# Patient Record
Sex: FEMALE | Race: WHITE | NOT HISPANIC OR LATINO | URBAN - METROPOLITAN AREA
[De-identification: names, ages, dates, MRNs, and addresses within clinical notes are randomized per-mention and may not be internally consistent; named-entity substitution may affect disease eponyms.]

---

## 2017-06-20 ENCOUNTER — OUTPATIENT (OUTPATIENT)
Dept: OUTPATIENT SERVICES | Facility: HOSPITAL | Age: 72
LOS: 1 days | Discharge: HOME | End: 2017-06-20

## 2017-06-20 DIAGNOSIS — K58.9 IRRITABLE BOWEL SYNDROME WITHOUT DIARRHEA: ICD-10-CM

## 2017-06-20 DIAGNOSIS — J44.9 CHRONIC OBSTRUCTIVE PULMONARY DISEASE, UNSPECIFIED: ICD-10-CM

## 2017-06-28 DIAGNOSIS — Z12.31 ENCOUNTER FOR SCREENING MAMMOGRAM FOR MALIGNANT NEOPLASM OF BREAST: ICD-10-CM

## 2017-06-29 ENCOUNTER — OUTPATIENT (OUTPATIENT)
Dept: OUTPATIENT SERVICES | Facility: HOSPITAL | Age: 72
LOS: 1 days | Discharge: HOME | End: 2017-06-29

## 2017-06-29 DIAGNOSIS — K58.9 IRRITABLE BOWEL SYNDROME WITHOUT DIARRHEA: ICD-10-CM

## 2017-06-29 DIAGNOSIS — J44.9 CHRONIC OBSTRUCTIVE PULMONARY DISEASE, UNSPECIFIED: ICD-10-CM

## 2017-06-29 DIAGNOSIS — R92.8 OTHER ABNORMAL AND INCONCLUSIVE FINDINGS ON DIAGNOSTIC IMAGING OF BREAST: ICD-10-CM

## 2017-08-04 ENCOUNTER — INPATIENT (INPATIENT)
Facility: HOSPITAL | Age: 72
LOS: 6 days | Discharge: HOME | End: 2017-08-11
Attending: SPECIALIST

## 2017-08-04 DIAGNOSIS — J44.9 CHRONIC OBSTRUCTIVE PULMONARY DISEASE, UNSPECIFIED: ICD-10-CM

## 2017-08-04 DIAGNOSIS — K58.9 IRRITABLE BOWEL SYNDROME WITHOUT DIARRHEA: ICD-10-CM

## 2017-08-15 DIAGNOSIS — M81.0 AGE-RELATED OSTEOPOROSIS WITHOUT CURRENT PATHOLOGICAL FRACTURE: ICD-10-CM

## 2017-08-15 DIAGNOSIS — R53.1 WEAKNESS: ICD-10-CM

## 2017-08-15 DIAGNOSIS — K58.0 IRRITABLE BOWEL SYNDROME WITH DIARRHEA: ICD-10-CM

## 2017-08-15 DIAGNOSIS — D64.9 ANEMIA, UNSPECIFIED: ICD-10-CM

## 2017-08-15 DIAGNOSIS — N13.39 OTHER HYDRONEPHROSIS: ICD-10-CM

## 2017-08-15 DIAGNOSIS — E86.0 DEHYDRATION: ICD-10-CM

## 2017-08-15 DIAGNOSIS — J18.9 PNEUMONIA, UNSPECIFIED ORGANISM: ICD-10-CM

## 2017-08-15 DIAGNOSIS — J44.1 CHRONIC OBSTRUCTIVE PULMONARY DISEASE WITH (ACUTE) EXACERBATION: ICD-10-CM

## 2017-08-15 DIAGNOSIS — H40.9 UNSPECIFIED GLAUCOMA: ICD-10-CM

## 2017-08-15 DIAGNOSIS — E87.1 HYPO-OSMOLALITY AND HYPONATREMIA: ICD-10-CM

## 2017-08-15 DIAGNOSIS — J44.0 CHRONIC OBSTRUCTIVE PULMONARY DISEASE WITH ACUTE LOWER RESPIRATORY INFECTION: ICD-10-CM

## 2017-08-15 DIAGNOSIS — J90 PLEURAL EFFUSION, NOT ELSEWHERE CLASSIFIED: ICD-10-CM

## 2017-08-15 DIAGNOSIS — A41.59 OTHER GRAM-NEGATIVE SEPSIS: ICD-10-CM

## 2017-08-15 DIAGNOSIS — Z88.8 ALLERGY STATUS TO OTHER DRUGS, MEDICAMENTS AND BIOLOGICAL SUBSTANCES STATUS: ICD-10-CM

## 2017-08-15 DIAGNOSIS — Z87.01 PERSONAL HISTORY OF PNEUMONIA (RECURRENT): ICD-10-CM

## 2017-08-28 ENCOUNTER — OUTPATIENT (OUTPATIENT)
Dept: OUTPATIENT SERVICES | Facility: HOSPITAL | Age: 72
LOS: 1 days | Discharge: HOME | End: 2017-08-28

## 2017-08-28 DIAGNOSIS — K58.9 IRRITABLE BOWEL SYNDROME WITHOUT DIARRHEA: ICD-10-CM

## 2017-08-28 DIAGNOSIS — J44.9 CHRONIC OBSTRUCTIVE PULMONARY DISEASE, UNSPECIFIED: ICD-10-CM

## 2017-08-28 DIAGNOSIS — J18.9 PNEUMONIA, UNSPECIFIED ORGANISM: ICD-10-CM

## 2017-08-28 PROBLEM — Z00.00 ENCOUNTER FOR PREVENTIVE HEALTH EXAMINATION: Status: ACTIVE | Noted: 2017-08-28

## 2017-12-29 ENCOUNTER — OUTPATIENT (OUTPATIENT)
Dept: OUTPATIENT SERVICES | Facility: HOSPITAL | Age: 72
LOS: 1 days | Discharge: HOME | End: 2017-12-29

## 2017-12-29 DIAGNOSIS — K58.9 IRRITABLE BOWEL SYNDROME WITHOUT DIARRHEA: ICD-10-CM

## 2017-12-29 DIAGNOSIS — R92.8 OTHER ABNORMAL AND INCONCLUSIVE FINDINGS ON DIAGNOSTIC IMAGING OF BREAST: ICD-10-CM

## 2017-12-29 DIAGNOSIS — J44.9 CHRONIC OBSTRUCTIVE PULMONARY DISEASE, UNSPECIFIED: ICD-10-CM

## 2018-05-21 ENCOUNTER — OUTPATIENT (OUTPATIENT)
Dept: OUTPATIENT SERVICES | Facility: HOSPITAL | Age: 73
LOS: 1 days | Discharge: HOME | End: 2018-05-21

## 2018-05-21 DIAGNOSIS — H53.2 DIPLOPIA: ICD-10-CM

## 2018-07-27 ENCOUNTER — OUTPATIENT (OUTPATIENT)
Dept: OUTPATIENT SERVICES | Facility: HOSPITAL | Age: 73
LOS: 1 days | Discharge: HOME | End: 2018-07-27

## 2018-07-27 DIAGNOSIS — R06.02 SHORTNESS OF BREATH: ICD-10-CM

## 2018-07-31 ENCOUNTER — APPOINTMENT (OUTPATIENT)
Dept: OBGYN | Facility: CLINIC | Age: 73
End: 2018-07-31

## 2018-10-18 ENCOUNTER — FORM ENCOUNTER (OUTPATIENT)
Age: 73
End: 2018-10-18

## 2018-10-19 ENCOUNTER — OUTPATIENT (OUTPATIENT)
Dept: OUTPATIENT SERVICES | Facility: HOSPITAL | Age: 73
LOS: 1 days | Discharge: HOME | End: 2018-10-19

## 2018-10-19 DIAGNOSIS — Z12.31 ENCOUNTER FOR SCREENING MAMMOGRAM FOR MALIGNANT NEOPLASM OF BREAST: ICD-10-CM

## 2018-11-01 ENCOUNTER — APPOINTMENT (OUTPATIENT)
Dept: OBGYN | Facility: CLINIC | Age: 73
End: 2018-11-01

## 2018-11-15 ENCOUNTER — APPOINTMENT (OUTPATIENT)
Dept: OBGYN | Facility: CLINIC | Age: 73
End: 2018-11-15

## 2019-07-08 ENCOUNTER — APPOINTMENT (OUTPATIENT)
Dept: OBGYN | Facility: CLINIC | Age: 74
End: 2019-07-08
Payer: MEDICARE

## 2019-07-08 ENCOUNTER — LABORATORY RESULT (OUTPATIENT)
Age: 74
End: 2019-07-08

## 2019-07-08 ENCOUNTER — OUTPATIENT (OUTPATIENT)
Dept: OUTPATIENT SERVICES | Facility: HOSPITAL | Age: 74
LOS: 1 days | Discharge: HOME | End: 2019-07-08

## 2019-07-08 VITALS — BODY MASS INDEX: 18.4 KG/M2 | HEIGHT: 62 IN | WEIGHT: 100 LBS

## 2019-07-08 DIAGNOSIS — Z01.419 ENCOUNTER FOR GYNECOLOGICAL EXAMINATION (GENERAL) (ROUTINE) W/OUT ABNORMAL FINDINGS: ICD-10-CM

## 2019-07-08 DIAGNOSIS — Z78.0 ASYMPTOMATIC MENOPAUSAL STATE: ICD-10-CM

## 2019-07-08 DIAGNOSIS — M81.0 AGE-RELATED OSTEOPOROSIS W/OUT CURRENT PATHOLOGICAL FRACTURE: ICD-10-CM

## 2019-07-08 PROCEDURE — 81003 URINALYSIS AUTO W/O SCOPE: CPT | Mod: QW

## 2019-07-08 PROCEDURE — 99397 PER PM REEVAL EST PAT 65+ YR: CPT

## 2019-07-08 PROCEDURE — 77085 DXA BONE DENSITY AXL VRT FX: CPT

## 2019-07-09 DIAGNOSIS — Z01.419 ENCOUNTER FOR GYNECOLOGICAL EXAMINATION (GENERAL) (ROUTINE) WITHOUT ABNORMAL FINDINGS: ICD-10-CM

## 2019-07-14 PROBLEM — Z01.419 WELL WOMAN EXAM WITH ROUTINE GYNECOLOGICAL EXAM: Status: ACTIVE | Noted: 2019-07-14

## 2019-07-14 PROBLEM — M81.0 OSTEOPOROSIS, POSTMENOPAUSAL: Status: ACTIVE | Noted: 2019-07-14

## 2019-07-14 PROBLEM — Z78.0 MENOPAUSE: Status: ACTIVE | Noted: 2019-07-14

## 2019-07-23 LAB
BILIRUB UR QL STRIP: NORMAL
CLARITY UR: CLEAR
GLUCOSE UR-MCNC: NORMAL
HCG UR QL: NORMAL EU/DL
HGB UR QL STRIP.AUTO: NORMAL
KETONES UR-MCNC: NORMAL
LEUKOCYTE ESTERASE UR QL STRIP: 500
NITRITE UR QL STRIP: NORMAL
PH UR STRIP: 5
PROT UR STRIP-MCNC: NORMAL
SP GR UR STRIP: 1.01

## 2019-10-17 ENCOUNTER — FORM ENCOUNTER (OUTPATIENT)
Age: 74
End: 2019-10-17

## 2019-10-18 ENCOUNTER — OUTPATIENT (OUTPATIENT)
Dept: OUTPATIENT SERVICES | Facility: HOSPITAL | Age: 74
LOS: 1 days | Discharge: HOME | End: 2019-10-18
Payer: MEDICARE

## 2019-10-18 DIAGNOSIS — Z12.31 ENCOUNTER FOR SCREENING MAMMOGRAM FOR MALIGNANT NEOPLASM OF BREAST: ICD-10-CM

## 2019-10-18 PROCEDURE — 77067 SCR MAMMO BI INCL CAD: CPT | Mod: 26

## 2019-10-18 PROCEDURE — 77063 BREAST TOMOSYNTHESIS BI: CPT | Mod: 26

## 2020-07-09 ENCOUNTER — APPOINTMENT (OUTPATIENT)
Dept: OBGYN | Facility: CLINIC | Age: 75
End: 2020-07-09

## 2020-10-22 ENCOUNTER — RESULT REVIEW (OUTPATIENT)
Age: 75
End: 2020-10-22

## 2020-10-22 ENCOUNTER — OUTPATIENT (OUTPATIENT)
Dept: OUTPATIENT SERVICES | Facility: HOSPITAL | Age: 75
LOS: 1 days | Discharge: HOME | End: 2020-10-22
Payer: MEDICARE

## 2020-10-22 DIAGNOSIS — Z12.31 ENCOUNTER FOR SCREENING MAMMOGRAM FOR MALIGNANT NEOPLASM OF BREAST: ICD-10-CM

## 2020-10-22 PROCEDURE — 77067 SCR MAMMO BI INCL CAD: CPT | Mod: 26

## 2021-01-07 ENCOUNTER — APPOINTMENT (OUTPATIENT)
Dept: OBGYN | Facility: CLINIC | Age: 76
End: 2021-01-07

## 2021-04-04 ENCOUNTER — NON-APPOINTMENT (OUTPATIENT)
Age: 76
End: 2021-04-04

## 2021-04-04 DIAGNOSIS — Z87.09 PERSONAL HISTORY OF OTHER DISEASES OF THE RESPIRATORY SYSTEM: ICD-10-CM

## 2021-04-04 DIAGNOSIS — Z87.891 PERSONAL HISTORY OF NICOTINE DEPENDENCE: ICD-10-CM

## 2021-04-04 DIAGNOSIS — Z86.19 PERSONAL HISTORY OF OTHER INFECTIOUS AND PARASITIC DISEASES: ICD-10-CM

## 2021-04-22 ENCOUNTER — APPOINTMENT (OUTPATIENT)
Dept: PULMONOLOGY | Facility: CLINIC | Age: 76
End: 2021-04-22
Payer: MEDICARE

## 2021-04-22 VITALS
HEIGHT: 62 IN | DIASTOLIC BLOOD PRESSURE: 78 MMHG | BODY MASS INDEX: 18.81 KG/M2 | RESPIRATION RATE: 14 BRPM | OXYGEN SATURATION: 94 % | SYSTOLIC BLOOD PRESSURE: 150 MMHG | WEIGHT: 102.2 LBS | HEART RATE: 62 BPM

## 2021-04-22 PROCEDURE — 99072 ADDL SUPL MATRL&STAF TM PHE: CPT

## 2021-04-22 PROCEDURE — 99213 OFFICE O/P EST LOW 20 MIN: CPT

## 2021-04-22 NOTE — PHYSICAL EXAM
[No Acute Distress] : no acute distress [Normal Oropharynx] : normal oropharynx [Normal Appearance] : normal appearance [No Neck Mass] : no neck mass [Normal Rate/Rhythm] : normal rate/rhythm [Normal S1, S2] : normal s1, s2 [No Murmurs] : no murmurs [No Resp Distress] : no resp distress [Rales] : rales [No Abnormalities] : no abnormalities [No Clubbing] : no clubbing [No Cyanosis] : no cyanosis [No Edema] : no edema

## 2021-07-28 ENCOUNTER — APPOINTMENT (OUTPATIENT)
Age: 76
End: 2021-07-28
Payer: MEDICARE

## 2021-07-28 VITALS
SYSTOLIC BLOOD PRESSURE: 118 MMHG | HEART RATE: 78 BPM | BODY MASS INDEX: 18.77 KG/M2 | HEIGHT: 62 IN | RESPIRATION RATE: 12 BRPM | DIASTOLIC BLOOD PRESSURE: 70 MMHG | WEIGHT: 102 LBS | OXYGEN SATURATION: 92 %

## 2021-07-28 PROCEDURE — 99072 ADDL SUPL MATRL&STAF TM PHE: CPT

## 2021-07-28 PROCEDURE — 99213 OFFICE O/P EST LOW 20 MIN: CPT

## 2021-08-26 ENCOUNTER — APPOINTMENT (OUTPATIENT)
Age: 76
End: 2021-08-26
Payer: MEDICARE

## 2021-08-26 VITALS
RESPIRATION RATE: 12 BRPM | OXYGEN SATURATION: 92 % | HEART RATE: 58 BPM | DIASTOLIC BLOOD PRESSURE: 80 MMHG | SYSTOLIC BLOOD PRESSURE: 120 MMHG

## 2021-08-26 PROCEDURE — 99214 OFFICE O/P EST MOD 30 MIN: CPT
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
## 2021-08-26 NOTE — PHYSICAL EXAM
[No Acute Distress] : no acute distress [Normal Oropharynx] : normal oropharynx [Normal Appearance] : normal appearance [No Neck Mass] : no neck mass [Normal Rate/Rhythm] : normal rate/rhythm [Normal S1, S2] : normal s1, s2 [No Murmurs] : no murmurs [No Resp Distress] : no resp distress [No Abnormalities] : no abnormalities [Benign] : benign [Normal Gait] : normal gait [No Clubbing] : no clubbing [No Cyanosis] : no cyanosis [No Edema] : no edema [FROM] : FROM [Normal Color/ Pigmentation] : normal color/ pigmentation [No Focal Deficits] : no focal deficits [Oriented x3] : oriented x3 [Normal Affect] : normal affect [TextBox_68] : wheeze and crackles

## 2021-08-26 NOTE — HISTORY OF PRESENT ILLNESS
[Severe Persistent] : severe persistent [Doing Poorly] : doing poorly [Not Well Controlled] : Not well controlled [Wheezing] : worsened wheezing [Shortness Of Breath] : worsened shortness of breath [Cough] : worsened coughing [Chest Tightness Or Heavy Pressure] : worsened chest tightness [de-identified] : just finished a pred taper helped but now SOB again [de-identified] : flare of the bronchiectasis [TextBox_4] : I reviewed  original evaluation  and last notes.Was followed by [facility]AS in our office.\par I reviewed and interpreted the /CT on the:\[facility] site via computer access.\par I discussed the results with the patient.\par

## 2021-10-21 ENCOUNTER — APPOINTMENT (OUTPATIENT)
Age: 76
End: 2021-10-21
Payer: MEDICARE

## 2021-10-21 VITALS
RESPIRATION RATE: 14 BRPM | DIASTOLIC BLOOD PRESSURE: 70 MMHG | HEIGHT: 62 IN | HEART RATE: 80 BPM | SYSTOLIC BLOOD PRESSURE: 128 MMHG | WEIGHT: 101 LBS | BODY MASS INDEX: 18.58 KG/M2

## 2021-10-21 PROCEDURE — 99214 OFFICE O/P EST MOD 30 MIN: CPT

## 2021-10-21 RX ORDER — AZITHROMYCIN 250 MG/1
250 TABLET, FILM COATED ORAL
Refills: 0 | Status: COMPLETED | COMMUNITY
End: 2021-10-21

## 2021-10-21 RX ORDER — AZITHROMYCIN 250 MG/1
250 TABLET, FILM COATED ORAL
Qty: 1 | Refills: 0 | Status: COMPLETED | COMMUNITY
Start: 2021-07-28 | End: 2021-10-21

## 2021-10-21 NOTE — PHYSICAL EXAM
[No Acute Distress] : no acute distress [Normal Oropharynx] : normal oropharynx [Normal Appearance] : normal appearance [No Neck Mass] : no neck mass [Normal Rate/Rhythm] : normal rate/rhythm [Normal S1, S2] : normal s1, s2 [No Murmurs] : no murmurs [No Resp Distress] : no resp distress [No Abnormalities] : no abnormalities [Benign] : benign [Normal Gait] : normal gait [No Clubbing] : no clubbing [No Cyanosis] : no cyanosis [No Edema] : no edema [FROM] : FROM [Normal Color/ Pigmentation] : normal color/ pigmentation [No Focal Deficits] : no focal deficits [Oriented x3] : oriented x3 [Normal Affect] : normal affect [TextBox_68] : wheeze and crackles decreased breath sounds

## 2021-10-21 NOTE — ASSESSMENT
[FreeTextEntry1] : I believe the patient is having an exacerbation of her bronchiectasis\par She cannot get her secretions out.\par \par A:\par COPD  \par Bronchiectasis with exacerbation\par \par plan:\par Prednisone taper\par Stress compliance with inhalers.  All meds renewed today.\par cont PRN albuterol neb\par cont ICS/LABA/LAMA\par Z-Alexei\par trial of a AIr Physio device\par \par F/U spring or sooner\par

## 2021-10-21 NOTE — REASON FOR VISIT
[Acute] : an acute visit [Bronchiectasis] : bronchiectasis [Shortness of Breath] : shortness of breath [TextBox_44] : Patient previously followed by [facility].  Has a history of bronchial ectasis and COPD.  She is very short of breath today.  She states that she can cough the mucus up.  This has been worsening over several days.  On a good day she feels somewhat better.  He does not feel that this is a dramatic difference but enough to make her feel poorly.  She has not been having any fevers.  She cannot get any secretions out.

## 2021-11-04 ENCOUNTER — OUTPATIENT (OUTPATIENT)
Dept: OUTPATIENT SERVICES | Facility: HOSPITAL | Age: 76
LOS: 1 days | Discharge: HOME | End: 2021-11-04
Payer: MEDICARE

## 2021-11-04 ENCOUNTER — RESULT REVIEW (OUTPATIENT)
Age: 76
End: 2021-11-04

## 2021-11-04 DIAGNOSIS — Z12.31 ENCOUNTER FOR SCREENING MAMMOGRAM FOR MALIGNANT NEOPLASM OF BREAST: ICD-10-CM

## 2021-11-04 PROCEDURE — 77067 SCR MAMMO BI INCL CAD: CPT | Mod: 26

## 2021-11-04 PROCEDURE — 77063 BREAST TOMOSYNTHESIS BI: CPT | Mod: 26

## 2021-12-28 RX ORDER — FLUTICASONE PROPIONATE AND SALMETEROL 500; 50 UG/1; UG/1
500-50 POWDER RESPIRATORY (INHALATION) TWICE DAILY
Qty: 3 | Refills: 5 | Status: COMPLETED | COMMUNITY
End: 2021-12-28

## 2021-12-28 RX ORDER — FLUTICASONE PROPIONATE AND SALMETEROL 250; 50 UG/1; UG/1
250-50 POWDER RESPIRATORY (INHALATION)
Qty: 1 | Refills: 5 | Status: COMPLETED | COMMUNITY
End: 2021-12-28

## 2021-12-28 RX ORDER — UMECLIDINIUM 62.5 UG/1
62.5 AEROSOL, POWDER ORAL DAILY
Qty: 3 | Refills: 3 | Status: COMPLETED | COMMUNITY
End: 2021-12-28

## 2021-12-28 RX ORDER — FLUTICASONE PROPIONATE AND SALMETEROL 500; 50 UG/1; UG/1
500-50 POWDER RESPIRATORY (INHALATION) TWICE DAILY
Qty: 3 | Refills: 3 | Status: COMPLETED | COMMUNITY
End: 2021-12-28

## 2022-03-24 ENCOUNTER — APPOINTMENT (OUTPATIENT)
Age: 77
End: 2022-03-24
Payer: MEDICARE

## 2022-03-24 VITALS — OXYGEN SATURATION: 86 %

## 2022-03-24 PROCEDURE — 99214 OFFICE O/P EST MOD 30 MIN: CPT | Mod: 25

## 2022-03-24 PROCEDURE — 71046 X-RAY EXAM CHEST 2 VIEWS: CPT

## 2022-03-24 RX ORDER — PREDNISONE 10 MG/1
10 TABLET ORAL
Qty: 20 | Refills: 3 | Status: COMPLETED | COMMUNITY
Start: 2021-05-20 | End: 2022-03-24

## 2022-03-24 RX ORDER — PREDNISONE 10 MG/1
10 TABLET ORAL
Qty: 20 | Refills: 3 | Status: COMPLETED | COMMUNITY
Start: 2021-07-28 | End: 2022-03-24

## 2022-03-24 NOTE — REASON FOR VISIT
[Follow-Up] : a follow-up visit [COPD] : COPD [Bronchiectasis] : bronchiectasis [Shortness of Breath] : shortness of breath [TextBox_44] : Patient states that she has been having more shortness of breath past several days.\par No fevers there is no sputum production she states that she walks a short amount patient cannot catch her breath.  This is been slowly worsening.\par \par Patient has been taking her medications she does have a nebulizer which she states is not always helpful.

## 2022-03-24 NOTE — ASSESSMENT
[FreeTextEntry1] : Assessment:\par COPD   with exacerbation \par Bronchiectasis\par \par plan:\par nebulizer given in office today.\par She is refusing to go to the emergency room.  She states that she will feel better when she relaxes.\par She needs to have a CAT scan of the chest I am concerned about possible SAJI on top of her chronic lung disease..  She does not think this is necessary at this point.\par She knows if she worsens to any significant extent she needs to go to the emergency room\par Stress compliance with inhalers. Renewed today.\par cont PRN albuterol\par cont ICS/LABA\par I will order home O2 therapy for the patient.\par \par \par F/U telemetry visit in 2 months\par

## 2022-03-24 NOTE — REVIEW OF SYSTEMS
[Cough] : cough [SOB on Exertion] : sob on exertion [Negative] : Endocrine [Chest Tightness] : no chest tightness [Sputum] : no sputum [Pleuritic Pain] : no pleuritic pain

## 2022-03-24 NOTE — PROCEDURE
[FreeTextEntry1] : PA and LAT CXR Report\par \par PA and LAT CXR was ordered to evaluate for causes of dyspnea.\par  \par The films demonstrates:\par The heart and mediastinal structures are normal\par There are increased interstitial markings. faint areas ground glass\par There are no new infiltrates present\par There are no new nodules or  masses present \par \par \par Impression:\par Consistent with hyperinflation COPD.\par Mild groundglass infiltrates chronic.  Present on old films may represent SAJI etc\par

## 2022-03-24 NOTE — PHYSICAL EXAM
[No Acute Distress] : no acute distress [Normal Oropharynx] : normal oropharynx [Normal Appearance] : normal appearance [No Neck Mass] : no neck mass [Normal Rate/Rhythm] : normal rate/rhythm [Normal S1, S2] : normal s1, s2 [No Murmurs] : no murmurs [Wheeze] : wheeze [No Abnormalities] : no abnormalities [Benign] : benign [Normal Gait] : normal gait [No Clubbing] : no clubbing [No Cyanosis] : no cyanosis [No Edema] : no edema [FROM] : FROM [Normal Color/ Pigmentation] : normal color/ pigmentation [No Focal Deficits] : no focal deficits [Oriented x3] : oriented x3 [Normal Affect] : normal affect [TextBox_68] : Patient had right mild respiratory distress on presentation but resolved after sitting for a few moments.  Decreased breath sounds

## 2022-05-25 ENCOUNTER — APPOINTMENT (OUTPATIENT)
Age: 77
End: 2022-05-25
Payer: MEDICARE

## 2022-05-25 PROCEDURE — 99442: CPT

## 2022-05-25 NOTE — REVIEW OF SYSTEMS
[Cough] : cough [Chest Tightness] : no chest tightness [Sputum] : no sputum [Pleuritic Pain] : no pleuritic pain [SOB on Exertion] : sob on exertion [Negative] : Endocrine

## 2022-05-25 NOTE — REASON FOR VISIT
[Home] : at home, [unfilled] , at the time of the visit. [Medical Office: ([facility])___] : at the medical office located in  [Family Member] : family member [Verbal consent obtained from patient] : the patient, [unfilled] [Follow-Up] : a follow-up visit [COPD] : COPD [TextBox_44] : still feeling at baseline SOB

## 2022-05-25 NOTE — ASSESSMENT
[FreeTextEntry1] : Assessment:\par COPD   with exacerbation \par Bronchiectasis\par status quo\par plan:\par \par She is refuses  to go to the emergency room if she is sick.\par She needs to have a CAT scan of the chestbut she is refusing currentlydisease..  She does not think this is necessary at this point.\par She knows if she worsens to any significant extent she needs to go to the emergency room\par Stress compliance with inhalers. Renewed today.\par cont PRN albuterol\par cont ICS/LABA\par has  home O2 therapy for the patient.\par the patient is slowly declining.\par \par total phone call  time 14min\par \par F/U tele visit in 3-4 months\par \par \par \par \par

## 2022-06-07 ENCOUNTER — EMERGENCY (EMERGENCY)
Facility: HOSPITAL | Age: 77
LOS: 0 days | Discharge: HOME | End: 2022-06-07
Attending: EMERGENCY MEDICINE | Admitting: EMERGENCY MEDICINE
Payer: MEDICARE

## 2022-06-07 VITALS
SYSTOLIC BLOOD PRESSURE: 191 MMHG | OXYGEN SATURATION: 95 % | DIASTOLIC BLOOD PRESSURE: 74 MMHG | RESPIRATION RATE: 20 BRPM | HEART RATE: 68 BPM | TEMPERATURE: 98 F

## 2022-06-07 DIAGNOSIS — Z86.69 PERSONAL HISTORY OF OTHER DISEASES OF THE NERVOUS SYSTEM AND SENSE ORGANS: ICD-10-CM

## 2022-06-07 DIAGNOSIS — M54.9 DORSALGIA, UNSPECIFIED: ICD-10-CM

## 2022-06-07 DIAGNOSIS — I10 ESSENTIAL (PRIMARY) HYPERTENSION: ICD-10-CM

## 2022-06-07 DIAGNOSIS — R10.9 UNSPECIFIED ABDOMINAL PAIN: ICD-10-CM

## 2022-06-07 DIAGNOSIS — J44.9 CHRONIC OBSTRUCTIVE PULMONARY DISEASE, UNSPECIFIED: ICD-10-CM

## 2022-06-07 DIAGNOSIS — Z87.19 PERSONAL HISTORY OF OTHER DISEASES OF THE DIGESTIVE SYSTEM: ICD-10-CM

## 2022-06-07 DIAGNOSIS — Z88.0 ALLERGY STATUS TO PENICILLIN: ICD-10-CM

## 2022-06-07 DIAGNOSIS — Z87.2 PERSONAL HISTORY OF DISEASES OF THE SKIN AND SUBCUTANEOUS TISSUE: ICD-10-CM

## 2022-06-07 LAB
ALBUMIN SERPL ELPH-MCNC: 3.7 G/DL — SIGNIFICANT CHANGE UP (ref 3.5–5.2)
ALP SERPL-CCNC: 73 U/L — SIGNIFICANT CHANGE UP (ref 30–115)
ALT FLD-CCNC: 16 U/L — SIGNIFICANT CHANGE UP (ref 0–41)
ANION GAP SERPL CALC-SCNC: 13 MMOL/L — SIGNIFICANT CHANGE UP (ref 7–14)
APPEARANCE UR: CLEAR — SIGNIFICANT CHANGE UP
AST SERPL-CCNC: 27 U/L — SIGNIFICANT CHANGE UP (ref 0–41)
BACTERIA # UR AUTO: NEGATIVE — SIGNIFICANT CHANGE UP
BASOPHILS # BLD AUTO: 0.04 K/UL — SIGNIFICANT CHANGE UP (ref 0–0.2)
BASOPHILS NFR BLD AUTO: 0.6 % — SIGNIFICANT CHANGE UP (ref 0–1)
BILIRUB SERPL-MCNC: 0.4 MG/DL — SIGNIFICANT CHANGE UP (ref 0.2–1.2)
BILIRUB UR-MCNC: NEGATIVE — SIGNIFICANT CHANGE UP
BUN SERPL-MCNC: 24 MG/DL — HIGH (ref 10–20)
CALCIUM SERPL-MCNC: 9.3 MG/DL — SIGNIFICANT CHANGE UP (ref 8.5–10.1)
CHLORIDE SERPL-SCNC: 97 MMOL/L — LOW (ref 98–110)
CO2 SERPL-SCNC: 24 MMOL/L — SIGNIFICANT CHANGE UP (ref 17–32)
COLOR SPEC: COLORLESS — SIGNIFICANT CHANGE UP
CREAT SERPL-MCNC: 1.1 MG/DL — SIGNIFICANT CHANGE UP (ref 0.7–1.5)
DIFF PNL FLD: NEGATIVE — SIGNIFICANT CHANGE UP
EGFR: 52 ML/MIN/1.73M2 — LOW
EOSINOPHIL # BLD AUTO: 0.12 K/UL — SIGNIFICANT CHANGE UP (ref 0–0.7)
EOSINOPHIL NFR BLD AUTO: 1.8 % — SIGNIFICANT CHANGE UP (ref 0–8)
EPI CELLS # UR: 1 /HPF — SIGNIFICANT CHANGE UP (ref 0–5)
GLUCOSE SERPL-MCNC: 94 MG/DL — SIGNIFICANT CHANGE UP (ref 70–99)
GLUCOSE UR QL: NEGATIVE — SIGNIFICANT CHANGE UP
HCT VFR BLD CALC: 37.6 % — SIGNIFICANT CHANGE UP (ref 37–47)
HGB BLD-MCNC: 12.6 G/DL — SIGNIFICANT CHANGE UP (ref 12–16)
HYALINE CASTS # UR AUTO: 0 /LPF — SIGNIFICANT CHANGE UP (ref 0–7)
IMM GRANULOCYTES NFR BLD AUTO: 0.3 % — SIGNIFICANT CHANGE UP (ref 0.1–0.3)
KETONES UR-MCNC: SIGNIFICANT CHANGE UP
LACTATE SERPL-SCNC: 1.3 MMOL/L — SIGNIFICANT CHANGE UP (ref 0.7–2)
LEUKOCYTE ESTERASE UR-ACNC: ABNORMAL
LIDOCAIN IGE QN: 81 U/L — HIGH (ref 7–60)
LYMPHOCYTES # BLD AUTO: 1.76 K/UL — SIGNIFICANT CHANGE UP (ref 1.2–3.4)
LYMPHOCYTES # BLD AUTO: 25.9 % — SIGNIFICANT CHANGE UP (ref 20.5–51.1)
MCHC RBC-ENTMCNC: 31.3 PG — HIGH (ref 27–31)
MCHC RBC-ENTMCNC: 33.5 G/DL — SIGNIFICANT CHANGE UP (ref 32–37)
MCV RBC AUTO: 93.5 FL — SIGNIFICANT CHANGE UP (ref 81–99)
MONOCYTES # BLD AUTO: 0.53 K/UL — SIGNIFICANT CHANGE UP (ref 0.1–0.6)
MONOCYTES NFR BLD AUTO: 7.8 % — SIGNIFICANT CHANGE UP (ref 1.7–9.3)
NEUTROPHILS # BLD AUTO: 4.33 K/UL — SIGNIFICANT CHANGE UP (ref 1.4–6.5)
NEUTROPHILS NFR BLD AUTO: 63.6 % — SIGNIFICANT CHANGE UP (ref 42.2–75.2)
NITRITE UR-MCNC: NEGATIVE — SIGNIFICANT CHANGE UP
NRBC # BLD: 0 /100 WBCS — SIGNIFICANT CHANGE UP (ref 0–0)
PH UR: 6.5 — SIGNIFICANT CHANGE UP (ref 5–8)
PLATELET # BLD AUTO: 255 K/UL — SIGNIFICANT CHANGE UP (ref 130–400)
POTASSIUM SERPL-MCNC: 5.1 MMOL/L — HIGH (ref 3.5–5)
POTASSIUM SERPL-SCNC: 5.1 MMOL/L — HIGH (ref 3.5–5)
PROT SERPL-MCNC: 7.7 G/DL — SIGNIFICANT CHANGE UP (ref 6–8)
PROT UR-MCNC: NEGATIVE — SIGNIFICANT CHANGE UP
RBC # BLD: 4.02 M/UL — LOW (ref 4.2–5.4)
RBC # FLD: 13.2 % — SIGNIFICANT CHANGE UP (ref 11.5–14.5)
RBC CASTS # UR COMP ASSIST: 1 /HPF — SIGNIFICANT CHANGE UP (ref 0–4)
SODIUM SERPL-SCNC: 134 MMOL/L — LOW (ref 135–146)
SP GR SPEC: 1.01 — SIGNIFICANT CHANGE UP (ref 1.01–1.03)
UROBILINOGEN FLD QL: SIGNIFICANT CHANGE UP
WBC # BLD: 6.8 K/UL — SIGNIFICANT CHANGE UP (ref 4.8–10.8)
WBC # FLD AUTO: 6.8 K/UL — SIGNIFICANT CHANGE UP (ref 4.8–10.8)
WBC UR QL: 18 /HPF — HIGH (ref 0–5)

## 2022-06-07 PROCEDURE — 99285 EMERGENCY DEPT VISIT HI MDM: CPT

## 2022-06-07 PROCEDURE — 74176 CT ABD & PELVIS W/O CONTRAST: CPT | Mod: 26,MA

## 2022-06-07 PROCEDURE — 71045 X-RAY EXAM CHEST 1 VIEW: CPT | Mod: 26

## 2022-06-07 RX ORDER — MORPHINE SULFATE 50 MG/1
4 CAPSULE, EXTENDED RELEASE ORAL ONCE
Refills: 0 | Status: DISCONTINUED | OUTPATIENT
Start: 2022-06-07 | End: 2022-06-07

## 2022-06-07 RX ORDER — OXYCODONE AND ACETAMINOPHEN 5; 325 MG/1; MG/1
1 TABLET ORAL
Qty: 12 | Refills: 0
Start: 2022-06-07 | End: 2022-06-09

## 2022-06-07 RX ORDER — SODIUM CHLORIDE 9 MG/ML
1000 INJECTION INTRAMUSCULAR; INTRAVENOUS; SUBCUTANEOUS ONCE
Refills: 0 | Status: COMPLETED | OUTPATIENT
Start: 2022-06-07 | End: 2022-06-07

## 2022-06-07 RX ADMIN — SODIUM CHLORIDE 1000 MILLILITER(S): 9 INJECTION INTRAMUSCULAR; INTRAVENOUS; SUBCUTANEOUS at 09:45

## 2022-06-07 RX ADMIN — SODIUM CHLORIDE 1000 MILLILITER(S): 9 INJECTION INTRAMUSCULAR; INTRAVENOUS; SUBCUTANEOUS at 09:30

## 2022-06-07 RX ADMIN — MORPHINE SULFATE 4 MILLIGRAM(S): 50 CAPSULE, EXTENDED RELEASE ORAL at 09:51

## 2022-06-07 NOTE — ED PROVIDER NOTE - OBJECTIVE STATEMENT
The patient is a 76 year old female with a history of HTN, glaucoma, COPD, IBS presenting for right flank pain x 2 days. Pain is severe and radiating to RLQ. States she went to UC and had a negative UA. No nauea, vomiting, urinary symptoms, fevers/chills.

## 2022-06-07 NOTE — ED PROVIDER NOTE - CLINICAL SUMMARY MEDICAL DECISION MAKING FREE TEXT BOX
76-year-old female past medical history as documented with 2 days of right flank pain.  All diagnostic testing reviewed.  Patient instructed to follow-up with PMD and given return instructions.

## 2022-06-07 NOTE — ED PROVIDER NOTE - PATIENT PORTAL LINK FT
You can access the FollowMyHealth Patient Portal offered by [facility] by registering at the following website: [email or web address]. By joining [facility]’s FollowMyHealth portal, you will also be able to view your health information using other applications (apps) compatible with our system.

## 2022-06-07 NOTE — ED PROVIDER NOTE - CARE PROVIDER_API CALL
Be Christopher)  Family Medicine  [address] [place] [id number]  Phone: [phone]  Fax: [phone]  Established Patient  Follow Up Time: Urgent

## 2022-06-07 NOTE — ED ADULT TRIAGE NOTE - CHIEF COMPLAINT QUOTE
c/o right flank pain x 1 day, patient sent from urgent care to r/o kidney stone, patient had untreated UTI x 3 months ago, Hx COPD, emphysema, and asthma, home O2 PRN

## 2022-06-07 NOTE — ED PROVIDER NOTE - NS ED ROS FT
Eyes:  No visual changes, eye pain or discharge.  ENMT:  No hearing changes, pain, discharge or infections. No neck pain or stiffness.  Cardiac:  No chest pain, SOB or edema. No chest pain with exertion.  Respiratory:  No cough or respiratory distress. No hemoptysis.   GI:  No nausea, vomiting, diarrhea  :  No dysuria, frequency or burning.  MS:  No myalgia, muscle weakness, joint pain +right flank pain.   Neuro:  No headache or weakness.  No LOC.  Skin:  No skin rash.   Endocrine: No history of thyroid disease or diabetes.

## 2022-06-07 NOTE — ED PROVIDER NOTE - ATTENDING CONTRIBUTION TO CARE
I personally evaluated the patient. I reviewed the Resident’s or Physician Assistant’s note (as assigned above), and agree with the findings and plan except as documented in my note.  76-year-old female past medical history significant for hypertension, glaucoma, COPD, bronchiectasis, IBS, complaining of 2 days of right flank pain.  Flank pain is sharp and severe and radiates to the right lower quadrant.  Patient was seen at an urgent care center this morning and UA was negative.  Urgent care center papers reviewed.  No nausea, vomiting.  No urinary complaints.  No fever, chills.  No skin rash.  Normal BMs.  No chest pain, shortness of breath, cough.  Past surgical history–pilonidal cyst.  Vitals noted.  CONSTITUTIONAL: Well-appearing; well-nourished; Mild distress due to pain.  HEAD: Normocephalic; atraumatic.   EYES: PERRL; EOM intact. Conjunctiva normal B/L.   ENT: Normal pharynx with no tonsillar hypertrophy. MMM.  NECK: Supple; non-tender; no cervical lymphadenopathy.   CHEST: Normal chest excursion with respiration.   CARDIOVASCULAR: Normal S1, S2; no murmurs, rubs, or gallops.   RESPIRATORY: Normal chest excursion with respiration; breath sounds clear and equal bilaterally; no wheezes, rhonchi, or rales.  GI/: Normal bowel sounds; non-distended; non-tender. No pulsatil masses. No hernias.   BACK: No evidence of trauma or deformity. Non-tender to palpation. No CVA tenderness.   EXT: Normal ROM in all four extremities; non-tender to palpation; distal pulses are normal. No leg edema B/L.   SKIN: Normal for age and race; warm; dry; good turgor.  NEURO: A & O x 4; CN 2-12 intact. Grossly unremarkable.

## 2022-06-07 NOTE — ED PROVIDER NOTE - PHYSICAL EXAMINATION
CONSTITUTIONAL: Well-developed; well-nourished; in no acute distress.   SKIN: warm, dry  HEAD: Normocephalic; atraumatic.  EYES: no conjunctival injection.   ENT: No nasal discharge; airway clear.  NECK: Supple; non tender.  CARD: S1, S2 normal; no murmurs, gallops, or rubs. Regular rate and rhythm.   RESP: No wheezes, rales or rhonchi.  ABD: soft ntnd.  BACK: no CVA ttp.   EXT: Normal ROM.  No clubbing, cyanosis or edema.   LYMPH: No acute cervical adenopathy.  NEURO: Alert, oriented, grossly unremarkable.  PSYCH: Cooperative, appropriate.

## 2022-06-08 LAB
CULTURE RESULTS: SIGNIFICANT CHANGE UP
SPECIMEN SOURCE: SIGNIFICANT CHANGE UP

## 2022-06-08 NOTE — ED POST DISCHARGE NOTE - DETAILS
SPOKE WITH SON GEORGETTE. PATIENT HAS HX OF COPD, AND VISITS WITH DR. VILLA. GEORGETTE WILL HAVE PATIENT MAKE APPT. WITH DR. VILLA FOR CHEST CT F/U.

## 2022-06-22 ENCOUNTER — APPOINTMENT (OUTPATIENT)
Age: 77
End: 2022-06-22

## 2022-06-22 ENCOUNTER — INPATIENT (INPATIENT)
Facility: HOSPITAL | Age: 77
LOS: 3 days | Discharge: HOME | End: 2022-06-26
Attending: HOSPITALIST | Admitting: HOSPITALIST
Payer: MEDICARE

## 2022-06-22 VITALS — RESPIRATION RATE: 18 BRPM | WEIGHT: 135.8 LBS | OXYGEN SATURATION: 97 %

## 2022-06-22 DIAGNOSIS — J44.9 CHRONIC OBSTRUCTIVE PULMONARY DISEASE, UNSPECIFIED: ICD-10-CM

## 2022-06-22 DIAGNOSIS — I63.9 CEREBRAL INFARCTION, UNSPECIFIED: ICD-10-CM

## 2022-06-22 DIAGNOSIS — R29.700 NIHSS SCORE 0: ICD-10-CM

## 2022-06-22 LAB
ALBUMIN SERPL ELPH-MCNC: 3.7 G/DL — SIGNIFICANT CHANGE UP (ref 3.5–5.2)
ALP SERPL-CCNC: 68 U/L — SIGNIFICANT CHANGE UP (ref 30–115)
ALT FLD-CCNC: 14 U/L — SIGNIFICANT CHANGE UP (ref 0–41)
ANION GAP SERPL CALC-SCNC: 9 MMOL/L — SIGNIFICANT CHANGE UP (ref 7–14)
APPEARANCE UR: CLEAR — SIGNIFICANT CHANGE UP
APTT BLD: 29.7 SEC — SIGNIFICANT CHANGE UP (ref 27–39.2)
AST SERPL-CCNC: 23 U/L — SIGNIFICANT CHANGE UP (ref 0–41)
BACTERIA # UR AUTO: ABNORMAL
BASE EXCESS BLDV CALC-SCNC: 3.1 MMOL/L — HIGH (ref -2–3)
BASOPHILS # BLD AUTO: 0.03 K/UL — SIGNIFICANT CHANGE UP (ref 0–0.2)
BASOPHILS NFR BLD AUTO: 0.4 % — SIGNIFICANT CHANGE UP (ref 0–1)
BILIRUB SERPL-MCNC: 0.3 MG/DL — SIGNIFICANT CHANGE UP (ref 0.2–1.2)
BILIRUB UR-MCNC: NEGATIVE — SIGNIFICANT CHANGE UP
BLD GP AB SCN SERPL QL: SIGNIFICANT CHANGE UP
BUN SERPL-MCNC: 24 MG/DL — HIGH (ref 10–20)
CA-I SERPL-SCNC: 1.18 MMOL/L — SIGNIFICANT CHANGE UP (ref 1.15–1.33)
CALCIUM SERPL-MCNC: 9.2 MG/DL — SIGNIFICANT CHANGE UP (ref 8.5–10.1)
CHLORIDE SERPL-SCNC: 98 MMOL/L — SIGNIFICANT CHANGE UP (ref 98–110)
CO2 SERPL-SCNC: 25 MMOL/L — SIGNIFICANT CHANGE UP (ref 17–32)
COLOR SPEC: YELLOW — SIGNIFICANT CHANGE UP
CREAT SERPL-MCNC: 1.1 MG/DL — SIGNIFICANT CHANGE UP (ref 0.7–1.5)
DIFF PNL FLD: NEGATIVE — SIGNIFICANT CHANGE UP
EGFR: 52 ML/MIN/1.73M2 — LOW
EOSINOPHIL # BLD AUTO: 0.14 K/UL — SIGNIFICANT CHANGE UP (ref 0–0.7)
EOSINOPHIL NFR BLD AUTO: 1.8 % — SIGNIFICANT CHANGE UP (ref 0–8)
EPI CELLS # UR: ABNORMAL /HPF
GAS PNL BLDV: 128 MMOL/L — LOW (ref 136–145)
GAS PNL BLDV: SIGNIFICANT CHANGE UP
GAS PNL BLDV: SIGNIFICANT CHANGE UP
GLUCOSE BLDC GLUCOMTR-MCNC: 94 MG/DL — SIGNIFICANT CHANGE UP (ref 70–99)
GLUCOSE SERPL-MCNC: 96 MG/DL — SIGNIFICANT CHANGE UP (ref 70–99)
GLUCOSE UR QL: NEGATIVE MG/DL — SIGNIFICANT CHANGE UP
HCO3 BLDV-SCNC: 29 MMOL/L — SIGNIFICANT CHANGE UP (ref 22–29)
HCT VFR BLD CALC: 35.2 % — LOW (ref 37–47)
HCT VFR BLDA CALC: 37 % — LOW (ref 39–51)
HGB BLD CALC-MCNC: 12.2 G/DL — LOW (ref 12.6–17.4)
HGB BLD-MCNC: 11.1 G/DL — LOW (ref 12–16)
IMM GRANULOCYTES NFR BLD AUTO: 0.1 % — SIGNIFICANT CHANGE UP (ref 0.1–0.3)
INR BLD: 1.04 RATIO — SIGNIFICANT CHANGE UP (ref 0.65–1.3)
KETONES UR-MCNC: NEGATIVE — SIGNIFICANT CHANGE UP
LACTATE BLDV-MCNC: 0.7 MMOL/L — SIGNIFICANT CHANGE UP (ref 0.5–2)
LEUKOCYTE ESTERASE UR-ACNC: ABNORMAL
LYMPHOCYTES # BLD AUTO: 1.86 K/UL — SIGNIFICANT CHANGE UP (ref 1.2–3.4)
LYMPHOCYTES # BLD AUTO: 24.1 % — SIGNIFICANT CHANGE UP (ref 20.5–51.1)
MCHC RBC-ENTMCNC: 29.8 PG — SIGNIFICANT CHANGE UP (ref 27–31)
MCHC RBC-ENTMCNC: 31.5 G/DL — LOW (ref 32–37)
MCV RBC AUTO: 94.4 FL — SIGNIFICANT CHANGE UP (ref 81–99)
MONOCYTES # BLD AUTO: 0.69 K/UL — HIGH (ref 0.1–0.6)
MONOCYTES NFR BLD AUTO: 8.9 % — SIGNIFICANT CHANGE UP (ref 1.7–9.3)
NEUTROPHILS # BLD AUTO: 4.98 K/UL — SIGNIFICANT CHANGE UP (ref 1.4–6.5)
NEUTROPHILS NFR BLD AUTO: 64.7 % — SIGNIFICANT CHANGE UP (ref 42.2–75.2)
NITRITE UR-MCNC: NEGATIVE — SIGNIFICANT CHANGE UP
NRBC # BLD: 0 /100 WBCS — SIGNIFICANT CHANGE UP (ref 0–0)
PCO2 BLDV: 49 MMHG — HIGH (ref 39–42)
PH BLDV: 7.38 — SIGNIFICANT CHANGE UP (ref 7.32–7.43)
PH UR: 7 — SIGNIFICANT CHANGE UP (ref 5–8)
PLATELET # BLD AUTO: 232 K/UL — SIGNIFICANT CHANGE UP (ref 130–400)
PO2 BLDV: 25 MMHG — SIGNIFICANT CHANGE UP
POTASSIUM BLDV-SCNC: 4.5 MMOL/L — SIGNIFICANT CHANGE UP (ref 3.5–5.1)
POTASSIUM SERPL-MCNC: 4.5 MMOL/L — SIGNIFICANT CHANGE UP (ref 3.5–5)
POTASSIUM SERPL-SCNC: 4.5 MMOL/L — SIGNIFICANT CHANGE UP (ref 3.5–5)
PROT SERPL-MCNC: 7 G/DL — SIGNIFICANT CHANGE UP (ref 6–8)
PROT UR-MCNC: ABNORMAL MG/DL
PROTHROM AB SERPL-ACNC: 11.9 SEC — SIGNIFICANT CHANGE UP (ref 9.95–12.87)
RAPID RVP RESULT: SIGNIFICANT CHANGE UP
RBC # BLD: 3.73 M/UL — LOW (ref 4.2–5.4)
RBC # FLD: 13.2 % — SIGNIFICANT CHANGE UP (ref 11.5–14.5)
RBC CASTS # UR COMP ASSIST: SIGNIFICANT CHANGE UP /HPF
SAO2 % BLDV: 40 % — SIGNIFICANT CHANGE UP
SARS-COV-2 RNA SPEC QL NAA+PROBE: SIGNIFICANT CHANGE UP
SODIUM SERPL-SCNC: 132 MMOL/L — LOW (ref 135–146)
SP GR SPEC: 1.01 — SIGNIFICANT CHANGE UP (ref 1.01–1.03)
TROPONIN T SERPL-MCNC: <0.01 NG/ML — SIGNIFICANT CHANGE UP
UROBILINOGEN FLD QL: 0.2 MG/DL — SIGNIFICANT CHANGE UP
WBC # BLD: 7.71 K/UL — SIGNIFICANT CHANGE UP (ref 4.8–10.8)
WBC # FLD AUTO: 7.71 K/UL — SIGNIFICANT CHANGE UP (ref 4.8–10.8)
WBC UR QL: ABNORMAL /HPF

## 2022-06-22 PROCEDURE — 93010 ELECTROCARDIOGRAM REPORT: CPT

## 2022-06-22 PROCEDURE — 0042T: CPT

## 2022-06-22 PROCEDURE — 99442: CPT

## 2022-06-22 PROCEDURE — 70498 CT ANGIOGRAPHY NECK: CPT | Mod: 26,MA

## 2022-06-22 PROCEDURE — 99285 EMERGENCY DEPT VISIT HI MDM: CPT

## 2022-06-22 PROCEDURE — 70496 CT ANGIOGRAPHY HEAD: CPT | Mod: 26,MA

## 2022-06-22 PROCEDURE — 71045 X-RAY EXAM CHEST 1 VIEW: CPT | Mod: 26

## 2022-06-22 RX ORDER — SODIUM CHLORIDE 9 MG/ML
50 INJECTION INTRAMUSCULAR; INTRAVENOUS; SUBCUTANEOUS
Refills: 0 | Status: DISCONTINUED | OUTPATIENT
Start: 2022-06-22 | End: 2022-06-22

## 2022-06-22 RX ORDER — CLEVIDIPINE BUTYRATE 50MG/100ML
2 VIAL (ML) INTRAVENOUS
Qty: 25 | Refills: 0 | Status: DISCONTINUED | OUTPATIENT
Start: 2022-06-22 | End: 2022-06-23

## 2022-06-22 RX ORDER — IPRATROPIUM/ALBUTEROL SULFATE 18-103MCG
3 AEROSOL WITH ADAPTER (GRAM) INHALATION ONCE
Refills: 0 | Status: COMPLETED | OUTPATIENT
Start: 2022-06-22 | End: 2022-06-22

## 2022-06-22 RX ORDER — LABETALOL HCL 100 MG
5 TABLET ORAL ONCE
Refills: 0 | Status: COMPLETED | OUTPATIENT
Start: 2022-06-22 | End: 2022-06-22

## 2022-06-22 RX ORDER — IPRATROPIUM/ALBUTEROL SULFATE 18-103MCG
3 AEROSOL WITH ADAPTER (GRAM) INHALATION ONCE
Refills: 0 | Status: DISCONTINUED | OUTPATIENT
Start: 2022-06-22 | End: 2022-06-26

## 2022-06-22 RX ORDER — ALTEPLASE 100 MG
41.8 KIT INTRAVENOUS ONCE
Refills: 0 | Status: COMPLETED | OUTPATIENT
Start: 2022-06-22 | End: 2022-06-22

## 2022-06-22 RX ORDER — ALTEPLASE 100 MG
49.9 KIT INTRAVENOUS ONCE
Refills: 0 | Status: DISCONTINUED | OUTPATIENT
Start: 2022-06-22 | End: 2022-06-22

## 2022-06-22 RX ORDER — ALTEPLASE 100 MG
5.5 KIT INTRAVENOUS ONCE
Refills: 0 | Status: DISCONTINUED | OUTPATIENT
Start: 2022-06-22 | End: 2022-06-22

## 2022-06-22 RX ORDER — ALTEPLASE 100 MG
4.6 KIT INTRAVENOUS ONCE
Refills: 0 | Status: COMPLETED | OUTPATIENT
Start: 2022-06-22 | End: 2022-06-22

## 2022-06-22 RX ADMIN — ALTEPLASE 41.8 MILLIGRAM(S): KIT at 13:55

## 2022-06-22 RX ADMIN — Medication 5 MILLIGRAM(S): at 18:30

## 2022-06-22 RX ADMIN — Medication 4 MG/HR: at 20:17

## 2022-06-22 RX ADMIN — ALTEPLASE 276 MILLIGRAM(S): KIT at 13:55

## 2022-06-22 RX ADMIN — SODIUM CHLORIDE 50 MILLILITER(S): 9 INJECTION INTRAMUSCULAR; INTRAVENOUS; SUBCUTANEOUS at 15:40

## 2022-06-22 RX ADMIN — Medication 5 MILLIGRAM(S): at 14:35

## 2022-06-22 RX ADMIN — Medication 3 MILLILITER(S): at 13:30

## 2022-06-22 NOTE — PATIENT PROFILE ADULT - FALL HARM RISK - HARM RISK INTERVENTIONS

## 2022-06-22 NOTE — ED PROVIDER NOTE - NS ED ROS FT
Constitutional: no fevers/chills, no sick contacts  Eyes: No visual changes, eye pain or discharge. No photophobia  ENMT: No hearing changes, pain, discharge or infections. No sore throat or drooling.  Neck:  No neck pain or stiffness. No limited ROM  Cardiac: No SOB or edema. No chest pain with exertion.  Respiratory: No cough, +respiratory distress. No hemoptysis. No history of asthma or RAD  GI: No nausea, vomiting, diarrhea or abdominal pain  : No dysuria, frequency or burning. No discharge  MS: No myalgia, muscle weakness, joint pain or back pain  Neuro: No headache,+ R sided weakness and facial droop. No LOC  Skin: No skin rash  Endo: no diabetes or thyroid dysfunction  Heme: no abnormal bleeding or clotting  Except as documented in the HPI, all other systems are negative

## 2022-06-22 NOTE — ED ADULT NURSE REASSESSMENT NOTE - NS ED NURSE REASSESS COMMENT FT1
JUANJOSE farfan signed on to pt at 1355 when TPA admin was about to begin. pt was in CT scan between the times 1430 and 1535, MD has been made aware in changes in NIH scale and has been bedside to assess pt.

## 2022-06-22 NOTE — H&P ADULT - HISTORY OF PRESENT ILLNESS
HPI: 76yF HTN COPD on 3L home O2 p/w stroke sx - family called EMS for change in her breathing ~1220.  EMS found her tachypneic on her usual 3L home O2 but not hypoxic (98%); they upped her O2 to 4L but also noted R facial droop and weakened R , so they prenotified a stroke code en route to [facility] ED.  As per family, pt w/o prior hx stroke.  Pt is speaking in few words but unable to speak in full sentences, also different from baseline.     ED COURSE: NIH 14, given TPA in consultation with stroke neurologist.  HPI: 76yF HTN COPD on 3L home O2 p/w stroke sx - family called EMS for change in her breathing ~1220.  EMS found her tachypneic on her usual 3L home O2 but not hypoxic (98%); they upped her O2 to 4L but also noted R facial droop and weakened R , so they prenotified a stroke code en route to [facility] ED.  As per family, pt w/o prior hx stroke.  Pt is speaking in few words but unable to speak in full sentences, also different from baseline.     ED COURSE: NIH 14, given TPA in consultation with st, CT not showing any significant acute path (possible artifactual perfusion mismatch) after TPA pt remains AMS, is not cooperative with exam and BP remains persistently elevated.     Vital Signs Last 24 Hrs  T(F): 97.4 (22 Jun 2022 17:55), Max: 98.2 (22 Jun 2022 15:36)  HR: 69 (22 Jun 2022 18:14) (57 - 97)  BP: 182/79 (22 Jun 2022 18:14) (148/108 - 204/87)  RR: 22 (22 Jun 2022 18:14) (18 - 23)  SpO2: 99% (22 Jun 2022 18:14) (95% - 100%)

## 2022-06-22 NOTE — ED PROVIDER NOTE - CARE PLAN
Principal Discharge DX:	Stroke  Secondary Diagnosis:	HTN (hypertension)  Secondary Diagnosis:	COPD, severe   1

## 2022-06-22 NOTE — H&P ADULT - NSHPPHYSICALEXAM_GEN_ALL_CORE
GEN: NAD  HEENT: NCAT, PERRL, No JVD/TD  CV/CHEST: RRR, +S1/S2, no murmurs  PULM: CTAB, Good Air Entry Bilaterally  ABD: SNTTP, ND x 4 Q's  EXT: Warm, Well Perfused x 4. No Edema  SKIN: No Rash  NEURO: Awake to loud voice answering her name but not able to answer questions otherwise, very somnolent

## 2022-06-22 NOTE — ED PROVIDER NOTE - PHYSICAL EXAMINATION
CONSTITUTIONAL: well developed; thin elderly woman, tired appearing  HEAD: normocephalic; atraumatic  EYES: no conjunctival injection, no scleral icterus  ENT: no nasal discharge; airway clear.  NECK: supple; non tender. + full passive ROM in all directions  CARD: warm and well perfused, not tachycardic  RESP: b/l breath sounds, tachypneic, on supplemental O2  ABD: soft; non-distended; non-tender. No rebound, no guarding, no pulsatile abdominal mass  EXT: moving all extremities spontaneously, normal ROM. No clubbing, cyanosis or edema  SKIN: warm and dry, no lesions noted  NEURO: awake/easily arousable, see stroke note for full NIHSS  PSYCH: calm, cooperative, appropriate, good eye contact, logical thought process, no apparent danger to self or others

## 2022-06-22 NOTE — ED PROVIDER NOTE - OBJECTIVE STATEMENT
76yF HTN COPD on 3L home O2 p/w stroke sx - family called EMS for change in her breathing ~1220.  EMS found her tachypneic on her usual 3L home O2 but not hypoxic (98%); they upped her O2 to 4L but also noted R facial droop and weakened R , so they prenotified a stroke code en route to [facility] ED.  As per family, pt w/o prior hx stroke.  Pt is speaking in few words but unable to speak in full sentences, also different from baseline.

## 2022-06-22 NOTE — ED PROVIDER NOTE - IV ALTEPLASE ADMINISTRATION
REVIEW OF SYSTEMS:    CONSTITUTIONAL: No weakness, fevers or chills  EYES/ENT: No visual changes;  No dysphagia  NECK: No pain or stiffness  RESPIRATORY: No cough, wheezing, hemoptysis; No shortness of breath  CARDIOVASCULAR: No chest pain or palpitations; No lower extremity edema  GASTROINTESTINAL: No abdominal or epigastric pain. No nausea, vomiting, or hematemesis; No diarrhea or constipation. No melena or hematochezia.  BACK: No back pain  GENITOURINARY: No dysuria, frequency or hematuria  NEUROLOGICAL: No numbness or weakness  SKIN: No itching, burning, rashes, or lesions   All other review of systems is negative unless indicated above.
Yes

## 2022-06-22 NOTE — PATIENT PROFILE ADULT - PATIENT'S PREFERRED PRONOUN
General Question     Subject: Patient advised that her hand is still really swollen and it hurts. Requesting a call to discuss.   Patient Alexi Nelson Number: [phone]
Spoke with Elly Patiño. 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 I told her that she can do ibuprofen and elevation and ice. Patient just wanted to make sure that she is not going to hurt what Dr. Annabelle Alva has done with that finger and I told her no. I told the patient to call back if anything else pops ups.
Spoke with the patient she is going to email me pictures of her hand.
Her/She

## 2022-06-22 NOTE — ED ADULT NURSE NOTE - NSIMPLEMENTINTERV_GEN_ALL_ED
Implemented All Fall with Harm Risk Interventions:  [place] to call system. Call bell, personal items and telephone within reach. Instruct patient to call for assistance. Room bathroom lighting operational. Non-slip footwear when patient is off stretcher. Physically safe environment: no spills, clutter or unnecessary equipment. Stretcher in lowest position, wheels locked, appropriate side rails in place. Provide visual cue, wrist band, yellow gown, etc. Monitor gait and stability. Monitor for mental status changes and reorient to person, place, and time. Review medications for side effects contributing to fall risk. Reinforce activity limits and safety measures with patient and family. Provide visual clues: red socks.

## 2022-06-22 NOTE — REASON FOR VISIT
[Home] : at home, [unfilled] , at the time of the visit. [Medical Office: ([facility])___] : at the medical office located in  [Verbal consent obtained from patient] : the patient, [unfilled] [Follow-Up] : a follow-up visit [COPD] : COPD [Emphysema] : emphysema [Shortness of Breath] : shortness of breath

## 2022-06-22 NOTE — ED PROVIDER NOTE - PROGRESS NOTE DETAILS
EK - d/w telestroke Dr. Loera - despite improving sx, pt continues to have objective neuro deficit (R gaze preference, mild facial droop), so give tPA and send back to CT for CTA/CTP. EK - tPA given 1355 once respiratory status stabilized (with duoneb) and BP improved (repeat improved w/o meds once resp status improved).  Repeat NIHSS at that time 2.

## 2022-06-22 NOTE — ASSESSMENT
[FreeTextEntry1] : Pt cannot speak more than 2-3 words without a gasping breath\par She was told to go to the ED for evaluation\par I then spoke to her  Evens he is going to bring her to the EDfor evaluation\par she may have a pneumonia of exacerbation of her bronchiectasis\par \par total phone call  time 12min\par \par

## 2022-06-22 NOTE — H&P ADULT - ATTENDING COMMENTS
76yF HTN COPD on 3L home O2 p/w stroke sx - family called EMS for change in her breathing ~1220.  EMS found her tachypneic on her usual 3L home O2 but not hypoxic (98%); they upped her O2 to 4L but also noted R facial droop and weakened R , so they pre-notified a stroke code en route to [facility] ED. In ED pt received TPA      suspected acute CVA     - s/p TPA   - check repeat ct head after 24hrs and if negative start aspirin   - mri brain

## 2022-06-22 NOTE — REVIEW OF SYSTEMS
[Cough] : cough [Chest Tightness] : no chest tightness [Sputum] : no sputum [Pleuritic Pain] : no pleuritic pain [SOB on Exertion] : sob on exertion [Negative] : Endocrine [TextBox_30] : worsening SOB

## 2022-06-22 NOTE — ED ADULT TRIAGE NOTE - CHIEF COMPLAINT QUOTE
Note called. BIBA, as per EMS "family called for heavy breathing, upon EMS arrival, a right facial droop and R sided weakness was seen". pt not baseline, pt not completing sentences at this time. Stroke code called. Last known well 12:00p. Normally on home O2 3LNC, hx of COPD.

## 2022-06-22 NOTE — H&P ADULT - ASSESSMENT
IMPRESSION:  # AMS CVA vs. PRESS? vs.   #  #    PLAN:    NEUROLOGY:      CARDIOVASCULAR:      PULMONARY:      GASTROINTESTINAL:      RENAL:      INFECTIOUS DISEASE:      ENDOCRINE:      HEME:      FLUIDS/ELECTROLYTES/NUTRITION:  -IVF  -Monitor, Replete to K>4 and Mg>2  -Diet    PROPHYLAXIS  -DVT: HSQ  -GI- PPI qd    DISPO: MICU  FULL CODE IMPRESSION:  # AMS CVA vs. PRESS? vs.   # s/p TPA  # COPD on PRN home O2 3LPM, not acute  # Chronic Hypercapneic RF  # h/o recurrent PNA + Bronchiectasis  # h/o IBS    PLAN:    NEUROLOGY: Neuro Checks Q1hr,       CARDIOVASCULAR:   - BP control Keep systolic 140-160  - telemetry  - 2D echo  - Trops x 2   - Cleviprex gtt  - A-line       PULMONARY:   - Duonebs PRN  - c/w advair  - c/w Montelukast        GASTROINTESTINAL:  - NPO  - GI PPX    RENAL:  - Monitor lytes  - Keep Mg > 2.0    INFECTIOUS DISEASE:  - check Procal + MRSA nares  - Monitor off abx    ENDOCRINE:  - SS/BB PRN    HEME:  - NO CHEMICAL DVT PPX  - SCDs      PROPHYLAXIS  -DVT: SCD  -GI- PPI qd    DISPO: MICU  FULL CODE IMPRESSION:  # AMS CVA vs. PRESS? vs.   # s/p TPA  # COPD on PRN home O2 3LPM, not acute  # Chronic Hypercapneic RF  # h/o recurrent PNA + Bronchiectasis  # h/o IBS    PLAN:    NEUROLOGY: Neuro Checks Q1hr,       CARDIOVASCULAR:   - BP control Keep systolic 140-160  - telemetry  - 2D echo  - Trops x 2   - Cleviprex gtt        PULMONARY:   - Duonebs PRN  - c/w advair  - c/w Montelukast        GASTROINTESTINAL:  - NPO  - GI PPX    RENAL:  - Monitor lytes  - Keep Mg > 2.0    INFECTIOUS DISEASE:  - check Procal + MRSA nares  - Monitor off abx    ENDOCRINE:  - SS/BB PRN    HEME:  - NO CHEMICAL DVT PPX  - SCDs      PROPHYLAXIS  -DVT: SCD  -GI- PPI qd    DISPO: MICU  FULL CODE

## 2022-06-22 NOTE — ED PROVIDER NOTE - CLINICAL SUMMARY MEDICAL DECISION MAKING FREE TEXT BOX
76yF HTN COPD on 3L home O2 p/w resp distress, found to have stroke-like sx - R facial droop, intermittent aphasia, RUE weakness, L gaze deviation - NIHSS 14 though variable and improving.  CTH w/o ICH and pt within window, so given tPA (in addition to duonebs and steroids for resp distress, likely COPD exacerbation).  CTA/CTP w/ motion artifact.  BP controlled with IV labetalol and pt admitted to ICU for further care.

## 2022-06-23 LAB
ALBUMIN SERPL ELPH-MCNC: 3.6 G/DL — SIGNIFICANT CHANGE UP (ref 3.5–5.2)
ALP SERPL-CCNC: 76 U/L — SIGNIFICANT CHANGE UP (ref 30–115)
ALT FLD-CCNC: 14 U/L — SIGNIFICANT CHANGE UP (ref 0–41)
ANION GAP SERPL CALC-SCNC: 13 MMOL/L — SIGNIFICANT CHANGE UP (ref 7–14)
APTT BLD: 28 SEC — SIGNIFICANT CHANGE UP (ref 27–39.2)
AST SERPL-CCNC: 19 U/L — SIGNIFICANT CHANGE UP (ref 0–41)
BASOPHILS # BLD AUTO: 0.04 K/UL — SIGNIFICANT CHANGE UP (ref 0–0.2)
BASOPHILS NFR BLD AUTO: 0.4 % — SIGNIFICANT CHANGE UP (ref 0–1)
BILIRUB SERPL-MCNC: 0.5 MG/DL — SIGNIFICANT CHANGE UP (ref 0.2–1.2)
BUN SERPL-MCNC: 23 MG/DL — HIGH (ref 10–20)
CALCIUM SERPL-MCNC: 9 MG/DL — SIGNIFICANT CHANGE UP (ref 8.5–10.1)
CHLORIDE SERPL-SCNC: 94 MMOL/L — LOW (ref 98–110)
CO2 SERPL-SCNC: 24 MMOL/L — SIGNIFICANT CHANGE UP (ref 17–32)
CREAT SERPL-MCNC: 1 MG/DL — SIGNIFICANT CHANGE UP (ref 0.7–1.5)
EGFR: 58 ML/MIN/1.73M2 — LOW
EOSINOPHIL # BLD AUTO: 0.01 K/UL — SIGNIFICANT CHANGE UP (ref 0–0.7)
EOSINOPHIL NFR BLD AUTO: 0.1 % — SIGNIFICANT CHANGE UP (ref 0–8)
GLUCOSE SERPL-MCNC: 90 MG/DL — SIGNIFICANT CHANGE UP (ref 70–99)
HCT VFR BLD CALC: 36.8 % — LOW (ref 37–47)
HGB BLD-MCNC: 11.9 G/DL — LOW (ref 12–16)
IMM GRANULOCYTES NFR BLD AUTO: 0.4 % — HIGH (ref 0.1–0.3)
INR BLD: 1.06 RATIO — SIGNIFICANT CHANGE UP (ref 0.65–1.3)
LYMPHOCYTES # BLD AUTO: 2.31 K/UL — SIGNIFICANT CHANGE UP (ref 1.2–3.4)
LYMPHOCYTES # BLD AUTO: 23.1 % — SIGNIFICANT CHANGE UP (ref 20.5–51.1)
MAGNESIUM SERPL-MCNC: 1.9 MG/DL — SIGNIFICANT CHANGE UP (ref 1.8–2.4)
MCHC RBC-ENTMCNC: 30 PG — SIGNIFICANT CHANGE UP (ref 27–31)
MCHC RBC-ENTMCNC: 32.3 G/DL — SIGNIFICANT CHANGE UP (ref 32–37)
MCV RBC AUTO: 92.7 FL — SIGNIFICANT CHANGE UP (ref 81–99)
MONOCYTES # BLD AUTO: 0.99 K/UL — HIGH (ref 0.1–0.6)
MONOCYTES NFR BLD AUTO: 9.9 % — HIGH (ref 1.7–9.3)
NEUTROPHILS # BLD AUTO: 6.63 K/UL — HIGH (ref 1.4–6.5)
NEUTROPHILS NFR BLD AUTO: 66.1 % — SIGNIFICANT CHANGE UP (ref 42.2–75.2)
NRBC # BLD: 0 /100 WBCS — SIGNIFICANT CHANGE UP (ref 0–0)
PHOSPHATE SERPL-MCNC: 4.3 MG/DL — SIGNIFICANT CHANGE UP (ref 2.1–4.9)
PLATELET # BLD AUTO: 252 K/UL — SIGNIFICANT CHANGE UP (ref 130–400)
POTASSIUM SERPL-MCNC: 3.8 MMOL/L — SIGNIFICANT CHANGE UP (ref 3.5–5)
POTASSIUM SERPL-SCNC: 3.8 MMOL/L — SIGNIFICANT CHANGE UP (ref 3.5–5)
PROT SERPL-MCNC: 7 G/DL — SIGNIFICANT CHANGE UP (ref 6–8)
PROTHROM AB SERPL-ACNC: 12.2 SEC — SIGNIFICANT CHANGE UP (ref 9.95–12.87)
RBC # BLD: 3.97 M/UL — LOW (ref 4.2–5.4)
RBC # FLD: 13 % — SIGNIFICANT CHANGE UP (ref 11.5–14.5)
SODIUM SERPL-SCNC: 131 MMOL/L — LOW (ref 135–146)
TROPONIN T SERPL-MCNC: <0.01 NG/ML — SIGNIFICANT CHANGE UP
WBC # BLD: 10.02 K/UL — SIGNIFICANT CHANGE UP (ref 4.8–10.8)
WBC # FLD AUTO: 10.02 K/UL — SIGNIFICANT CHANGE UP (ref 4.8–10.8)

## 2022-06-23 PROCEDURE — 99222 1ST HOSP IP/OBS MODERATE 55: CPT

## 2022-06-23 PROCEDURE — 93010 ELECTROCARDIOGRAM REPORT: CPT

## 2022-06-23 PROCEDURE — 70450 CT HEAD/BRAIN W/O DYE: CPT | Mod: 26

## 2022-06-23 PROCEDURE — 93306 TTE W/DOPPLER COMPLETE: CPT | Mod: 26

## 2022-06-23 PROCEDURE — 99223 1ST HOSP IP/OBS HIGH 75: CPT

## 2022-06-23 RX ORDER — LISINOPRIL 2.5 MG/1
10 TABLET ORAL DAILY
Refills: 0 | Status: DISCONTINUED | OUTPATIENT
Start: 2022-06-23 | End: 2022-06-26

## 2022-06-23 RX ORDER — TIMOLOL 0.5 %
1 DROPS OPHTHALMIC (EYE)
Refills: 0 | Status: DISCONTINUED | OUTPATIENT
Start: 2022-06-23 | End: 2022-06-26

## 2022-06-23 RX ORDER — ASCORBIC ACID 60 MG
500 TABLET,CHEWABLE ORAL DAILY
Refills: 0 | Status: DISCONTINUED | OUTPATIENT
Start: 2022-06-23 | End: 2022-06-26

## 2022-06-23 RX ORDER — BUDESONIDE AND FORMOTEROL FUMARATE DIHYDRATE 160; 4.5 UG/1; UG/1
2 AEROSOL RESPIRATORY (INHALATION)
Refills: 0 | Status: DISCONTINUED | OUTPATIENT
Start: 2022-06-23 | End: 2022-06-26

## 2022-06-23 RX ORDER — OXYCODONE AND ACETAMINOPHEN 5; 325 MG/1; MG/1
1 TABLET ORAL EVERY 6 HOURS
Refills: 0 | Status: DISCONTINUED | OUTPATIENT
Start: 2022-06-23 | End: 2022-06-26

## 2022-06-23 RX ORDER — ATORVASTATIN CALCIUM 80 MG/1
80 TABLET, FILM COATED ORAL AT BEDTIME
Refills: 0 | Status: DISCONTINUED | OUTPATIENT
Start: 2022-06-23 | End: 2022-06-26

## 2022-06-23 RX ORDER — MONTELUKAST 4 MG/1
10 TABLET, CHEWABLE ORAL DAILY
Refills: 0 | Status: DISCONTINUED | OUTPATIENT
Start: 2022-06-23 | End: 2022-06-26

## 2022-06-23 RX ORDER — CHOLECALCIFEROL (VITAMIN D3) 125 MCG
2000 CAPSULE ORAL DAILY
Refills: 0 | Status: DISCONTINUED | OUTPATIENT
Start: 2022-06-23 | End: 2022-06-26

## 2022-06-23 RX ORDER — DORZOLAMIDE HYDROCHLORIDE 20 MG/ML
1 SOLUTION/ DROPS OPHTHALMIC THREE TIMES A DAY
Refills: 0 | Status: DISCONTINUED | OUTPATIENT
Start: 2022-06-23 | End: 2022-06-23

## 2022-06-23 RX ORDER — DORZOLAMIDE HYDROCHLORIDE TIMOLOL MALEATE 20; 5 MG/ML; MG/ML
1 SOLUTION/ DROPS OPHTHALMIC
Refills: 0 | Status: DISCONTINUED | OUTPATIENT
Start: 2022-06-23 | End: 2022-06-23

## 2022-06-23 RX ORDER — ACETAMINOPHEN 500 MG
975 TABLET ORAL EVERY 6 HOURS
Refills: 0 | Status: DISCONTINUED | OUTPATIENT
Start: 2022-06-23 | End: 2022-06-26

## 2022-06-23 RX ORDER — DORZOLAMIDE HYDROCHLORIDE 20 MG/ML
1 SOLUTION/ DROPS OPHTHALMIC
Refills: 0 | Status: DISCONTINUED | OUTPATIENT
Start: 2022-06-23 | End: 2022-06-26

## 2022-06-23 RX ADMIN — MONTELUKAST 10 MILLIGRAM(S): 4 TABLET, CHEWABLE ORAL at 12:14

## 2022-06-23 RX ADMIN — Medication 100 MILLIGRAM(S): at 14:47

## 2022-06-23 RX ADMIN — Medication 10 MILLIGRAM(S): at 12:14

## 2022-06-23 RX ADMIN — DORZOLAMIDE HYDROCHLORIDE 1 DROP(S): 20 SOLUTION/ DROPS OPHTHALMIC at 21:49

## 2022-06-23 RX ADMIN — BUDESONIDE AND FORMOTEROL FUMARATE DIHYDRATE 2 PUFF(S): 160; 4.5 AEROSOL RESPIRATORY (INHALATION) at 21:48

## 2022-06-23 RX ADMIN — Medication 500 MILLIGRAM(S): at 12:14

## 2022-06-23 RX ADMIN — ATORVASTATIN CALCIUM 80 MILLIGRAM(S): 80 TABLET, FILM COATED ORAL at 21:47

## 2022-06-23 RX ADMIN — Medication 975 MILLIGRAM(S): at 17:13

## 2022-06-23 RX ADMIN — Medication 2000 UNIT(S): at 12:15

## 2022-06-23 RX ADMIN — Medication 975 MILLIGRAM(S): at 18:01

## 2022-06-23 RX ADMIN — Medication 1 DROP(S): at 17:13

## 2022-06-23 RX ADMIN — Medication 100 MILLIGRAM(S): at 21:48

## 2022-06-23 NOTE — DIETITIAN INITIAL EVALUATION ADULT - PERTINENT LABORATORY DATA
06-23    131<L>  |  94<L>  |  23<H>  ----------------------------<  90  3.8   |  24  |  1.0    Ca    9.0      23 Jun 2022 05:36  Phos  4.3     06-23  Mg     1.9     06-23    TPro  7.0  /  Alb  3.6  /  TBili  0.5  /  DBili  x   /  AST  19  /  ALT  14  /  AlkPhos  76  06-23  POCT Blood Glucose.: 94 mg/dL (06-22-22 @ 18:28)                        11.9   10.02 )-----------( 252      ( 23 Jun 2022 05:36 )             36.8

## 2022-06-23 NOTE — CONSULT NOTE ADULT - SUBJECTIVE AND OBJECTIVE BOX
Neurology Consult  ELI Medina [id number]    Patient is a 76y old  Female who presents with a chief complaint of AMS (2022 11:07)      HPI:  HPI: 76yF HTN COPD on 3L home O2 p/w stroke sx - family called EMS for change in her breathing ~1220.  EMS found her tachypneic on her usual 3L home O2 but not hypoxic (98%); they upped her O2 to 4L but also noted R facial droop and weakened R , so they prenotified a stroke code en route to [facility] ED.  As per family, pt w/o prior hx stroke.  Pt is speaking in few words but unable to speak in full sentences, also different from baseline.     ED COURSE: NIH 14, given TPA in consultation with st, CT not showing any significant acute path (possible artifactual perfusion mismatch) after TPA pt remains AMS, is not cooperative with exam and BP remains persistently elevated.     Vital Signs Last 24 Hrs  T(F): 97.4 (2022 17:55), Max: 98.2 (2022 15:36)  HR: 69 (2022 18:14) (57 - 97)  BP: 182/79 (2022 18:14) (148/108 - 204/87)  RR: 22 (2022 18:14) (18 - 23)  SpO2: 99% (2022 18:14) (95% - 100%)     (2022 18:09)      Interim HPI -  Pt seen at bedside with Dr Machuca, patient laying in bed without complaints.  Pt states she feels better, no residual complaints today. No h/a, no dizziness, no visual complaints, and no weakness. Pt admits to above HPI and denies recent similar event.  In ER NIHSS 14 - s/p tPa 2022 @ 1355hrs.     PAST MEDICAL & SURGICAL HISTORY:    FAMILY HISTORY:    Allergies  penicillins (Unknown)  Intolerances        MEDICATIONS  (STANDING):  albuterol/ipratropium for Nebulization. 3 milliLiter(s) Nebulizer once  ascorbic acid 500 milliGRAM(s) Oral daily  benzonatate 100 milliGRAM(s) Oral three times a day  budesonide 160 MICROgram(s)/formoterol 4.5 MICROgram(s) Inhaler 2 Puff(s) Inhalation two times a day  cholecalciferol 2000 Unit(s) Oral daily  dorzolamide 2% Ophthalmic Solution 1 Drop(s) Both EYES <User Schedule>  lisinopril 10 milliGRAM(s) Oral daily  montelukast 10 milliGRAM(s) Oral daily  PARoxetine 10 milliGRAM(s) Oral daily  timolol 0.5% Solution 1 Drop(s) Both EYES two times a day    MEDICATIONS  (PRN):  oxycodone    5 mG/acetaminophen 325 mG 1 Tablet(s) Oral every 6 hours PRN Moderate Pain (4 - 6)      Review of systems:    Constitutional: No fever, weight loss or fatigue    Eyes: No eye pain or discharge  ENMT:  No difficulty hearing; No sinus or throat pain  Neck: No pain or stiffness  Respiratory: No cough, wheezing, chills or hemoptysis  Cardiovascular: No chest pain, palpitations, shortness of breath, dyspnea on exertion  Gastrointestinal: No abdominal pain, nausea, vomiting or hematemesis; No diarrhea or constipation.   Genitourinary: No dysuria, frequency, hematuria or incontinence  Neurological: As per HPI  Skin: No rashes or lesions   Endocrine: No heat or cold intolerance; No hair loss  Musculoskeletal: No joint pain or swelling  Psychiatric: No depression, anxiety, mood swings  Heme/Lymph: No easy bruising or bleeding gums    Vital Signs Last 24 Hrs  T(C): 35.8 (2022 11:00), Max: 37.9 (2022 19:29)  T(F): 96.5 (2022 11:00), Max: 100.2 (2022 19:29)  HR: 75 (2022 13:01) (57 - 89)  BP: 145/89 (2022 13:01) (118/74 - 204/87)  BP(mean): 110 (2022 13:01) (85 - 110)  RR: 25 (2022 13:03) (18 - 58)  SpO2: 98% (2022 13:01) (88% - 100%)    Neurologic Examination:  General:  Appearance is consistent with chronologic age.  No abnormal facies.   General: The patient is oriented to person, place, time and date.  Recent and remote memory intact.  Fund of knowledge is intact and normal.  Language with normal repetition, comprehension and naming.  Nondysarthric.    Cranial nerves: intact VA, VFF.  EOMI w/o nystagmus, skew or reported double vision.  PERRL.  No ptosis/weakness of eyelid closure.  Facial sensation is normal with normal bite.  No facial asymmetry.  Hearing grossly intact b/l.  Palate elevates midline.  Tongue midline.  Motor examination:   Normal tone, bulk and range of motion.  No tenderness, twitching, tremors or involuntary movements.  Formal Muscle Strength Testing: (MRC grade R/L) 5/5 UE; 5/5 LE.  No observable drift.  Reflexes:   2+ b/l pectoralis, biceps, triceps, brachioradialis, patella and Achilles.  Plantar response downgoing b/l.  Jaw jerk, Aris, clonus absent.  Sensory examination:   Intact to light touch and pinprick, pain, temperature and proprioception and vibration in all extremities.  Cerebellum:   FTN/HKS intact with normal FRANCIS in all limbs.  No dysmetria or dysdiadokinesia.    NIHSS 0    Labs:   CBC Full  -  ( 2022 05:36 )  WBC Count : 10.02 K/uL  RBC Count : 3.97 M/uL  Hemoglobin : 11.9 g/dL  Hematocrit : 36.8 %  Platelet Count - Automated : 252 K/uL  Mean Cell Volume : 92.7 fL  Mean Cell Hemoglobin : 30.0 pg  Mean Cell Hemoglobin Concentration : 32.3 g/dL  Auto Neutrophil # : 6.63 K/uL  Auto Lymphocyte # : 2.31 K/uL  Auto Monocyte # : 0.99 K/uL  Auto Eosinophil # : 0.01 K/uL  Auto Basophil # : 0.04 K/uL  Auto Neutrophil % : 66.1 %  Auto Lymphocyte % : 23.1 %  Auto Monocyte % : 9.9 %  Auto Eosinophil % : 0.1 %  Auto Basophil % : 0.4 %        131<L>  |  94<L>  |  23<H>  ----------------------------<  90  3.8   |  24  |  1.0    Ca    9.0      2022 05:36  Phos  4.3       Mg     1.9         TPro  7.0  /  Alb  3.6  /  TBili  0.5  /  DBili  x   /  AST  19  /  ALT  14  /  AlkPhos  76      LIVER FUNCTIONS - ( 2022 05:36 )  Alb: 3.6 g/dL / Pro: 7.0 g/dL / ALK PHOS: 76 U/L / ALT: 14 U/L / AST: 19 U/L / GGT: x           PT/INR - ( 2022 05:36 )   PT: 12.20 sec;   INR: 1.06 ratio         PTT - ( 2022 05:36 )  PTT:28.0 sec  Urinalysis Basic - ( 2022 16:11 )    Color: Yellow / Appearance: Clear / S.015 / pH: x  Gluc: x / Ketone: Negative  / Bili: Negative / Urobili: 0.2 mg/dL   Blood: x / Protein: Trace mg/dL / Nitrite: Negative   Leuk Esterase: Large / RBC: 1-2 /HPF / WBC 26-50 /HPF   Sq Epi: x / Non Sq Epi: Occasional /HPF / Bacteria: Few          Neuroimaging:  NCHCT: CT Head No Cont:   ACC: [id number] EXAM:  CT BRAIN                          PROCEDURE DATE:  2022          INTERPRETATION:  Clinical History / Reason for exam: Stroke code.   Worsening mental status status post TPA.    Technique: Noncontrast head CT.  Contiguous unenhanced CT axial images of   the head from the base to the vertex with coronal and sagittal reformats.    Comparison: CT head performed earlier the same day, 1:17 PM.    Findings:    Residual IV contrast is present within the intracranial vasculature and   dura which may limit evaluation of small foci of hemorrhage.    The ventricles and cortical sulci are stable with mild ventricular   prominence superimposed upon a moderate degree of parenchymal volume loss.    No gross intracranial hemorrhage is demonstrated. No large territory   infarct is demonstrated. There is no evidence of extra-axial fluid   collection.    The calvarium is intact. The paranasal sinuses and mastoids are clear.   The patient is status post bilateral cataract surgery.    IMPRESSION:    1.  No evidence of large territory infarct.    2.  Residual IV contrast present limiting evaluation of small   intracranial hemorrhage. No large intracranial hemorrhage demonstrated.    3.  Stable mild ventricular prominence.    ---End of Report ---  CASSIE WHITE MD; Attending Radiologist  This document has been electronically signed. 2022  4:40PM (22 @ 16:33)    CT Angiography/Perfusion:  MRI Brain NC:  MRA Head/Neck:  EEG:    Assessment:  This is a 76y Female with h/o COPD on PRN home O2 3LPM, Chronic Hypercapnic RF, recurrent PNA. and IBS presented to ED with acute RIGHT sided Weakness and Dysarthria.  Stroke Code  In ER NIHSS 14 - s/p tPa 2022 @ 1355hrs. Current NIHSS 0    Plan: d/w Dr Machuca    - CTH noted above no large infarct noted  - s/p tPa- bleeding precautions  - Obtain Repeat CTH today 2022 @ 1400  - IF REPEAT CTH has no signs of hemorrhage recommend ASA 325mg x 1 dose then ASA 81 mg daily  - MRI Brain non contrast  - Echo  - Atorvastatin 80mg daily  - Cardiology for ?Afib  - will determine anticoagulation tx post MRI  - PT/OT/Speech  - Stroke w/u Lipid Profile, hgA1C  - Neurology team will follow.       22 @ 13:47       Neurology Consult  ELI Medina 7425    Patient is a 76y old  Female who presents with a chief complaint of AMS (2022 11:07)      HPI:  HPI: 76yF HTN COPD on 3L home O2 p/w stroke sx - family called EMS for change in her breathing ~1220.  EMS found her tachypneic on her usual 3L home O2 but not hypoxic (98%); they upped her O2 to 4L but also noted R facial droop and weakened R , so they prenotified a stroke code en route to [facility] ED.  As per family, pt w/o prior hx stroke.  Pt is speaking in few words but unable to speak in full sentences, also different from baseline.     ED COURSE: NIH 14, given TPA in consultation with st, CT not showing any significant acute path (possible artifactual perfusion mismatch) after TPA pt remains AMS, is not cooperative with exam and BP remains persistently elevated.     Vital Signs Last 24 Hrs  T(F): 97.4 (2022 17:55), Max: 98.2 (2022 15:36)  HR: 69 (2022 18:14) (57 - 97)  BP: 182/79 (2022 18:14) (148/108 - 204/87)  RR: 22 (2022 18:14) (18 - 23)  SpO2: 99% (2022 18:14) (95% - 100%)     (2022 18:09)      Interim HPI -  Pt seen at bedside with Dr Machuca, patient laying in bed without complaints.  Pt states she feels better, no residual complaints today. No h/a, no dizziness, no visual complaints, and no weakness. Pt admits to above HPI and denies recent similar event.  In ER NIHSS 14 - s/p tPa 2022 @ 1355hrs.     PAST MEDICAL & SURGICAL HISTORY:    FAMILY HISTORY:    Allergies  penicillins (Unknown)  Intolerances        MEDICATIONS  (STANDING):  albuterol/ipratropium for Nebulization. 3 milliLiter(s) Nebulizer once  ascorbic acid 500 milliGRAM(s) Oral daily  benzonatate 100 milliGRAM(s) Oral three times a day  budesonide 160 MICROgram(s)/formoterol 4.5 MICROgram(s) Inhaler 2 Puff(s) Inhalation two times a day  cholecalciferol 2000 Unit(s) Oral daily  dorzolamide 2% Ophthalmic Solution 1 Drop(s) Both EYES <User Schedule>  lisinopril 10 milliGRAM(s) Oral daily  montelukast 10 milliGRAM(s) Oral daily  PARoxetine 10 milliGRAM(s) Oral daily  timolol 0.5% Solution 1 Drop(s) Both EYES two times a day    MEDICATIONS  (PRN):  oxycodone    5 mG/acetaminophen 325 mG 1 Tablet(s) Oral every 6 hours PRN Moderate Pain (4 - 6)      Review of systems:    Constitutional: No fever, weight loss or fatigue    Eyes: No eye pain or discharge  ENMT:  No difficulty hearing; No sinus or throat pain  Neck: No pain or stiffness  Respiratory: No cough, wheezing, chills or hemoptysis  Cardiovascular: No chest pain, palpitations, shortness of breath, dyspnea on exertion  Gastrointestinal: No abdominal pain, nausea, vomiting or hematemesis; No diarrhea or constipation.   Genitourinary: No dysuria, frequency, hematuria or incontinence  Neurological: As per HPI  Skin: No rashes or lesions   Endocrine: No heat or cold intolerance; No hair loss  Musculoskeletal: No joint pain or swelling  Psychiatric: No depression, anxiety, mood swings  Heme/Lymph: No easy bruising or bleeding gums    Vital Signs Last 24 Hrs  T(C): 35.8 (2022 11:00), Max: 37.9 (2022 19:29)  T(F): 96.5 (2022 11:00), Max: 100.2 (2022 19:29)  HR: 75 (2022 13:01) (57 - 89)  BP: 145/89 (2022 13:01) (118/74 - 204/87)  BP(mean): 110 (2022 13:01) (85 - 110)  RR: 25 (2022 13:03) (18 - 58)  SpO2: 98% (2022 13:01) (88% - 100%)    Neurologic Examination:  General:  Appearance is consistent with chronologic age.  No abnormal facies.   General: The patient is oriented to person, place, time and date.  Recent and remote memory intact.  Fund of knowledge is intact and normal.  Language with normal repetition, comprehension and naming.  Nondysarthric.    Cranial nerves: intact VA, VFF.  EOMI w/o nystagmus, skew or reported double vision.  PERRL.  No ptosis/weakness of eyelid closure.  Facial sensation is normal with normal bite.  No facial asymmetry.  Hearing grossly intact b/l.  Palate elevates midline.  Tongue midline.  Motor examination:   Normal tone, bulk and range of motion.  No tenderness, twitching, tremors or involuntary movements.  Formal Muscle Strength Testing: (MRC grade R/L) 5/5 UE; 5/5 LE.  No observable drift.  Reflexes:   2+ b/l pectoralis, biceps, triceps, brachioradialis, patella and Achilles.  Plantar response downgoing b/l.  Jaw jerk, Aris, clonus absent.  Sensory examination:   Intact to light touch and pinprick, pain, temperature and proprioception and vibration in all extremities.  Cerebellum:   FTN/HKS intact with normal FRANCIS in all limbs.  No dysmetria or dysdiadokinesia.    NIHSS 0    Labs:   CBC Full  -  ( 2022 05:36 )  WBC Count : 10.02 K/uL  RBC Count : 3.97 M/uL  Hemoglobin : 11.9 g/dL  Hematocrit : 36.8 %  Platelet Count - Automated : 252 K/uL  Mean Cell Volume : 92.7 fL  Mean Cell Hemoglobin : 30.0 pg  Mean Cell Hemoglobin Concentration : 32.3 g/dL  Auto Neutrophil # : 6.63 K/uL  Auto Lymphocyte # : 2.31 K/uL  Auto Monocyte # : 0.99 K/uL  Auto Eosinophil # : 0.01 K/uL  Auto Basophil # : 0.04 K/uL  Auto Neutrophil % : 66.1 %  Auto Lymphocyte % : 23.1 %  Auto Monocyte % : 9.9 %  Auto Eosinophil % : 0.1 %  Auto Basophil % : 0.4 %        131<L>  |  94<L>  |  23<H>  ----------------------------<  90  3.8   |  24  |  1.0    Ca    9.0      2022 05:36  Phos  4.3       Mg     1.9         TPro  7.0  /  Alb  3.6  /  TBili  0.5  /  DBili  x   /  AST  19  /  ALT  14  /  AlkPhos  76      LIVER FUNCTIONS - ( 2022 05:36 )  Alb: 3.6 g/dL / Pro: 7.0 g/dL / ALK PHOS: 76 U/L / ALT: 14 U/L / AST: 19 U/L / GGT: x           PT/INR - ( 2022 05:36 )   PT: 12.20 sec;   INR: 1.06 ratio         PTT - ( 2022 05:36 )  PTT:28.0 sec  Urinalysis Basic - ( 2022 16:11 )    Color: Yellow / Appearance: Clear / S.015 / pH: x  Gluc: x / Ketone: Negative  / Bili: Negative / Urobili: 0.2 mg/dL   Blood: x / Protein: Trace mg/dL / Nitrite: Negative   Leuk Esterase: Large / RBC: 1-2 /HPF / WBC 26-50 /HPF   Sq Epi: x / Non Sq Epi: Occasional /HPF / Bacteria: Few          Neuroimaging:  NCHCT: CT Head No Cont:   ACC: [id number] EXAM:  CT BRAIN                          PROCEDURE DATE:  2022          INTERPRETATION:  Clinical History / Reason for exam: Stroke code.   Worsening mental status status post TPA.    Technique: Noncontrast head CT.  Contiguous unenhanced CT axial images of   the head from the base to the vertex with coronal and sagittal reformats.    Comparison: CT head performed earlier the same day, 1:17 PM.    Findings:    Residual IV contrast is present within the intracranial vasculature and   dura which may limit evaluation of small foci of hemorrhage.    The ventricles and cortical sulci are stable with mild ventricular   prominence superimposed upon a moderate degree of parenchymal volume loss.    No gross intracranial hemorrhage is demonstrated. No large territory   infarct is demonstrated. There is no evidence of extra-axial fluid   collection.    The calvarium is intact. The paranasal sinuses and mastoids are clear.   The patient is status post bilateral cataract surgery.    IMPRESSION:    1.  No evidence of large territory infarct.    2.  Residual IV contrast present limiting evaluation of small   intracranial hemorrhage. No large intracranial hemorrhage demonstrated.    3.  Stable mild ventricular prominence.    ---End of Report ---  CASSIE WHITE MD; Attending Radiologist  This document has been electronically signed. 2022  4:40PM (22 @ 16:33)    CT Angiography/Perfusion:  MRI Brain NC:  MRA Head/Neck:  EEG:    Assessment:  This is a 76y Female with h/o COPD on PRN home O2 3LPM, Chronic Hypercapnic RF, recurrent PNA. and IBS presented to ED with acute RIGHT sided Weakness and Dysarthria.  Stroke Code  In ER NIHSS 14 - s/p tPa 2022 @ 1355hrs. Current NIHSS 0    Plan: d/w Dr Machuca    - CTH noted above no large infarct noted  - s/p tPa- bleeding precautions  - Obtain Repeat CTH today 2022 @ 1400  - IF REPEAT CTH at 24 hours has no signs of hemorrhage recommend ASA 325mg x 1 dose then ASA 81 mg daily  - MRI Brain non contrast.  If volume of acute stroke is low may change from antiplatelet therapy to begin anticoagulation at 72 hours, otherwise at 7-10 days.  - Echo  - Atorvastatin 80mg daily  - Cardiology for ?Afib  - will determine anticoagulation tx post MRI  - PT/OT/Speech  - Stroke w/u Lipid Profile, hgA1C  - Neurology team will follow.       22 @ 13:47

## 2022-06-23 NOTE — SWALLOW BEDSIDE ASSESSMENT ADULT - ESOPHAGEAL PHASE
+ toleration observed without overt symptoms of impairments. + toleration observed without overt symptoms of impairments + toleration observed without overt symptoms of impairment

## 2022-06-23 NOTE — PROGRESS NOTE ADULT - ASSESSMENT
IMPRESSION:  AMS/?CVA SP TPA  New onset A fib  Ho glaucoma  HO osteoporosis    PLAN:    CNS: repeat ct head this afternoon, neuro follow up    HEENT: Oral care    PULMONARY:  HOB @ 45 degrees.  Aspiration precautions. symbicort and spiriva    CARDIOVASCULAR: cardiology eval, echo. restart home BP meds    GI: GI prophylaxis.  Feeding per S&S.  Bowel regimen     RENAL:  Follow up lytes.  Correct as needed    INFECTIOUS DISEASE: Follow up cultures. monitor off abx    HEMATOLOGICAL:  start ac 24 hours after tpa dose    ENDOCRINE:  Follow up FS.  Insulin protocol if needed.    MUSCULOSKELETAL: oobtc, pt/ot    dispo: MICU  possible dg to sdu after ct head results

## 2022-06-23 NOTE — CONSULT NOTE ADULT - ASSESSMENT
75 y/o f pmh HTN, COPD on home O2 2L presented for AMS pt was code stroke in the ED R facial droop, weakened R , NIH 14 received TPA, neuro following. This am was found in new onset sinus arrythmia c/f Afib.    # Sinus arrythmia  #HTN    Plan  Pt with no document hx of Afib  currently asymptomatic hemodynamically stable   sinus rythm with pacs on continuous tele hr 70s   12 lead ecg reviewed with Dr Aguirre, appears to be Sinus rythm with PACs  continue to monitor on tele  12 lead ecg prn  MCOT in outpatient  will hold ac for now  c/w bp management per neuro recommendation  recall cards as needed    Discussed with Dr Aguirre   77 y/o f pmh HTN, COPD on home O2 2L presented for AMS pt was code stroke in the ED R facial droop, weakened R , NIH 14 received TPA, neuro following. This am was found in new onset sinus arrythmia c/f Afib.    # Sinus arrythmia  #HTN    Plan  Pt with no document hx of Afib  currently asymptomatic hemodynamically stable   sinus rythm with pacs on continuous tele hr 70s   12 lead ecg reviewed with Dr Aguirre, appears to be Sinus rythm with PACs  continue to monitor on tele  12 lead ecg prn  MCOT in outpatient  will hold ac for now  TTE pending  c/w bp management per neuro recommendation  recall cards as needed    Discussed with Dr Aguirre

## 2022-06-23 NOTE — PHYSICAL THERAPY INITIAL EVALUATION ADULT - ADDITIONAL COMMENTS
pt lives with her  and son in a private house with 3-4 steps to enter with 1 rail and 1 flight of stairs up to bedroom and bathroom area. pt was independent community ambulator prior to admission without AD.  Pt is using +O2 via NC @ 3l at home

## 2022-06-23 NOTE — PHYSICAL THERAPY INITIAL EVALUATION ADULT - GENERAL OBSERVATIONS, REHAB EVAL
14;05-14;29 pt was seen for PT IE at bed side, pt is agreeable, chart thoroughly reviewed, RN Tara is aware.  Pt received and left semi ray in bed, in no apparent distress, +telemonitoring, +BP cuff and +pulse ox, +O2 via NC @ 1l, +primafit, +hep lock, +call bell within reach, bed side table at reach, family at bed side.

## 2022-06-23 NOTE — DIETITIAN INITIAL EVALUATION ADULT - ADD RECOMMEND
Add additional restrictions to diet of: NO egg yolks, red meat, corn, leafy green vegetables, pork, fresh fruit

## 2022-06-23 NOTE — CONSULT NOTE ADULT - NS ATTEND AMEND GEN_ALL_CORE FT
Patient examined 6/23/22 and NIH score was 0.  She should get MRI brain w/o to assess for acute stroke and is low volume may change to Eliquis 5 mg bid.  If volume is medium to high then would wait 7-10 days before starting anticoagulation.  Recommend PT/OT/SPeech therapy evaluations.
Patient with cva. She got TPA. Reviewed ekg. NSR with pac . No afib thus far. She needs a echo . She needs outpatient MCOT 30 days

## 2022-06-23 NOTE — PROGRESS NOTE ADULT - SUBJECTIVE AND OBJECTIVE BOX
Patient is a 76y old  Female who presents with a chief complaint of AMS (2022 18:09)      HPI:  HPI: 76yF HTN COPD on 3L home O2 p/w stroke sx - family called EMS for change in her breathing ~1220.  EMS found her tachypneic on her usual 3L home O2 but not hypoxic (98%); they upped her O2 to 4L but also noted R facial droop and weakened R , so they prenotified a stroke code en route to [facility] ED.  As per family, pt w/o prior hx stroke.  Pt is speaking in few words but unable to speak in full sentences, also different from baseline.     ED COURSE: NIH 14, given TPA in consultation with st, CT not showing any significant acute path (possible artifactual perfusion mismatch) after TPA pt remains AMS, is not cooperative with exam and BP remains persistently elevated.     Vital Signs Last 24 Hrs  T(F): 97.4 (2022 17:55), Max: 98.2 (2022 15:36)  HR: 69 (2022 18:14) (57 - 97)  BP: 182/79 (2022 18:14) (148/108 - 204/87)  RR: 22 (2022 18:14) (18 - 23)  SpO2: 99% (2022 18:14) (95% - 100%)     (2022 18:09)      PAST MEDICAL & SURGICAL HISTORY:      SOCIAL HX:   Smoking                         ETOH                            Other    FAMILY HISTORY:  :  No known cardiovacular family hisotry     Review Of Systems:     All ROS are negative except per HPI       Allergies    penicillins (Unknown)    Intolerances          PHYSICAL EXAM    ICU Vital Signs Last 24 Hrs  T(C): 36 (2022 07:11), Max: 37.9 (2022 19:29)  T(F): 96.8 (2022 07:11), Max: 100.2 (2022 19:29)  HR: 65 (2022 09:00) (57 - 97)  BP: 132/59 (2022 09:00) (118/74 - 204/87)  BP(mean): 85 (2022 09:00) (85 - 110)  ABP: --  ABP(mean): --  RR: 20 (2022 09:01) (18 - 54)  SpO2: 95% (2022 09:00) (88% - 100%)      CONSTITUTIONAL:  Well nourished.   NAD    ENT:   Airway patent,   Mouth with normal mucosa.   No thrush      CARDIAC:   Normal rate,   Regular rhythm.    No edema      Vascular:   normal systolic impulse  no bruits    RESPIRATORY:   No wheezing  Bilateral BS   Not tachypneic,  No use of accessory muscles    GASTROINTESTINAL:  Abdomen soft,   Non-tender,   No guarding,   + BS      NEUROLOGICAL:   Alert and oriented   No motor deficits.    SKIN:   Skin normal color for race,   No evidence of rash.      HEME LYMPH: .  No cervical  lymphadenopathy.  No inguinal lymphadenopathy            22 @ 07:01  -  22 @ 07:00  --------------------------------------------------------  IN:    Clevidipine: 16 mL  Total IN: 16 mL    OUT:    Voided (mL): 275 mL  Total OUT: 275 mL    Total NET: -259 mL          LABS:                          11.9   10.02 )-----------( 252      ( 2022 05:36 )             36.8                                               06-23    131<L>  |  94<L>  |  23<H>  ----------------------------<  90  3.8   |  24  |  1.0    Ca    9.0      2022 05:36  Phos  4.3     06-23  Mg     1.9     06-23    TPro  7.0  /  Alb  3.6  /  TBili  0.5  /  DBili  x   /  AST  19  /  ALT  14  /  AlkPhos  76  06-23      PT/INR - ( 2022 05:36 )   PT: 12.20 sec;   INR: 1.06 ratio         PTT - ( 2022 05:36 )  PTT:28.0 sec                                       Urinalysis Basic - ( 2022 16:11 )    Color: Yellow / Appearance: Clear / S.015 / pH: x  Gluc: x / Ketone: Negative  / Bili: Negative / Urobili: 0.2 mg/dL   Blood: x / Protein: Trace mg/dL / Nitrite: Negative   Leuk Esterase: Large / RBC: 1-2 /HPF / WBC 26-50 /HPF   Sq Epi: x / Non Sq Epi: Occasional /HPF / Bacteria: Few        CARDIAC MARKERS ( 2022 05:36 )  x     / <0.01 ng/mL / x     / x     / x      CARDIAC MARKERS ( 2022 13:15 )  x     / <0.01 ng/mL / x     / x     / x                                                LIVER FUNCTIONS - ( 2022 05:36 )  Alb: 3.6 g/dL / Pro: 7.0 g/dL / ALK PHOS: 76 U/L / ALT: 14 U/L / AST: 19 U/L / GGT: x                                                                                                                                       X-Rays reviewed                                                                                     ECHO    CXR interpreted by me     MEDICATIONS  (STANDING):  albuterol/ipratropium for Nebulization. 3 milliLiter(s) Nebulizer once  clevidipine Infusion 2 mG/Hr (4 mL/Hr) IV Continuous <Continuous>    MEDICATIONS  (PRN):         Patient is a 76y old  Female who presents with a chief complaint of AMS (2022 18:09.     I spoke to the pt during a routine audio tele visit for her COPD on  and found her to be confused speaking 2-3 words at a time. She was also SOB. She and her  was advised to call 911.    HPI:  HPI: 76yF HTN COPD on 3L home O2 p/w stroke sx - family called EMS for change in her breathing ~1220.  EMS found her tachypneic on her usual 3L home O2 but not hypoxic (98%); they upped her O2 to 4L but also noted R facial droop and weakened R , so they prenotified a stroke code en route to [facility] ED.  As per family, pt w/o prior hx stroke.  Pt is speaking in few words but unable to speak in full sentences, also different from baseline.     ED COURSE: NIH 14, given TPA in consultation with st, CT not showing any significant acute path (possible artifactual perfusion mismatch) after TPA pt remains AMS, is not cooperative with exam and BP remains persistently elevated.     Vital Signs Last 24 Hrs  T(F): 97.4 (2022 17:55), Max: 98.2 (2022 15:36)  HR: 69 (2022 18:14) (57 - 97)  BP: 182/79 (2022 18:14) (148/108 - 204/87)  RR: 22 (2022 18:14) (18 - 23)  SpO2: 99% (2022 18:14) (95% - 100%)     (2022 18:09)      PAST MEDICAL & SURGICAL HISTORY:      SOCIAL HX:   Smoking                         ETOH                            Other    FAMILY HISTORY:  :  No known cardiovacular family hisotry     Review Of Systems:     All ROS are negative except per HPI       Allergies    penicillins (Unknown)    Intolerances          PHYSICAL EXAM    ICU Vital Signs Last 24 Hrs  T(C): 36 (2022 07:11), Max: 37.9 (2022 19:29)  T(F): 96.8 (2022 07:11), Max: 100.2 (2022 19:29)  HR: 65 (2022 09:00) (57 - 97)  BP: 132/59 (2022 09:00) (118/74 - 204/87)  BP(mean): 85 (2022 09:00) (85 - 110)  ABP: --  ABP(mean): --  RR: 20 (2022 09:01) (18 - 54)  SpO2: 95% (2022 09:00) (88% - 100%)      CONSTITUTIONAL:  Well nourished.   NAD    ENT:   Airway patent,   Mouth with normal mucosa.   No thrush      CARDIAC:   Normal rate,   Regular rhythm.    No edema      Vascular:   normal systolic impulse  no bruits    RESPIRATORY:   No wheezing  Bilateral BS   Not tachypneic,  No use of accessory muscles    GASTROINTESTINAL:  Abdomen soft,   Non-tender,   No guarding,   + BS      NEUROLOGICAL:   Alert and oriented   No motor deficits.    SKIN:   Skin normal color for race,   No evidence of rash.      HEME LYMPH: .  No cervical  lymphadenopathy.  No inguinal lymphadenopathy            22 @ 07:01  -  22 @ 07:00  --------------------------------------------------------  IN:    Clevidipine: 16 mL  Total IN: 16 mL    OUT:    Voided (mL): 275 mL  Total OUT: 275 mL    Total NET: -259 mL          LABS:                          11.9   10.02 )-----------( 252      ( 2022 05:36 )             36.8                                               06-23    131<L>  |  94<L>  |  23<H>  ----------------------------<  90  3.8   |  24  |  1.0    Ca    9.0      2022 05:36  Phos  4.3     06-23  Mg     1.9     06-23    TPro  7.0  /  Alb  3.6  /  TBili  0.5  /  DBili  x   /  AST  19  /  ALT  14  /  AlkPhos  76  06-23      PT/INR - ( 2022 05:36 )   PT: 12.20 sec;   INR: 1.06 ratio         PTT - ( 2022 05:36 )  PTT:28.0 sec                                       Urinalysis Basic - ( 2022 16:11 )    Color: Yellow / Appearance: Clear / S.015 / pH: x  Gluc: x / Ketone: Negative  / Bili: Negative / Urobili: 0.2 mg/dL   Blood: x / Protein: Trace mg/dL / Nitrite: Negative   Leuk Esterase: Large / RBC: 1-2 /HPF / WBC 26-50 /HPF   Sq Epi: x / Non Sq Epi: Occasional /HPF / Bacteria: Few        CARDIAC MARKERS ( 2022 05:36 )  x     / <0.01 ng/mL / x     / x     / x      CARDIAC MARKERS ( 2022 13:15 )  x     / <0.01 ng/mL / x     / x     / x                                                LIVER FUNCTIONS - ( 2022 05:36 )  Alb: 3.6 g/dL / Pro: 7.0 g/dL / ALK PHOS: 76 U/L / ALT: 14 U/L / AST: 19 U/L / GGT: x                                                                                                                                       X-Rays reviewed                                                                                     ECHO    CXR interpreted by me     MEDICATIONS  (STANDING):  albuterol/ipratropium for Nebulization. 3 milliLiter(s) Nebulizer once  clevidipine Infusion 2 mG/Hr (4 mL/Hr) IV Continuous <Continuous>    MEDICATIONS  (PRN):

## 2022-06-23 NOTE — PROGRESS NOTE ADULT - ASSESSMENT
IMPRESSION:  # AMS/?CVA SP TPA - resolved  # New onset A fib  # h/o COPD/Bronchiectasis  # h/o glaucoma  # h/o osteoporosis    PLAN:    CNS: repeat ct head this afternoon, neuro follow up    HEENT: Oral care    PULMONARY:  HOB @ 45 degrees.  Aspiration precautions. symbicort and spiriva    CARDIOVASCULAR: cardiology eval, echo. restart home BP meds    GI: GI prophylaxis.  Feeding per S&S.  Bowel regimen     RENAL:  Follow up lytes.  Correct as needed    INFECTIOUS DISEASE: Follow up cultures. monitor off abx    HEMATOLOGICAL:  start ac 24 hours after tpa dose    ENDOCRINE:  Follow up FS.  Insulin protocol if needed.    MUSCULOSKELETAL: oobtc, pt/ot    dispo: MICU  possible dg to sdu after ct head results         IMPRESSION:  # AMS/?CVA SP TPA - resolved  # Sinus Arrhythmia w/PACs  # h/o COPD/Bronchiectasis  # h/o glaucoma  # h/o osteoporosis    PLAN:    CNS: repeat ct head this afternoon, neuro follow up    HEENT: Oral care    PULMONARY:  HOB @ 45 degrees.  Aspiration precautions. symbicort and spiriva    CARDIOVASCULAR: cardiology eval appreciated, no AC, echo. restart home BP meds    GI: GI prophylaxis.  Feeding per S&S.  Bowel regimen     RENAL:  Follow up lytes.  Correct as needed    INFECTIOUS DISEASE: Follow up cultures. monitor off abx    HEMATOLOGICAL:  start ac 24 hours after tpa dose    ENDOCRINE:  Follow up FS.  Insulin protocol if needed.    MUSCULOSKELETAL: oobtc, pt/ot    dispo: MICU  possible dg to sdu after ct head results

## 2022-06-23 NOTE — SWALLOW BEDSIDE ASSESSMENT ADULT - SLP GENERAL OBSERVATIONS
Received sitting in bed, slp repositioned. Patient was strong enough to sit higher up on bed. Oriented to self, location and date.

## 2022-06-23 NOTE — SWALLOW BEDSIDE ASSESSMENT ADULT - SLP PERTINENT HISTORY OF CURRENT PROBLEM
HPI: 76yF HTN COPD on 3L home O2 p/w stroke sx - family called EMS for change in her breathing ~1220.  EMS found her tachypneic on her usual 3L home O2 but not hypoxic (98%); they upped her O2 to 4L but also noted R facial droop and weakened R , so they prenotified a stroke code en route to [facility] ED.  As per family, pt w/o prior hx stroke.  Pt is speaking in few words but unable to speak in full sentences, also different from baseline.

## 2022-06-23 NOTE — DIETITIAN INITIAL EVALUATION ADULT - OTHER INFO
pt is 76 year old female with hx of COPD, IBS, recurrent PNA, BIBA 2/2 tachypnea with new onset R facial drop s/p TPA. now with new onset sinus arrythmia.

## 2022-06-23 NOTE — DIETITIAN INITIAL EVALUATION ADULT - NSFNSADHERENCEPTAFT_GEN_A_CORE
as per spouse at bedside pt with the following food intolerances 2/2 hx of IBS:  No egg yolks, raw fruits/vegetables, corn, broccoli, leafy green vegetables,  red meat, pork

## 2022-06-23 NOTE — PROGRESS NOTE ADULT - SUBJECTIVE AND OBJECTIVE BOX
JEAN BURNS Female [id number] Code Status: FULL CODE    Dispo: Admit to [facility]  Patient is a 76y old  Female who presents with a chief complaint of AMS (2022 09:55)      INTERVAL HPI/OVERNIGHT EVENTS: significantly improved mental status relative to admission (now AxOx3) developed new onset atrial fibrillation    ICU Vital Signs Last 24 Hrs  T(C): 36 (2022 07:11), Max: 37.9 (2022 19:29)  T(F): 96.8 (2022 07:11), Max: 100.2 (2022 19:29)  HR: 62 (2022 10:01) (57 - 97)  BP: 153/65 (2022 10:01) (118/74 - 204/87)  BP(mean): 93 (2022 10:01) (85 - 110)  RR: 24 (2022 10:01) (18 - 54)  SpO2: 95% (2022 10:01) (88% - 100%)      I&O's Summary    2022 07:01  -  2022 07:00  --------------------------------------------------------  IN: 16 mL / OUT: 275 mL / NET: -259 mL         Daily Height in cm: 157.48 (2022 19:29)    Daily Weight in k.9 (2022 07:11)            LABS:                        11.9   10.02 )-----------( 252      ( 2022 05:36 )             36.8     06-23    131<L>  |  94<L>  |  23<H>  ----------------------------<  90  3.8   |  24  |  1.0    Ca    9.0      2022 05:36  Phos  4.3     06-23  Mg     1.9     06-23    TPro  7.0  /  Alb  3.6  /  TBili  0.5  /  DBili  x   /  AST  19  /  ALT  14  /  AlkPhos  76  06-23    PT/INR - ( 2022 05:36 )   PT: 12.20 sec;   INR: 1.06 ratio         PTT - ( 2022 05:36 )  PTT:28.0 sec  Urinalysis Basic - ( 2022 16:11 )    Color: Yellow / Appearance: Clear / S.015 / pH: x  Gluc: x / Ketone: Negative  / Bili: Negative / Urobili: 0.2 mg/dL   Blood: x / Protein: Trace mg/dL / Nitrite: Negative   Leuk Esterase: Large / RBC: 1-2 /HPF / WBC 26-50 /HPF   Sq Epi: x / Non Sq Epi: Occasional /HPF / Bacteria: Few      CAPILLARY BLOOD GLUCOSE  85 (2022 13:08)      POCT Blood Glucose.: 94 mg/dL (2022 18:28)                        RADIOLOGY & ADDITIONAL TESTS:  < from: CT Brain Stroke Protocol (22 @ 13:21) >  IMPRESSION:  Motion limited examination.    No CT evidence of large acute territorial infarct.    No evidence of acute intracranial pathology. No mass effect, midline   shift or intracranial hemorrhage.    These findings were discussed with Dr. ELYSE H. KLERMAN [phone] at   2022 1:28 PM by Dr. Mark with read back confirmation.    --- End of Report ---    < end of copied text >  < from: CT Perfusion w/ Maps w/ IV Cont (22 @ 15:25) >  IMPRESSION:    1.  Motion limited studies.    2.  CT perfusion: Apparent patchy areas of perfusion abnormality in   bilateral cerebral hemispheres (total Tmax >6s vol 28 cc), potentially   artifactual. Recommend attention on follow-up CT or MRI. Artifactual   abnormality also noted over bilateral orbits.    3. CTA neck: Motion limited, especially at the level of the carotid   bifurcations. No gross evidence of major vascular stenosis or occlusion.    4.  CTA head: No evidence of major vascular stenosis or occlusion. 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    --- End of Report ---    < end of copied text >  < from: CT Head No Cont (22 @ 16:33) >  IMPRESSION:    1.  No evidence of large territory infarct.    2.  Residual IV contrast present limiting evaluation of small   intracranial hemorrhage. No large intracranial hemorrhage demonstrated.    3.  Stable mild ventricular prominence.    ---End of Report ---    < end of copied text >    CARDIOLOGY DIAGNOSTICS:       MEDICATIONS  (STANDING):  albuterol/ipratropium for Nebulization. 3 milliLiter(s) Nebulizer once  ascorbic acid 500 milliGRAM(s) Oral daily  benzonatate 100 milliGRAM(s) Oral three times a day  budesonide 160 MICROgram(s)/formoterol 4.5 MICROgram(s) Inhaler 2 Puff(s) Inhalation two times a day  cholecalciferol 2000 Unit(s) Oral daily  dorzolamide 2% Ophthalmic Solution 1 Drop(s) Both EYES <User Schedule>  lisinopril 10 milliGRAM(s) Oral daily  montelukast 10 milliGRAM(s) Oral daily  PARoxetine 10 milliGRAM(s) Oral daily  timolol 0.5% Solution 1 Drop(s) Both EYES two times a day    MEDICATIONS  (PRN):  oxycodone    5 mG/acetaminophen 325 mG 1 Tablet(s) Oral every 6 hours PRN Moderate Pain (4 - 6)      PHYSICAL EXAM:  GENERAL: NAD  HEENT:  NCAT, PERRL, No JVD, Trachea Midline  NERVOUS SYSTEM:  A&Ox3, Following simple commands, Good concentration, Non-focal neurological exam  CHEST/LUNG: Good air entry bilaterally; No wheezing  HEART: Irregular rhythm  ABDOMEN: Soft, Nontender, Nondistended  EXTREMITIES:  Warm, well perfused  SKIN: No new skin breakdowns

## 2022-06-23 NOTE — DIETITIAN INITIAL EVALUATION ADULT - PERTINENT MEDS FT
MEDICATIONS  (STANDING):  albuterol/ipratropium for Nebulization. 3 milliLiter(s) Nebulizer once  ascorbic acid 500 milliGRAM(s) Oral daily  benzonatate 100 milliGRAM(s) Oral three times a day  budesonide 160 MICROgram(s)/formoterol 4.5 MICROgram(s) Inhaler 2 Puff(s) Inhalation two times a day  cholecalciferol 2000 Unit(s) Oral daily  dorzolamide 2% Ophthalmic Solution 1 Drop(s) Both EYES <User Schedule>  lisinopril 10 milliGRAM(s) Oral daily  montelukast 10 milliGRAM(s) Oral daily  PARoxetine 10 milliGRAM(s) Oral daily  timolol 0.5% Solution 1 Drop(s) Both EYES two times a day    MEDICATIONS  (PRN):  oxycodone    5 mG/acetaminophen 325 mG 1 Tablet(s) Oral every 6 hours PRN Moderate Pain (4 - 6)

## 2022-06-23 NOTE — PROGRESS NOTE ADULT - ATTENDING COMMENTS
Attending Statement: I have personally performed a face to face diagnostic evaluation on this patient. The patient is suffering from:  AMS/?CVA SP TPA  New onset A fib  I have made amendments to the documentation where necessary. I have personally seen and examined this patient.  I have fully participated in the care of this patient.  I have reviewed all pertinent clinical information, including history, physical exam, plan and note.    I spoke to family at bedside.

## 2022-06-23 NOTE — CONSULT NOTE ADULT - SUBJECTIVE AND OBJECTIVE BOX
HPI:  HPI: 76yF HTN COPD on 3L home O2 p/w stroke sx - family called EMS for change in her breathing ~1220.  EMS found her tachypneic on her usual 3L home O2 but not hypoxic (98%); they upped her O2 to 4L but also noted R facial droop and weakened R , so they prenotified a stroke code en route to [facility] ED.  As per family, pt w/o prior hx stroke.  Pt is speaking in few words but unable to speak in full sentences, also different from baseline.     ED COURSE: [facility] 14, given TPA in consultation with st, CT not showing any significant acute path (possible artifactual perfusion mismatch) after TPA pt remains AMS, is not cooperative with exam and BP remains persistently elevated.     Vital Signs Last 24 Hrs  T(F): 97.4 (22 Jun 2022 17:55), Max: 98.2 (22 Jun 2022 15:36)  HR: 69 (22 Jun 2022 18:14) (57 - 97)  BP: 182/79 (22 Jun 2022 18:14) (148/108 - 204/87)  RR: 22 (22 Jun 2022 18:14) (18 - 23)  SpO2: 99% (22 Jun 2022 18:14) (95% - 100%)     (22 Jun 2022 18:09)        HPI-Cardiology   75 y/o f pmh COPD on home O2 2L presented for AMS pt was code stroke in the ED R facial droop, weakened R , NIH 14 received TPA, neuro following. This am was found in new onset Afib.      PAST MEDICAL & SURGICAL HISTORY      FAMILY HISTORY:  FAMILY HISTORY:      SOCIAL HISTORY:  []smoker  []Alcohol  []Drug    ALLERGIES:  penicillins (Unknown)      MEDICATIONS:  MEDICATIONS  (STANDING):  albuterol/ipratropium for Nebulization. 3 milliLiter(s) Nebulizer once  ascorbic acid 500 milliGRAM(s) Oral daily  benzonatate 100 milliGRAM(s) Oral three times a day  budesonide 160 MICROgram(s)/formoterol 4.5 MICROgram(s) Inhaler 2 Puff(s) Inhalation two times a day  cholecalciferol 2000 Unit(s) Oral daily  dorzolamide 2% Ophthalmic Solution 1 Drop(s) Both EYES <User Schedule>  lisinopril 10 milliGRAM(s) Oral daily  montelukast 10 milliGRAM(s) Oral daily  PARoxetine 10 milliGRAM(s) Oral daily  timolol 0.5% Solution 1 Drop(s) Both EYES two times a day    MEDICATIONS  (PRN):  oxycodone    5 mG/acetaminophen 325 mG 1 Tablet(s) Oral every 6 hours PRN Moderate Pain (4 - 6)      HOME MEDICATIONS:  Home Medications:  Advair Diskus 250 mcg-50 mcg inhalation powder: 1 puff(s) inhaled 2 times a day (22 Jun 2022 18:27)  alendronate weekly:  (22 Jun 2022 18:27)  benzonatate 100 mg oral capsule: 1 cap(s) orally 3 times a day (22 Jun 2022 18:27)  dorzolamide-timolol 2%-0.5% preservative-free ophthalmic solution: 1 drop(s) to each affected eye 2 times a day (22 Jun 2022 18:27)  lisinopril 10 mg oral tablet: 1 tab(s) orally once a day (22 Jun 2022 18:27)  montelukast 10 mg oral tablet: 1 tab(s) orally once a day (22 Jun 2022 18:27)  PARoxetine 10 mg oral tablet: 1 tab(s) orally once a day (22 Jun 2022 18:27)  Vitamin C 500 mg oral tablet: 1 tab(s) orally once a day (22 Jun 2022 18:27)  Vitamin D3 50 mcg (2000 intl units) oral tablet: 1 tab(s) orally once a day (22 Jun 2022 18:27)      VITALS:   T(F): 96.8 (06-23 @ 07:11), Max: 100.2 (06-22 @ 19:29)  HR: 62 (06-23 @ 10:01) (57 - 97)  BP: 153/65 (06-23 @ 10:01) (118/74 - 204/87)  BP(mean): 93 (06-23 @ 10:01) (85 - 110)  RR: 24 (06-23 @ 10:01) (18 - 54)  SpO2: 95% (06-23 @ 10:01) (88% - 100%)    I&O's Summary    22 Jun 2022 07:01  -  23 Jun 2022 07:00  --------------------------------------------------------  IN: 16 mL / OUT: 275 mL / NET: -259 mL        REVIEW OF SYSTEMS:  CONSTITUTIONAL: No weakness, fevers or chills  EYES: No visual changes  ENT: No vertigo or throat pain   NECK: No pain or stiffness  RESPIRATORY: No cough, wheezing, hemoptysis; No shortness of breath  CARDIOVASCULAR: No chest pain or palpitations  GASTROINTESTINAL: No abdominal or epigastric pain. No nausea, vomiting, or hematemesis; No diarrhea or constipation. No melena or hematochezia.  GENITOURINARY: No dysuria, frequency or hematuria  NEUROLOGICAL: No numbness or weakness  SKIN: No itching, no rashes  MSK: no    PHYSICAL EXAM:  NEURO: patient is awake , alert and oriented  GEN: Not in acute distress  NECK: no thyroid enlargement, no JVD  LUNGS: Clear to auscultation bilaterally   CARDIOVASCULAR: S1/S2 present, RRR , no murmurs or rubs, no carotid bruits,  + PP bilaterally  ABD: Soft, non-tender, non-distended, +BS  EXT: No MARIA LUISA  SKIN: Intact    LABS:                        11.9   10.02 )-----------( 252      ( 23 Jun 2022 05:36 )             36.8     06-23    131<L>  |  94<L>  |  23<H>  ----------------------------<  90  3.8   |  24  |  1.0    Ca    9.0      23 Jun 2022 05:36  Phos  4.3     06-23  Mg     1.9     06-23    TPro  7.0  /  Alb  3.6  /  TBili  0.5  /  DBili  x   /  AST  19  /  ALT  14  /  AlkPhos  76  06-23    PT/INR - ( 23 Jun 2022 05:36 )   PT: 12.20 sec;   INR: 1.06 ratio         PTT - ( 23 Jun 2022 05:36 )  PTT:28.0 sec  Troponin T, Serum: <0.01 ng/mL (06-23-22 @ 05:36)  Troponin T, Serum: <0.01 ng/mL (06-22-22 @ 13:15)    CARDIAC MARKERS ( 23 Jun 2022 05:36 )  x     / <0.01 ng/mL / x     / x     / x      CARDIAC MARKERS ( 22 Jun 2022 13:15 )  x     / <0.01 ng/mL / x     / x     / x            Troponin trend:            RADIOLOGY:  -CXR:  < from: Xray Chest 1 View- PORTABLE-Urgent (06.22.22 @ 15:58) >  Impression:    Bilateral interstitial opacities.        --- End of Report ---    < end of copied text >    CTA  < from: CT Head No Cont (06.22.22 @ 16:33) >  IMPRESSION:    1.  No evidence of large territory infarct.    2.  Residual IV contrast present limiting evaluation of small   intracranial hemorrhage. No large intracranial hemorrhage demonstrated.    3.  Stable mild ventricular prominence.    ---End of Report ---    < end of copied text >      ECG:    TELEMETRY EVENTS:   HPI:  HPI: 76yF HTN COPD on 3L home O2 p/w stroke sx - family called EMS for change in her breathing ~1220.  EMS found her tachypneic on her usual 3L home O2 but not hypoxic (98%); they upped her O2 to 4L but also noted R facial droop and weakened R , so they prenotified a stroke code en route to [facility] ED.  As per family, pt w/o prior hx stroke.  Pt is speaking in few words but unable to speak in full sentences, also different from baseline.     ED COURSE: NIH 14, given TPA in consultation with st, CT not showing any significant acute path (possible artifactual perfusion mismatch) after TPA pt remains AMS, is not cooperative with exam and BP remains persistently elevated.     Vital Signs Last 24 Hrs  T(F): 97.4 (22 Jun 2022 17:55), Max: 98.2 (22 Jun 2022 15:36)  HR: 69 (22 Jun 2022 18:14) (57 - 97)  BP: 182/79 (22 Jun 2022 18:14) (148/108 - 204/87)  RR: 22 (22 Jun 2022 18:14) (18 - 23)  SpO2: 99% (22 Jun 2022 18:14) (95% - 100%)     (22 Jun 2022 18:09)        HPI-Cardiology   77 y/o f pmh HTN, COPD on home O2 2L presented for AMS pt was code stroke in the ED R facial droop, weakened R , NIH 14 received TPA, neuro following. This am was found in new onset sinus arrythmia c/f Afib. Pt evaluated with family present at bedside. Pt  currently a&ox3, responds appropriately back to her usual baseline per family no overt neuro deficits on exam. Pt denies cp, sob, palpitatio, n/v/ dizziness, no personal and family hx of cardiac disease, denies smoking, ambulatory at baseline without assistive device.       PAST MEDICAL & SURGICAL HISTORY      FAMILY HISTORY:  FAMILY HISTORY:      SOCIAL HISTORY:  []smoker: non-smoker  []Alcohol  []Drug    ALLERGIES:  penicillins (Unknown)      MEDICATIONS:  MEDICATIONS  (STANDING):  albuterol/ipratropium for Nebulization. 3 milliLiter(s) Nebulizer once  ascorbic acid 500 milliGRAM(s) Oral daily  benzonatate 100 milliGRAM(s) Oral three times a day  budesonide 160 MICROgram(s)/formoterol 4.5 MICROgram(s) Inhaler 2 Puff(s) Inhalation two times a day  cholecalciferol 2000 Unit(s) Oral daily  dorzolamide 2% Ophthalmic Solution 1 Drop(s) Both EYES <User Schedule>  lisinopril 10 milliGRAM(s) Oral daily  montelukast 10 milliGRAM(s) Oral daily  PARoxetine 10 milliGRAM(s) Oral daily  timolol 0.5% Solution 1 Drop(s) Both EYES two times a day    MEDICATIONS  (PRN):  oxycodone    5 mG/acetaminophen 325 mG 1 Tablet(s) Oral every 6 hours PRN Moderate Pain (4 - 6)      HOME MEDICATIONS:  Home Medications:  Advair Diskus 250 mcg-50 mcg inhalation powder: 1 puff(s) inhaled 2 times a day (22 Jun 2022 18:27)  alendronate weekly:  (22 Jun 2022 18:27)  benzonatate 100 mg oral capsule: 1 cap(s) orally 3 times a day (22 Jun 2022 18:27)  dorzolamide-timolol 2%-0.5% preservative-free ophthalmic solution: 1 drop(s) to each affected eye 2 times a day (22 Jun 2022 18:27)  lisinopril 10 mg oral tablet: 1 tab(s) orally once a day (22 Jun 2022 18:27)  montelukast 10 mg oral tablet: 1 tab(s) orally once a day (22 Jun 2022 18:27)  PARoxetine 10 mg oral tablet: 1 tab(s) orally once a day (22 Jun 2022 18:27)  Vitamin C 500 mg oral tablet: 1 tab(s) orally once a day (22 Jun 2022 18:27)  Vitamin D3 50 mcg (2000 intl units) oral tablet: 1 tab(s) orally once a day (22 Jun 2022 18:27)      VITALS:   T(F): 96.8 (06-23 @ 07:11), Max: 100.2 (06-22 @ 19:29)  HR: 62 (06-23 @ 10:01) (57 - 97)  BP: 153/65 (06-23 @ 10:01) (118/74 - 204/87)  BP(mean): 93 (06-23 @ 10:01) (85 - 110)  RR: 24 (06-23 @ 10:01) (18 - 54)  SpO2: 95% (06-23 @ 10:01) (88% - 100%)    I&O's Summary    22 Jun 2022 07:01  -  23 Jun 2022 07:00  --------------------------------------------------------  IN: 16 mL / OUT: 275 mL / NET: -259 mL        REVIEW OF SYSTEMS:  CONSTITUTIONAL: No weakness, fevers or chills  EYES: No visual changes  ENT: No vertigo or throat pain   NECK: No pain or stiffness  RESPIRATORY: No cough, wheezing, hemoptysis; No shortness of breath  CARDIOVASCULAR: No chest pain or palpitations  GASTROINTESTINAL: No abdominal or epigastric pain. No nausea, vomiting, or hematemesis; No diarrhea or constipation. No melena or hematochezia.  GENITOURINARY: No dysuria, frequency or hematuria  NEUROLOGICAL: No numbness or weakness  SKIN: No itching, no rashes  MSK: no    PHYSICAL EXAM:  NEURO: patient is awake , alert and oriented  GEN: Not in acute distress  NECK: no thyroid enlargement, no JVD  LUNGS: Clear to auscultation bilaterally   CARDIOVASCULAR: S1/S2 present, RRR , no murmurs or rubs, no carotid bruits,  + PP bilaterally  ABD: Soft, non-tender, non-distended, +BS  EXT: No MARIA LUISA  SKIN: Intact    LABS:                        11.9   10.02 )-----------( 252      ( 23 Jun 2022 05:36 )             36.8     06-23    131<L>  |  94<L>  |  23<H>  ----------------------------<  90  3.8   |  24  |  1.0    Ca    9.0      23 Jun 2022 05:36  Phos  4.3     06-23  Mg     1.9     06-23    TPro  7.0  /  Alb  3.6  /  TBili  0.5  /  DBili  x   /  AST  19  /  ALT  14  /  AlkPhos  76  06-23    PT/INR - ( 23 Jun 2022 05:36 )   PT: 12.20 sec;   INR: 1.06 ratio         PTT - ( 23 Jun 2022 05:36 )  PTT:28.0 sec  Troponin T, Serum: <0.01 ng/mL (06-23-22 @ 05:36)  Troponin T, Serum: <0.01 ng/mL (06-22-22 @ 13:15)    CARDIAC MARKERS ( 23 Jun 2022 05:36 )  x     / <0.01 ng/mL / x     / x     / x      CARDIAC MARKERS ( 22 Jun 2022 13:15 )  x     / <0.01 ng/mL / x     / x     / x            Troponin trend:            RADIOLOGY:  -CXR:  < from: Xray Chest 1 View- PORTABLE-Urgent (06.22.22 @ 15:58) >  Impression:    Bilateral interstitial opacities.        --- End of Report ---    < end of copied text >    CTA  < from: CT Head No Cont (06.22.22 @ 16:33) >  IMPRESSION:    1.  No evidence of large territory infarct.    2.  Residual IV contrast present limiting evaluation of small   intracranial hemorrhage. No large intracranial hemorrhage demonstrated.    3.  Stable mild ventricular prominence.    ---End of Report ---    < end of copied text >      ECG:    TELEMETRY EVENTS:

## 2022-06-24 LAB
ALBUMIN SERPL ELPH-MCNC: 3.4 G/DL — LOW (ref 3.5–5.2)
ALP SERPL-CCNC: 68 U/L — SIGNIFICANT CHANGE UP (ref 30–115)
ALT FLD-CCNC: 14 U/L — SIGNIFICANT CHANGE UP (ref 0–41)
ANION GAP SERPL CALC-SCNC: 9 MMOL/L — SIGNIFICANT CHANGE UP (ref 7–14)
APTT BLD: 26.2 SEC — LOW (ref 27–39.2)
AST SERPL-CCNC: 24 U/L — SIGNIFICANT CHANGE UP (ref 0–41)
BASOPHILS # BLD AUTO: 0.05 K/UL — SIGNIFICANT CHANGE UP (ref 0–0.2)
BASOPHILS NFR BLD AUTO: 0.7 % — SIGNIFICANT CHANGE UP (ref 0–1)
BILIRUB SERPL-MCNC: 0.6 MG/DL — SIGNIFICANT CHANGE UP (ref 0.2–1.2)
BUN SERPL-MCNC: 23 MG/DL — HIGH (ref 10–20)
CALCIUM SERPL-MCNC: 9.4 MG/DL — SIGNIFICANT CHANGE UP (ref 8.5–10.1)
CHLORIDE SERPL-SCNC: 96 MMOL/L — LOW (ref 98–110)
CHOLEST SERPL-MCNC: 125 MG/DL — SIGNIFICANT CHANGE UP
CO2 SERPL-SCNC: 28 MMOL/L — SIGNIFICANT CHANGE UP (ref 17–32)
CREAT SERPL-MCNC: 1.1 MG/DL — SIGNIFICANT CHANGE UP (ref 0.7–1.5)
EGFR: 52 ML/MIN/1.73M2 — LOW
EOSINOPHIL # BLD AUTO: 0.21 K/UL — SIGNIFICANT CHANGE UP (ref 0–0.7)
EOSINOPHIL NFR BLD AUTO: 2.9 % — SIGNIFICANT CHANGE UP (ref 0–8)
GLUCOSE SERPL-MCNC: 94 MG/DL — SIGNIFICANT CHANGE UP (ref 70–99)
HCT VFR BLD CALC: 37.4 % — SIGNIFICANT CHANGE UP (ref 37–47)
HDLC SERPL-MCNC: 59 MG/DL — SIGNIFICANT CHANGE UP
HGB BLD-MCNC: 12.2 G/DL — SIGNIFICANT CHANGE UP (ref 12–16)
IMM GRANULOCYTES NFR BLD AUTO: 0.3 % — SIGNIFICANT CHANGE UP (ref 0.1–0.3)
INR BLD: 1.05 RATIO — SIGNIFICANT CHANGE UP (ref 0.65–1.3)
LIPID PNL WITH DIRECT LDL SERPL: 57 MG/DL — SIGNIFICANT CHANGE UP
LYMPHOCYTES # BLD AUTO: 2.25 K/UL — SIGNIFICANT CHANGE UP (ref 1.2–3.4)
LYMPHOCYTES # BLD AUTO: 31.6 % — SIGNIFICANT CHANGE UP (ref 20.5–51.1)
MAGNESIUM SERPL-MCNC: 2.2 MG/DL — SIGNIFICANT CHANGE UP (ref 1.8–2.4)
MCHC RBC-ENTMCNC: 30.7 PG — SIGNIFICANT CHANGE UP (ref 27–31)
MCHC RBC-ENTMCNC: 32.6 G/DL — SIGNIFICANT CHANGE UP (ref 32–37)
MCV RBC AUTO: 94.2 FL — SIGNIFICANT CHANGE UP (ref 81–99)
MONOCYTES # BLD AUTO: 0.85 K/UL — HIGH (ref 0.1–0.6)
MONOCYTES NFR BLD AUTO: 11.9 % — HIGH (ref 1.7–9.3)
NEUTROPHILS # BLD AUTO: 3.74 K/UL — SIGNIFICANT CHANGE UP (ref 1.4–6.5)
NEUTROPHILS NFR BLD AUTO: 52.6 % — SIGNIFICANT CHANGE UP (ref 42.2–75.2)
NON HDL CHOLESTEROL: 66 MG/DL — SIGNIFICANT CHANGE UP
NRBC # BLD: 0 /100 WBCS — SIGNIFICANT CHANGE UP (ref 0–0)
PHOSPHATE SERPL-MCNC: 3.7 MG/DL — SIGNIFICANT CHANGE UP (ref 2.1–4.9)
PLATELET # BLD AUTO: 272 K/UL — SIGNIFICANT CHANGE UP (ref 130–400)
POTASSIUM SERPL-MCNC: 4.8 MMOL/L — SIGNIFICANT CHANGE UP (ref 3.5–5)
POTASSIUM SERPL-SCNC: 4.8 MMOL/L — SIGNIFICANT CHANGE UP (ref 3.5–5)
PROT SERPL-MCNC: 6.9 G/DL — SIGNIFICANT CHANGE UP (ref 6–8)
PROTHROM AB SERPL-ACNC: 12.1 SEC — SIGNIFICANT CHANGE UP (ref 9.95–12.87)
RBC # BLD: 3.97 M/UL — LOW (ref 4.2–5.4)
RBC # FLD: 13 % — SIGNIFICANT CHANGE UP (ref 11.5–14.5)
SODIUM SERPL-SCNC: 133 MMOL/L — LOW (ref 135–146)
TRIGL SERPL-MCNC: 63 MG/DL — SIGNIFICANT CHANGE UP
WBC # BLD: 7.12 K/UL — SIGNIFICANT CHANGE UP (ref 4.8–10.8)
WBC # FLD AUTO: 7.12 K/UL — SIGNIFICANT CHANGE UP (ref 4.8–10.8)

## 2022-06-24 PROCEDURE — 70551 MRI BRAIN STEM W/O DYE: CPT | Mod: 26

## 2022-06-24 PROCEDURE — 99233 SBSQ HOSP IP/OBS HIGH 50: CPT

## 2022-06-24 RX ORDER — ENOXAPARIN SODIUM 100 MG/ML
40 INJECTION SUBCUTANEOUS EVERY 24 HOURS
Refills: 0 | Status: DISCONTINUED | OUTPATIENT
Start: 2022-06-24 | End: 2022-06-26

## 2022-06-24 RX ORDER — LOPERAMIDE HCL 2 MG
2 TABLET ORAL ONCE
Refills: 0 | Status: DISCONTINUED | OUTPATIENT
Start: 2022-06-24 | End: 2022-06-26

## 2022-06-24 RX ORDER — ASPIRIN/CALCIUM CARB/MAGNESIUM 324 MG
325 TABLET ORAL ONCE
Refills: 0 | Status: COMPLETED | OUTPATIENT
Start: 2022-06-24 | End: 2022-06-24

## 2022-06-24 RX ORDER — ASPIRIN/CALCIUM CARB/MAGNESIUM 324 MG
81 TABLET ORAL DAILY
Refills: 0 | Status: DISCONTINUED | OUTPATIENT
Start: 2022-06-25 | End: 2022-06-26

## 2022-06-24 RX ADMIN — DORZOLAMIDE HYDROCHLORIDE 1 DROP(S): 20 SOLUTION/ DROPS OPHTHALMIC at 23:19

## 2022-06-24 RX ADMIN — Medication 325 MILLIGRAM(S): at 23:18

## 2022-06-24 RX ADMIN — Medication 1 DROP(S): at 05:44

## 2022-06-24 RX ADMIN — Medication 10 MILLIGRAM(S): at 11:29

## 2022-06-24 RX ADMIN — MONTELUKAST 10 MILLIGRAM(S): 4 TABLET, CHEWABLE ORAL at 11:29

## 2022-06-24 RX ADMIN — Medication 100 MILLIGRAM(S): at 23:18

## 2022-06-24 RX ADMIN — Medication 2 MILLIGRAM(S): at 13:48

## 2022-06-24 RX ADMIN — BUDESONIDE AND FORMOTEROL FUMARATE DIHYDRATE 2 PUFF(S): 160; 4.5 AEROSOL RESPIRATORY (INHALATION) at 11:27

## 2022-06-24 RX ADMIN — ATORVASTATIN CALCIUM 80 MILLIGRAM(S): 80 TABLET, FILM COATED ORAL at 23:18

## 2022-06-24 RX ADMIN — Medication 500 MILLIGRAM(S): at 11:28

## 2022-06-24 RX ADMIN — DORZOLAMIDE HYDROCHLORIDE 1 DROP(S): 20 SOLUTION/ DROPS OPHTHALMIC at 10:24

## 2022-06-24 RX ADMIN — Medication 1 DROP(S): at 23:19

## 2022-06-24 RX ADMIN — Medication 2000 UNIT(S): at 11:28

## 2022-06-24 NOTE — PROGRESS NOTE ADULT - ASSESSMENT
Assessment:  This is a 76y Female with h/o COPD on PRN home O2 3LPM, Chronic Hypercapnic RF, recurrent PNA. and IBS presented to ED with acute RIGHT sided Weakness and Dysarthria.  s/p TPA in ED - now with resolution of symptoms    acute CVA s/p TPA     - start aspirin   - continue Lipitor   - check MRI brain   - PT/OT/SLP

## 2022-06-24 NOTE — PROGRESS NOTE ADULT - ASSESSMENT
IMPRESSION:  AMS/?CVA SP TPA  Ho glaucoma  HO osteoporosis    PLAN:    CNS: MRI neuro follow up    HEENT: Oral care    PULMONARY:  HOB @ 45 degrees.  Aspiration precautions. symbicort and spiriva    CARDIOVASCULAR: cardiology eval, BP control    GI: GI prophylaxis.  Feeding per S&S.  Bowel regimen     RENAL:  Follow up lytes.  Correct as needed    INFECTIOUS DISEASE: Follow up cultures. monitor off abx    HEMATOLOGICAL:  DVT ppx    ENDOCRINE:  Follow up FS.  Insulin protocol if needed.    MUSCULOSKELETAL: oobtc, pt/ot    dispo: per neurology

## 2022-06-24 NOTE — PROGRESS NOTE ADULT - SUBJECTIVE AND OBJECTIVE BOX
Patient seen and evaluated this am, awake and alert - feeling improved, no weakness       T(F): 96.2 (06-24-22 @ 07:10), Max: 97.4 (06-23-22 @ 23:24)  HR: 66 (06-24-22 @ 12:33)  BP: 114/55 (06-24-22 @ 12:33)  RR: 20  SpO2: 97% (06-24-22 @ 05:39) (96% - 100%)    PHYSICAL EXAM:  GENERAL: NAD  HEAD:  Atraumatic, Normocephalic  EYES: EOMI, PERRLA, conjunctiva and sclera clear  NERVOUS SYSTEM:  no focal deficits   CHEST/LUNG: Clear to percussion bilaterally; No rales, rhonchi, wheezing, or rubs  HEART: Regular rate and rhythm; No murmurs, rubs, or gallops  ABDOMEN: Soft, Nontender, Nondistended; Bowel sounds present  EXTREMITIES:  2+ Peripheral Pulses, No clubbing, cyanosis, or edema    LABS  06-24    133<L>  |  96<L>  |  23<H>  ----------------------------<  94  4.8   |  28  |  1.1    Ca    9.4      24 Jun 2022 05:33  Phos  3.7     06-24  Mg     2.2     06-24    TPro  6.9  /  Alb  3.4<L>  /  TBili  0.6  /  DBili  x   /  AST  24  /  ALT  14  /  AlkPhos  68  06-24                          12.2   7.12  )-----------( 272      ( 24 Jun 2022 05:33 )             37.4     PT/INR - ( 24 Jun 2022 05:33 )   PT: 12.10 sec;   INR: 1.05 ratio         PTT - ( 24 Jun 2022 05:33 )  PTT:26.2 sec    CARDIAC ENZYMES      Troponin T, Serum: <0.01 ng/mL (06-23-22 @ 05:36)  Troponin T, Serum: <0.01 ng/mL (06-22-22 @ 13:15)    < from: 12 Lead ECG (06.23.22 @ 09:03) >  Diagnosis Line Normal sinus rhythm Premature atrial complexes    < end of copied text >  < from: TTE Echo Complete w/ Contrast w/ Doppler (06.23.22 @ 14:56) >  Summary:   1. Left ventricular ejection fraction, by visual estimation, is 55 to   60%.   2. Normal right ventricular size and function.   3. Normal left atrial size.   4. There is no evidence of pericardial effusion.   5. No evidence of mitral valve regurgitation.   6. Mild tricuspid regurgitation.   7. Mild aortic regurgitation.   8. Normal trileaflet aortic valve with normal opening.    < end of copied text >    Culture Results:   <10,000 CFU/mL Normal Urogenital Jaida (06-07-22)    RADIOLOGY  < from: CT Head No Cont (06.23.22 @ 16:37) >  IMPRESSION:    No evidence of acute intracranial hemorrhage or large territory infarct.   Stable exam.      < end of copied text >    MEDICATIONS  (STANDING):  albuterol/ipratropium for Nebulization. 3 milliLiter(s) Nebulizer once  ascorbic acid 500 milliGRAM(s) Oral daily  atorvastatin 80 milliGRAM(s) Oral at bedtime  benzonatate 100 milliGRAM(s) Oral three times a day  budesonide 160 MICROgram(s)/formoterol 4.5 MICROgram(s) Inhaler 2 Puff(s) Inhalation two times a day  cholecalciferol 2000 Unit(s) Oral daily  dorzolamide 2% Ophthalmic Solution 1 Drop(s) Both EYES <User Schedule>  lisinopril 10 milliGRAM(s) Oral daily  loperamide 2 milliGRAM(s) Oral once  montelukast 10 milliGRAM(s) Oral daily  PARoxetine 10 milliGRAM(s) Oral daily  timolol 0.5% Solution 1 Drop(s) Both EYES two times a day    MEDICATIONS  (PRN):  acetaminophen     Tablet .. 975 milliGRAM(s) Oral every 6 hours PRN Mild Pain (1 - 3), Moderate Pain (4 - 6)  oxycodone    5 mG/acetaminophen 325 mG 1 Tablet(s) Oral every 6 hours PRN Moderate Pain (4 - 6)

## 2022-06-24 NOTE — OCCUPATIONAL THERAPY INITIAL EVALUATION ADULT - GENERAL OBSERVATIONS, REHAB EVAL
8:47-9:17; chart reviewed, ok to treat by Occupational Therapist as confirmed by RN, Pt received semifowler +tele +primafit +BP cuff +BLE sequentials +4L/min O2 via nc with  at bedside in NAD. Pt denied pain at rest and in agreement with OT IE.

## 2022-06-24 NOTE — OCCUPATIONAL THERAPY INITIAL EVALUATION ADULT - PERTINENT HX OF CURRENT PROBLEM, REHAB EVAL
HPI: 76yF HTN COPD on 3L home O2 p/w stroke sx - family called EMS for change in her breathing ~1220.  EMS found her tachypneic on her usual 3L home O2 but not hypoxic (98%); they upped her O2 to 4L but also noted R facial droop and weakened R , so they prenotified a stroke code en route [facility] ED.  As per family, pt w/o prior hx stroke. MRI pending

## 2022-06-24 NOTE — OCCUPATIONAL THERAPY INITIAL EVALUATION ADULT - ADDITIONAL COMMENTS
PLOF obtained from pt. Pt stated that she has a vanity next to toilet to push off of for toilet transfer at home. PLOF obtained from pt. Pt stated that she has a vanity next to toilet to push off of for toilet transfer at home. CT head 6/23/22 No evidence of acute intracranial hemorrhage or large territory infarct.

## 2022-06-24 NOTE — OCCUPATIONAL THERAPY INITIAL EVALUATION ADULT - RANGE OF MOTION EXAMINATION
Right AROM shoulder/elbow/wrist/hand WFL; Left AROM shoulder/elbow/wrist/hand WFL/no Active ROM deficits were identified

## 2022-06-24 NOTE — OCCUPATIONAL THERAPY INITIAL EVALUATION ADULT - LIVES WITH, PROFILE
with  and son in a private home +3-4 steps to enter with right handrail +flight of steps with right handrail to bedroom/bathroom +tub/children/spouse

## 2022-06-24 NOTE — OCCUPATIONAL THERAPY INITIAL EVALUATION ADULT - LEVEL OF INDEPENDENCE: BED TO CHAIR, REHAB EVAL
Physical Therapy Daily Treatment Note     Name: Jenifer Westfall  Clinic Number: [id number]    Therapy Diagnosis:   Encounter Diagnoses   Name Primary?    Weakness     Shoulder stiffness, right Yes     Physician: Chon Hammond, *    Visit Date: 9/12/2019    Physician Orders: PT Eval and Treat   Medical Diagnosis from Referral: M25.511,G89.29 (ICD-10-CM) - Chronic right shoulder pain  Evaluation Date: 8/20/2019  Authorization Period Expiration: 9/30/19  Plan of Care Expiration: 9/20/19  Visit # / Visits authorized:5/12  Time In: 3:30 pm  Time Out:  pm  Total Billable Time: 40 minutes      Precautions: Standard    Subjective     Pt reports:  She has been unable to attend PT due to family issues and lack of transportation. She notes her R shoulder has been hurting more over past week.  Response to previous treatment: soreness  Functional change: none at this time    Pain: nt  Location: right shoulder      Objective     Jenifer received therapeutic exercises to develop strength, ROM and flexibility for 32 minutes including:  R shoulder IR 3/10 red  ER with yellow tband 2-3/10   Overhead pulley flex, scaption  Isometric R shoulder ER, flex,abd  UBE forw/back 3/3  R shoulder wall slides flexion, scaption 2/10 ea  R seated row-Matrix 3/10/20    Jenifer received the following manual therapy techniques: Joint mobilizations were applied to the: R shoulder for 8 minutes, including:  Grade II post and inf GH joint mobs, PROM IR/ER, flex, abd    Jenifer received the following supervised modalities after being cleared for contradictions: IFC Electrical Stimulation:  Jenifer received IFC Electrical Stimulation for pain control applied to the R shoulder. Pt received stimulation at 100 % scan at a frequency of  for 0 minutes. Jenifer tolderated treatment well without any adverse effects.      Jenifer received hot pack for 10 minutes to R shoulder.        Home Exercises Provided and Patient Education Provided     Education provided:   -      Written Home Exercises Provided: Patient instructed to cont prior HEP.  Exercises were reviewed and Jenifer was able to demonstrate them prior to the end of the session.  Jenifer demonstrated good  understanding of the education provided.     See EMR under Patient Instructions for exercises provided prior visit.    Assessment     Pt presented to PT with increased R shoulder discomfort, which she attributes to not being able to attend PT. Her R shoulder aching diminished as she participated in treatment. She was able to complete all therex and tolerated increased IR resistance. Manual treatment revealed continued progress with PROM. She also displayed AROM R shoulder flex to about 128-130 deg.    Jenifer is progressing well towards her goals.   Pt prognosis is Good.     Pt will continue to benefit from skilled outpatient physical therapy to address the deficits listed in the problem list box on initial evaluation, provide pt/family education and to maximize pt's level of independence in the home and community environment.     Pt's spiritual, cultural and educational needs considered and pt agreeable to plan of care and goals.     Anticipated barriers to physical therapy: none    Goals:  Short Term Goals: In 3 weeks   1.I with HEP  2.decrease R shoulder pain to 5/10  3. Increase AROM R shoulder ER to 60 deg, flex 110 deg, abd 105 deg  4. Increase PROM R shoulder flex and abd to 115 deg        Long Term Goals: In 6 weeks  1. Decrease R shoulder pain to 3/10  2. Increase AROM R shoulder flex 120 deg, abd 115 deg, ER 75deg.  3. Increase PROM R shoulder flex, abd to 130 deg  4. R shoulder flex, abd MMT 4/5    Plan   Outpatient Physical Therapy 2 times weekly for 6 weeks to include the following interventions: Electrical Stimulation IFC, Manual Therapy, Moist Heat/ Ice, Neuromuscular Re-ed, Patient Education, Therapeutic Exercise and Ultrasound      Ryan Uribe, PT    minimum assist (75% patients effort)

## 2022-06-24 NOTE — PROGRESS NOTE ADULT - SUBJECTIVE AND OBJECTIVE BOX
Patient is a 76y old  Female who presents with a chief complaint of AMS (2022 13:46)        Over Night Events:  no acute events overnight      ROS:     All ROS are negative except HPI         PHYSICAL EXAM    ICU Vital Signs Last 24 Hrs  T(C): 35.7 (2022 07:10), Max: 36.3 (2022 23:24)  T(F): 96.2 (2022 07:10), Max: 97.4 (2022 23:24)  HR: 59 (2022 08:00) (51 - 77)  BP: 158/68 (2022 08:00) (136/71 - 173/72)  BP(mean): 98 (2022 08:00) (90 - 116)  ABP: --  ABP(mean): --  RR: 35 (2022 08:00) (20 - 58)  SpO2: 97% (2022 05:39) (95% - 100%)      CONSTITUTIONAL:  cachectic, frail in  NAD    ENT:   Airway patent,   Mouth with normal mucosa.   No thrush    EYES:   Pupils equal,   Round and reactive to light.    CARDIAC:   Normal rate,   Regular rhythm.    No edema      Vascular:  Normal systolic impulse  No Carotid bruits    RESPIRATORY:   No wheezing  Bilateral BS  Normal chest expansion  Not tachypneic,  No use of accessory muscles    GASTROINTESTINAL:  Abdomen soft,   Non-tender,   No guarding,     MUSCULOSKELETAL:   Range of motion is not limited,  No clubbing, cyanosis    NEUROLOGICAL:   Alert and oriented   No motor  deficits.    SKIN:   Skin normal color for race,   Warm and dry and intact.   No evidence of rash.        22 @ 07:01  -  22 @ 07:00  --------------------------------------------------------  IN:  Total IN: 0 mL    OUT:    Voided (mL): 425 mL  Total OUT: 425 mL    Total NET: -425 mL          LABS:                            12.2   7.12  )-----------( 272      ( 2022 05:33 )             37.4                                               06-    133<L>  |  96<L>  |  23<H>  ----------------------------<  94  4.8   |  28  |  1.1    Ca    9.4      2022 05:33  Phos  3.7     06-24  Mg     2.2     06-24    TPro  6.9  /  Alb  3.4<L>  /  TBili  0.6  /  DBili  x   /  AST  24  /  ALT  14  /  AlkPhos  68  06-24      PT/INR - ( 2022 05:33 )   PT: 12.10 sec;   INR: 1.05 ratio         PTT - ( 2022 05:33 )  PTT:26.2 sec                                       Urinalysis Basic - ( 2022 16:11 )    Color: Yellow / Appearance: Clear / S.015 / pH: x  Gluc: x / Ketone: Negative  / Bili: Negative / Urobili: 0.2 mg/dL   Blood: x / Protein: Trace mg/dL / Nitrite: Negative   Leuk Esterase: Large / RBC: 1-2 /HPF / WBC 26-50 /HPF   Sq Epi: x / Non Sq Epi: Occasional /HPF / Bacteria: Few        CARDIAC MARKERS ( 2022 05:36 )  x     / <0.01 ng/mL / x     / x     / x      CARDIAC MARKERS ( 2022 13:15 )  x     / <0.01 ng/mL / x     / x     / x                                                LIVER FUNCTIONS - ( 2022 05:33 )  Alb: 3.4 g/dL / Pro: 6.9 g/dL / ALK PHOS: 68 U/L / ALT: 14 U/L / AST: 24 U/L / GGT: x                                                                                                                                       MEDICATIONS  (STANDING):  albuterol/ipratropium for Nebulization. 3 milliLiter(s) Nebulizer once  ascorbic acid 500 milliGRAM(s) Oral daily  atorvastatin 80 milliGRAM(s) Oral at bedtime  benzonatate 100 milliGRAM(s) Oral three times a day  budesonide 160 MICROgram(s)/formoterol 4.5 MICROgram(s) Inhaler 2 Puff(s) Inhalation two times a day  cholecalciferol 2000 Unit(s) Oral daily  dorzolamide 2% Ophthalmic Solution 1 Drop(s) Both EYES <User Schedule>  lisinopril 10 milliGRAM(s) Oral daily  montelukast 10 milliGRAM(s) Oral daily  PARoxetine 10 milliGRAM(s) Oral daily  timolol 0.5% Solution 1 Drop(s) Both EYES two times a day    MEDICATIONS  (PRN):  acetaminophen     Tablet .. 975 milliGRAM(s) Oral every 6 hours PRN Mild Pain (1 - 3), Moderate Pain (4 - 6)  LORazepam   Injectable 2 milliGRAM(s) IV Push once PRN MRI associated anxiety  oxycodone    5 mG/acetaminophen 325 mG 1 Tablet(s) Oral every 6 hours PRN Moderate Pain (4 - 6)      New X-rays reviewed:                                                                                  ECHO    CXR interpreted by me:

## 2022-06-24 NOTE — OCCUPATIONAL THERAPY INITIAL EVALUATION ADULT - REHAB POTENTIAL, OT EVAL
The patient is a 51y Male complaining of altered mental status.
good, to achieve stated therapy goals

## 2022-06-25 LAB
ALBUMIN SERPL ELPH-MCNC: 3.2 G/DL — LOW (ref 3.5–5.2)
ALP SERPL-CCNC: 62 U/L — SIGNIFICANT CHANGE UP (ref 30–115)
ALT FLD-CCNC: 14 U/L — SIGNIFICANT CHANGE UP (ref 0–41)
ANION GAP SERPL CALC-SCNC: 11 MMOL/L — SIGNIFICANT CHANGE UP (ref 7–14)
AST SERPL-CCNC: 22 U/L — SIGNIFICANT CHANGE UP (ref 0–41)
BASOPHILS # BLD AUTO: 0.06 K/UL — SIGNIFICANT CHANGE UP (ref 0–0.2)
BASOPHILS NFR BLD AUTO: 0.7 % — SIGNIFICANT CHANGE UP (ref 0–1)
BILIRUB SERPL-MCNC: 0.3 MG/DL — SIGNIFICANT CHANGE UP (ref 0.2–1.2)
BUN SERPL-MCNC: 42 MG/DL — HIGH (ref 10–20)
CALCIUM SERPL-MCNC: 8.5 MG/DL — SIGNIFICANT CHANGE UP (ref 8.5–10.1)
CHLORIDE SERPL-SCNC: 98 MMOL/L — SIGNIFICANT CHANGE UP (ref 98–110)
CO2 SERPL-SCNC: 25 MMOL/L — SIGNIFICANT CHANGE UP (ref 17–32)
CREAT SERPL-MCNC: 1.1 MG/DL — SIGNIFICANT CHANGE UP (ref 0.7–1.5)
EGFR: 52 ML/MIN/1.73M2 — LOW
EOSINOPHIL # BLD AUTO: 0.32 K/UL — SIGNIFICANT CHANGE UP (ref 0–0.7)
EOSINOPHIL NFR BLD AUTO: 4 % — SIGNIFICANT CHANGE UP (ref 0–8)
GLUCOSE SERPL-MCNC: 98 MG/DL — SIGNIFICANT CHANGE UP (ref 70–99)
HCT VFR BLD CALC: 36.9 % — LOW (ref 37–47)
HGB BLD-MCNC: 11.8 G/DL — LOW (ref 12–16)
IMM GRANULOCYTES NFR BLD AUTO: 0.4 % — HIGH (ref 0.1–0.3)
LYMPHOCYTES # BLD AUTO: 2.21 K/UL — SIGNIFICANT CHANGE UP (ref 1.2–3.4)
LYMPHOCYTES # BLD AUTO: 27.6 % — SIGNIFICANT CHANGE UP (ref 20.5–51.1)
MAGNESIUM SERPL-MCNC: 2 MG/DL — SIGNIFICANT CHANGE UP (ref 1.8–2.4)
MCHC RBC-ENTMCNC: 30.1 PG — SIGNIFICANT CHANGE UP (ref 27–31)
MCHC RBC-ENTMCNC: 32 G/DL — SIGNIFICANT CHANGE UP (ref 32–37)
MCV RBC AUTO: 94.1 FL — SIGNIFICANT CHANGE UP (ref 81–99)
MONOCYTES # BLD AUTO: 1.01 K/UL — HIGH (ref 0.1–0.6)
MONOCYTES NFR BLD AUTO: 12.6 % — HIGH (ref 1.7–9.3)
NEUTROPHILS # BLD AUTO: 4.38 K/UL — SIGNIFICANT CHANGE UP (ref 1.4–6.5)
NEUTROPHILS NFR BLD AUTO: 54.7 % — SIGNIFICANT CHANGE UP (ref 42.2–75.2)
NRBC # BLD: 0 /100 WBCS — SIGNIFICANT CHANGE UP (ref 0–0)
PHOSPHATE SERPL-MCNC: 4 MG/DL — SIGNIFICANT CHANGE UP (ref 2.1–4.9)
PLATELET # BLD AUTO: 271 K/UL — SIGNIFICANT CHANGE UP (ref 130–400)
POTASSIUM SERPL-MCNC: 4 MMOL/L — SIGNIFICANT CHANGE UP (ref 3.5–5)
POTASSIUM SERPL-SCNC: 4 MMOL/L — SIGNIFICANT CHANGE UP (ref 3.5–5)
PROT SERPL-MCNC: 6.4 G/DL — SIGNIFICANT CHANGE UP (ref 6–8)
RBC # BLD: 3.92 M/UL — LOW (ref 4.2–5.4)
RBC # FLD: 13.2 % — SIGNIFICANT CHANGE UP (ref 11.5–14.5)
SODIUM SERPL-SCNC: 134 MMOL/L — LOW (ref 135–146)
WBC # BLD: 8.01 K/UL — SIGNIFICANT CHANGE UP (ref 4.8–10.8)
WBC # FLD AUTO: 8.01 K/UL — SIGNIFICANT CHANGE UP (ref 4.8–10.8)

## 2022-06-25 PROCEDURE — 99291 CRITICAL CARE FIRST HOUR: CPT

## 2022-06-25 PROCEDURE — 99232 SBSQ HOSP IP/OBS MODERATE 35: CPT

## 2022-06-25 RX ADMIN — Medication 1 DROP(S): at 05:54

## 2022-06-25 RX ADMIN — LISINOPRIL 10 MILLIGRAM(S): 2.5 TABLET ORAL at 05:54

## 2022-06-25 RX ADMIN — ATORVASTATIN CALCIUM 80 MILLIGRAM(S): 80 TABLET, FILM COATED ORAL at 21:08

## 2022-06-25 RX ADMIN — BUDESONIDE AND FORMOTEROL FUMARATE DIHYDRATE 2 PUFF(S): 160; 4.5 AEROSOL RESPIRATORY (INHALATION) at 08:20

## 2022-06-25 RX ADMIN — Medication 100 MILLIGRAM(S): at 15:13

## 2022-06-25 RX ADMIN — Medication 500 MILLIGRAM(S): at 11:55

## 2022-06-25 RX ADMIN — BUDESONIDE AND FORMOTEROL FUMARATE DIHYDRATE 2 PUFF(S): 160; 4.5 AEROSOL RESPIRATORY (INHALATION) at 19:59

## 2022-06-25 RX ADMIN — Medication 100 MILLIGRAM(S): at 21:09

## 2022-06-25 RX ADMIN — Medication 1 DROP(S): at 18:09

## 2022-06-25 RX ADMIN — MONTELUKAST 10 MILLIGRAM(S): 4 TABLET, CHEWABLE ORAL at 11:55

## 2022-06-25 RX ADMIN — Medication 100 MILLIGRAM(S): at 05:54

## 2022-06-25 RX ADMIN — Medication 10 MILLIGRAM(S): at 18:09

## 2022-06-25 RX ADMIN — DORZOLAMIDE HYDROCHLORIDE 1 DROP(S): 20 SOLUTION/ DROPS OPHTHALMIC at 19:59

## 2022-06-25 RX ADMIN — Medication 2000 UNIT(S): at 11:55

## 2022-06-25 RX ADMIN — Medication 81 MILLIGRAM(S): at 11:57

## 2022-06-25 RX ADMIN — DORZOLAMIDE HYDROCHLORIDE 1 DROP(S): 20 SOLUTION/ DROPS OPHTHALMIC at 09:00

## 2022-06-25 NOTE — PROGRESS NOTE ADULT - SUBJECTIVE AND OBJECTIVE BOX
Patient is a 76y old  Female who presents with a chief complaint of AMS (24 Jun 2022 14:02)      Over Night Events:  Patient seen and examined.   awake comfortable   s/p MRI no acute pathology     ROS:  See HPI    PHYSICAL EXAM    ICU Vital Signs Last 24 Hrs  T(C): 35.6 (24 Jun 2022 23:39), Max: 36.5 (24 Jun 2022 13:48)  T(F): 96.1 (24 Jun 2022 23:39), Max: 97.7 (24 Jun 2022 13:48)  HR: 51 (25 Jun 2022 05:45) (51 - 71)  BP: 133/58 (25 Jun 2022 05:45) (114/55 - 158/68)  BP(mean): 84 (25 Jun 2022 05:45) (74 - 98)  ABP: --  ABP(mean): --  RR: 26 (25 Jun 2022 05:45) (22 - 38)  SpO2: 97% (25 Jun 2022 05:45) (97% - 99%)      General:awake   HEENT:      annie          Lymph Nodes: NO cervical LN   Lungs: Bilateral BS  Cardiovascular: Regular   Abdomen: Soft, Positive BS  Extremities: No clubbing   Skin: warm   Neurological:   Musculoskeletal: move all ext     I&O's Detail    23 Jun 2022 07:01  -  24 Jun 2022 07:00  --------------------------------------------------------  IN:  Total IN: 0 mL    OUT:    Voided (mL): 425 mL  Total OUT: 425 mL    Total NET: -425 mL          LABS:                          12.2   7.12  )-----------( 272      ( 24 Jun 2022 05:33 )             37.4         24 Jun 2022 05:33    133    |  96     |  23     ----------------------------<  94     4.8     |  28     |  1.1      Ca    9.4        24 Jun 2022 05:33  Phos  3.7       24 Jun 2022 05:33  Mg     2.2       24 Jun 2022 05:33                                               PT/INR - ( 24 Jun 2022 05:33 )   PT: 12.10 sec;   INR: 1.05 ratio         PTT - ( 24 Jun 2022 05:33 )  PTT:26.2 sec                                                                                                                                                                                        MEDICATIONS  (STANDING):  albuterol/ipratropium for Nebulization. 3 milliLiter(s) Nebulizer once  ascorbic acid 500 milliGRAM(s) Oral daily  aspirin  chewable 81 milliGRAM(s) Oral daily  atorvastatin 80 milliGRAM(s) Oral at bedtime  benzonatate 100 milliGRAM(s) Oral three times a day  budesonide 160 MICROgram(s)/formoterol 4.5 MICROgram(s) Inhaler 2 Puff(s) Inhalation two times a day  cholecalciferol 2000 Unit(s) Oral daily  dorzolamide 2% Ophthalmic Solution 1 Drop(s) Both EYES <User Schedule>  enoxaparin Injectable 40 milliGRAM(s) SubCutaneous every 24 hours  lisinopril 10 milliGRAM(s) Oral daily  loperamide 2 milliGRAM(s) Oral once  montelukast 10 milliGRAM(s) Oral daily  PARoxetine 10 milliGRAM(s) Oral daily  timolol 0.5% Solution 1 Drop(s) Both EYES two times a day    MEDICATIONS  (PRN):  acetaminophen     Tablet .. 975 milliGRAM(s) Oral every 6 hours PRN Mild Pain (1 - 3), Moderate Pain (4 - 6)  oxycodone    5 mG/acetaminophen 325 mG 1 Tablet(s) Oral every 6 hours PRN Moderate Pain (4 - 6)        < from: MR Head No Cont (06.24.22 @ 14:15) >  .  No MRI evidence to suggest acute ischemic change.    2.  Cerebral and corpus callosal atrophy.    3.  T2/FLAIR hyperintensities in the periventricular white matter,   nonspecific, differential diagnostic possibilities include chronic   ischemic change, gliosis or demyelination.      < end of copied text >    Xrays:  TLC:  OG:  ET tube:                                                                                       ECHO:  [facility] ICU:

## 2022-06-25 NOTE — PROGRESS NOTE ADULT - SUBJECTIVE AND OBJECTIVE BOX
Neurology Follow up note    Name  JEAN BURNS    HPI:  HPI: 76yF HTN COPD on 3L home O2 p/w stroke sx - family called EMS for change in her breathing ~1220.  EMS found her tachypneic on her usual 3L home O2 but not hypoxic (98%); they upped her O2 to 4L but also noted R facial droop and weakened R , so they prenotified a stroke code en route to [facility] ED.  As per family, pt w/o prior hx stroke.  Pt is speaking in few words but unable to speak in full sentences, also different from baseline.     ED COURSE: NIH 14, given TPA in consultation with st, CT not showing any significant acute path (possible artifactual perfusion mismatch) after TPA pt remains AMS, is not cooperative with exam and BP remains persistently elevated.     Vital Signs Last 24 Hrs  T(F): 97.4 (22 Jun 2022 17:55), Max: 98.2 (22 Jun 2022 15:36)  HR: 69 (22 Jun 2022 18:14) (57 - 97)  BP: 182/79 (22 Jun 2022 18:14) (148/108 - 204/87)  RR: 22 (22 Jun 2022 18:14) (18 - 23)  SpO2: 99% (22 Jun 2022 18:14) (95% - 100%)     (22 Jun 2022 18:09)      Interval History -  This morning patient was alert and oriented and was following command. No evidence of acute infarct on Luis MRI. Exam is non focal.         Vital Signs Last 24 Hrs  T(C): 35.7 (25 Jun 2022 07:00), Max: 35.7 (25 Jun 2022 07:00)  T(F): 96.2 (25 Jun 2022 07:00), Max: 96.2 (25 Jun 2022 07:00)  HR: 51 (25 Jun 2022 05:45) (51 - 71)  BP: 102/51 (25 Jun 2022 07:00) (102/51 - 133/58)  BP(mean): 84 (25 Jun 2022 05:45) (80 - 84)  RR: 26 (25 Jun 2022 05:45) (22 - 34)  SpO2: 97% (25 Jun 2022 05:45) (97% - 99%)  ICU Vital Signs Last 24 Hrs  T(C): 35.7 (25 Jun 2022 07:00), Max: 35.7 (25 Jun 2022 07:00)  T(F): 96.2 (25 Jun 2022 07:00), Max: 96.2 (25 Jun 2022 07:00)  HR: 51 (25 Jun 2022 05:45) (51 - 71)  BP: 102/51 (25 Jun 2022 07:00) (102/51 - 133/58)  BP(mean): 84 (25 Jun 2022 05:45) (80 - 84)  ABP: --  ABP(mean): --  RR: 26 (25 Jun 2022 05:45) (22 - 34)  SpO2: 97% (25 Jun 2022 05:45) (97% - 99%)          Neurological Exam:   Mental status: Awake, alert and oriented x3. Naming, repetition and comprehension intact.  Attention/concentration intact.  No dysarthria, no aphasia.  Fund of knowledge appropriate.    Cranial nerves: Pupils equally round and reactive to light, visual fields full, no nystagmus, extraocular muscles intact, V1 through V3 intact bilaterally and symmetric, face symmetric, tongue was midline, Motor:  Normal bulk and tone, strength 5/5 in bilateral upper and lower extremities.   strength 5/5.  Rapid alternating movements intact and symmetric.   Sensation: Intact to light touch,  Coordination: No dysmetria on finger-to-nose  Reflexes: 2+ in upper and lower extremities      Medications  acetaminophen     Tablet .. 975 milliGRAM(s) Oral every 6 hours PRN  albuterol/ipratropium for Nebulization. 3 milliLiter(s) Nebulizer once  ascorbic acid 500 milliGRAM(s) Oral daily  aspirin  chewable 81 milliGRAM(s) Oral daily  atorvastatin 80 milliGRAM(s) Oral at bedtime  benzonatate 100 milliGRAM(s) Oral three times a day  budesonide 160 MICROgram(s)/formoterol 4.5 MICROgram(s) Inhaler 2 Puff(s) Inhalation two times a day  cholecalciferol 2000 Unit(s) Oral daily  dorzolamide 2% Ophthalmic Solution 1 Drop(s) Both EYES <User Schedule>  enoxaparin Injectable 40 milliGRAM(s) SubCutaneous every 24 hours  lisinopril 10 milliGRAM(s) Oral daily  loperamide 2 milliGRAM(s) Oral once  montelukast 10 milliGRAM(s) Oral daily  oxycodone    5 mG/acetaminophen 325 mG 1 Tablet(s) Oral every 6 hours PRN  PARoxetine 10 milliGRAM(s) Oral daily  timolol 0.5% Solution 1 Drop(s) Both EYES two times a day      Lab                        11.8   8.01  )-----------( 271      ( 25 Jun 2022 05:40 )             36.9     06-25    134<L>  |  98  |  42<H>  ----------------------------<  98  4.0   |  25  |  1.1    Ca    8.5      25 Jun 2022 05:40  Phos  4.0     06-25  Mg     2.0     06-25    TPro  6.4  /  Alb  3.2<L>  /  TBili  0.3  /  DBili  x   /  AST  22  /  ALT  14  /  AlkPhos  62  06-25    CAPILLARY BLOOD GLUCOSE        LIVER FUNCTIONS - ( 25 Jun 2022 05:40 )  Alb: 3.2 g/dL / Pro: 6.4 g/dL / ALK PHOS: 62 U/L / ALT: 14 U/L / AST: 22 U/L / GGT: x           PT/INR - ( 24 Jun 2022 05:33 )   PT: 12.10 sec;   INR: 1.05 ratio         PTT - ( 24 Jun 2022 05:33 )  PTT:26.2 sec      Brain MRI:     1.  No MRI evidence to suggest acute ischemic change.    2.  Cerebral and corpus callosal atrophy.    3.  T2/FLAIR hyperintensities in the periventricular white matter,   nonspecific, differential diagnostic possibilities include chronic   ischemic change, gliosis or demyelination.

## 2022-06-25 NOTE — PROGRESS NOTE ADULT - ASSESSMENT
Assessment:  This is a 76y Female with h/o COPD on PRN home O2 3LPM, Chronic Hypercapnic RF, recurrent PNA. and IBS presented to ED with acute RIGHT sided Weakness and Dysarthria.  s/p TPA in ED - now with resolution of symptoms    acute CVA? s/p TPA  - aspirin   - continue Lipitor   - MRI brain shows no acute CVA, possible area of demyelination?? Age related? Unclear if clinically significant   - PT/OT/SLP    - downgrade to step down

## 2022-06-25 NOTE — CHART NOTE - NSCHARTNOTEFT_GEN_A_CORE
Registered Dietitian Follow-Up     Patient Profile Reviewed                           Yes [X]   No []     Nutrition History Previously Obtained        Yes [X]  No []       Pertinent  Information: pt is 76 year old female with hx of COPD, IBS, recurrent PNA, BIBA 2/2 tachypnea with new onset R facial drop s/p TPA. now with new onset sinus arrythmia. Pt tolerating po diet well consumes >50% of meals. Diet education reviewed with pt, son and  at bedside for IBS.        Diet order:Diet, Low Fiber:   DASH/TLC {Sodium & Cholesterol Restricted}  Corn Restricted  No Beef  No Pork (06-23-22 @ 13:29) [Pending Verification By Attending]  Diet, DASH/TLC:   Sodium & Cholesterol Restricted (06-23-22 @ 12:51) [Active]           Anthropometrics:  - Ht. 157.5 cm  - Wt.  47.6  - %wt change  - BMI   - IBW      Pertinent Lab Data:  06-25    134<L>  |  98  |  42<H>  ----------------------------<  98  4.0   |  25  |  1.1    Ca    8.5      25 Jun 2022 05:40  Phos  4.0     06-25  Mg     2.0     06-25    TPro  6.4  /  Alb  3.2<L>  /  TBili  0.3  /  DBili  x   /  AST  22  /  ALT  14  /  AlkPhos  62  06-25                          11.8   8.01  )-----------( 271      ( 25 Jun 2022 05:40 )             36.9          Pertinent Meds:  MEDICATIONS  (STANDING):  albuterol/ipratropium for Nebulization. 3 milliLiter(s) Nebulizer once  ascorbic acid 500 milliGRAM(s) Oral daily  aspirin  chewable 81 milliGRAM(s) Oral daily  atorvastatin 80 milliGRAM(s) Oral at bedtime  benzonatate 100 milliGRAM(s) Oral three times a day  budesonide 160 MICROgram(s)/formoterol 4.5 MICROgram(s) Inhaler 2 Puff(s) Inhalation two times a day  cholecalciferol 2000 Unit(s) Oral daily  dorzolamide 2% Ophthalmic Solution 1 Drop(s) Both EYES <User Schedule>  enoxaparin Injectable 40 milliGRAM(s) SubCutaneous every 24 hours  lisinopril 10 milliGRAM(s) Oral daily  loperamide 2 milliGRAM(s) Oral once  montelukast 10 milliGRAM(s) Oral daily  PARoxetine 10 milliGRAM(s) Oral daily  timolol 0.5% Solution 1 Drop(s) Both EYES two times a day    MEDICATIONS  (PRN):  acetaminophen     Tablet .. 975 milliGRAM(s) Oral every 6 hours PRN Mild Pain (1 - 3), Moderate Pain (4 - 6)  oxycodone    5 mG/acetaminophen 325 mG 1 Tablet(s) Oral every 6 hours PRN Moderate Pain (4 - 6)       Physical Findings:  - Appearance: alert, orientated   - GI function: +BS   - Tubes: n/a   - Oral/Mouth cavity:  - Skin: intact      Nutrition Requirements  Weight Used:  47.6 kgs      Estimated Energy Needs    Continue []X    2245-0984 kcals (25-30 kcal/kg/BW)  57-67g protein (1.2-1.4g/kg/BW)  1;1 kcal for estimated fluid needs      Nutrient Intake: pt tolerating po diet well, consumed 75% of meals today         [] Previous Nutrition Diagnosis:            [] Ongoing          [X] Resolved       Nutrition Intervention: maintain on above diet      Goal/Expected Outcome: pt to continue to tolerate po diet and meet >80% of estimated nutrient needs      Indicator/Monitoring: RD to monitor tolerance to po diet, labs/meds, NFPF and f/u as needed within 4-6 days
Case discussed with ED attending. History obtained from ED attending  Patient with acute presentation of difficulty speaking and left gaze deviation. patient initail head Ct negative for bleed.   LKW is 12:20 pm.   No hypoxia.   CTA shows no LVO. CTP shows left PCA and frontal subcortical perfusion mismatch   Imp: Acute stroke  Agree with proceeding with IV TPA.  admit to MICU  - tpa orderset to be used as per TPA policy.

## 2022-06-25 NOTE — PROGRESS NOTE ADULT - ASSESSMENT
76yF HTN COPD on 3L home O2 p/w tachypnea and noted to have  R facial droop and weakened R  and word finding difficulty. NIH score was calculated 14. She received tpa in the ED and was admitted. After TPA pt remains AMS, is not cooperative with exam and BP remains persistently elevated on 180. Overnight Brain MRI showed no acute infarct. Currently patient is alert and oriented and follows commands. Exam shows no focal weakness and NIH score is 0.     Likely acute toxic metabolic encephalopathy in the setting of hypercapnia and elevated blood pressure     - Continue ASA 81   - Continue Lipitor 80  - Cardiology follow up suspected for AFIB and need for AC  - No contraindication for AC from neurology standpoint   - Downgrade for q8 neuro check   - Brain MRI shows no acute infarct. The area of white matter changes are chronic microvascular disease 2/2 chronic hypertension.

## 2022-06-25 NOTE — PROGRESS NOTE ADULT - SUBJECTIVE AND OBJECTIVE BOX
Pt seen and examined. Cousin and  at bedside.     T(F): , Max: 96.2 (06-25-22 @ 07:00)  HR: 51 (06-25-22 @ 05:45) (51 - 71)  BP: 102/51 (06-25-22 @ 07:00)  RR: 26 (06-25-22 @ 05:45)  SpO2: 97% (06-25-22 @ 05:45)  IN: 0 mL / OUT: 50 mL / NET: -50 mL      General: No apparent distress  Cardiovascular: S1, S2  Gastrointestinal: Soft, Non-tender, Non-distended  Respiratory: Good air entry bilaterally  Musculoskeletal: Moves all extremities  Lymphatic: No edema  Neurologic: No gross motor deficit. Stands up and balances on her own  Dermatologic: Skin dry  PT/INR - ( 24 Jun 2022 05:33 )   PT: 12.10 sec;   INR: 1.05 ratio         PTT - ( 24 Jun 2022 05:33 )  PTT:26.2 sec                        11.8   8.01  )-----------( 271      ( 25 Jun 2022 05:40 )             36.9     06-25    134<L>  |  98  |  42<H>  ----------------------------<  98  4.0   |  25  |  1.1    Ca    8.5      25 Jun 2022 05:40  Phos  4.0     06-25  Mg     2.0     06-25    TPro  6.4  /  Alb  3.2<L>  /  TBili  0.3  /  DBili  x   /  AST  22  /  ALT  14  /  AlkPhos  62  06-25

## 2022-06-25 NOTE — PROGRESS NOTE ADULT - ASSESSMENT
IMPRESSION:  AMS/?CVA SP TPA  Ho glaucoma  HO osteoporosis    PLAN:    CNS:  follow neurology if ok downgrade to floor     HEENT: Oral care    PULMONARY:  HOB @ 45 degrees.  Aspiration precautions. symbicort and spiriva    CARDIOVASCULAR: cardiology eval, BP control    GI: GI prophylaxis.  Feeding per S&S.  Bowel regimen     RENAL:  Follow up lytes.  Correct as needed    INFECTIOUS DISEASE: Follow up cultures. monitor off abx    HEMATOLOGICAL:  DVT ppx    ENDOCRINE:  Follow up FS.  Insulin protocol if needed.    MUSCULOSKELETAL: oobtc, pt/ot    dispo: per neurology recall prn

## 2022-06-26 ENCOUNTER — TRANSCRIPTION ENCOUNTER (OUTPATIENT)
Age: 77
End: 2022-06-26

## 2022-06-26 VITALS
RESPIRATION RATE: 16 BRPM | TEMPERATURE: 97 F | HEART RATE: 66 BPM | SYSTOLIC BLOOD PRESSURE: 114 MMHG | DIASTOLIC BLOOD PRESSURE: 56 MMHG

## 2022-06-26 LAB
ALBUMIN SERPL ELPH-MCNC: 3.1 G/DL — LOW (ref 3.5–5.2)
ALP SERPL-CCNC: 63 U/L — SIGNIFICANT CHANGE UP (ref 30–115)
ALT FLD-CCNC: 14 U/L — SIGNIFICANT CHANGE UP (ref 0–41)
ANION GAP SERPL CALC-SCNC: 11 MMOL/L — SIGNIFICANT CHANGE UP (ref 7–14)
AST SERPL-CCNC: 20 U/L — SIGNIFICANT CHANGE UP (ref 0–41)
BASOPHILS # BLD AUTO: 0.05 K/UL — SIGNIFICANT CHANGE UP (ref 0–0.2)
BASOPHILS NFR BLD AUTO: 0.8 % — SIGNIFICANT CHANGE UP (ref 0–1)
BILIRUB SERPL-MCNC: 0.3 MG/DL — SIGNIFICANT CHANGE UP (ref 0.2–1.2)
BUN SERPL-MCNC: 38 MG/DL — HIGH (ref 10–20)
CALCIUM SERPL-MCNC: 8.4 MG/DL — LOW (ref 8.5–10.1)
CHLORIDE SERPL-SCNC: 102 MMOL/L — SIGNIFICANT CHANGE UP (ref 98–110)
CO2 SERPL-SCNC: 24 MMOL/L — SIGNIFICANT CHANGE UP (ref 17–32)
CREAT SERPL-MCNC: 1.1 MG/DL — SIGNIFICANT CHANGE UP (ref 0.7–1.5)
EGFR: 52 ML/MIN/1.73M2 — LOW
EOSINOPHIL # BLD AUTO: 0.23 K/UL — SIGNIFICANT CHANGE UP (ref 0–0.7)
EOSINOPHIL NFR BLD AUTO: 3.7 % — SIGNIFICANT CHANGE UP (ref 0–8)
GLUCOSE SERPL-MCNC: 97 MG/DL — SIGNIFICANT CHANGE UP (ref 70–99)
HCT VFR BLD CALC: 35.1 % — LOW (ref 37–47)
HGB BLD-MCNC: 11.2 G/DL — LOW (ref 12–16)
IMM GRANULOCYTES NFR BLD AUTO: 0.2 % — SIGNIFICANT CHANGE UP (ref 0.1–0.3)
LYMPHOCYTES # BLD AUTO: 1.87 K/UL — SIGNIFICANT CHANGE UP (ref 1.2–3.4)
LYMPHOCYTES # BLD AUTO: 30 % — SIGNIFICANT CHANGE UP (ref 20.5–51.1)
MAGNESIUM SERPL-MCNC: 2 MG/DL — SIGNIFICANT CHANGE UP (ref 1.8–2.4)
MCHC RBC-ENTMCNC: 30.4 PG — SIGNIFICANT CHANGE UP (ref 27–31)
MCHC RBC-ENTMCNC: 31.9 G/DL — LOW (ref 32–37)
MCV RBC AUTO: 95.1 FL — SIGNIFICANT CHANGE UP (ref 81–99)
MONOCYTES # BLD AUTO: 0.84 K/UL — HIGH (ref 0.1–0.6)
MONOCYTES NFR BLD AUTO: 13.5 % — HIGH (ref 1.7–9.3)
NEUTROPHILS # BLD AUTO: 3.24 K/UL — SIGNIFICANT CHANGE UP (ref 1.4–6.5)
NEUTROPHILS NFR BLD AUTO: 51.8 % — SIGNIFICANT CHANGE UP (ref 42.2–75.2)
NRBC # BLD: 0 /100 WBCS — SIGNIFICANT CHANGE UP (ref 0–0)
PLATELET # BLD AUTO: 277 K/UL — SIGNIFICANT CHANGE UP (ref 130–400)
POTASSIUM SERPL-MCNC: 4.5 MMOL/L — SIGNIFICANT CHANGE UP (ref 3.5–5)
POTASSIUM SERPL-SCNC: 4.5 MMOL/L — SIGNIFICANT CHANGE UP (ref 3.5–5)
PROT SERPL-MCNC: 6.4 G/DL — SIGNIFICANT CHANGE UP (ref 6–8)
RBC # BLD: 3.69 M/UL — LOW (ref 4.2–5.4)
RBC # FLD: 13.2 % — SIGNIFICANT CHANGE UP (ref 11.5–14.5)
SODIUM SERPL-SCNC: 137 MMOL/L — SIGNIFICANT CHANGE UP (ref 135–146)
WBC # BLD: 6.24 K/UL — SIGNIFICANT CHANGE UP (ref 4.8–10.8)
WBC # FLD AUTO: 6.24 K/UL — SIGNIFICANT CHANGE UP (ref 4.8–10.8)

## 2022-06-26 PROCEDURE — 99238 HOSP IP/OBS DSCHRG MGMT 30/<: CPT

## 2022-06-26 RX ORDER — ASPIRIN/CALCIUM CARB/MAGNESIUM 324 MG
1 TABLET ORAL
Qty: 30 | Refills: 0
Start: 2022-06-26

## 2022-06-26 RX ORDER — ATORVASTATIN CALCIUM 80 MG/1
1 TABLET, FILM COATED ORAL
Qty: 30 | Refills: 0
Start: 2022-06-26

## 2022-06-26 RX ADMIN — Medication 10 MILLIGRAM(S): at 11:26

## 2022-06-26 RX ADMIN — LISINOPRIL 10 MILLIGRAM(S): 2.5 TABLET ORAL at 06:08

## 2022-06-26 RX ADMIN — DORZOLAMIDE HYDROCHLORIDE 1 DROP(S): 20 SOLUTION/ DROPS OPHTHALMIC at 08:57

## 2022-06-26 RX ADMIN — ENOXAPARIN SODIUM 40 MILLIGRAM(S): 100 INJECTION SUBCUTANEOUS at 11:25

## 2022-06-26 RX ADMIN — Medication 100 MILLIGRAM(S): at 06:54

## 2022-06-26 RX ADMIN — Medication 500 MILLIGRAM(S): at 11:25

## 2022-06-26 RX ADMIN — BUDESONIDE AND FORMOTEROL FUMARATE DIHYDRATE 2 PUFF(S): 160; 4.5 AEROSOL RESPIRATORY (INHALATION) at 08:57

## 2022-06-26 RX ADMIN — Medication 100 MILLIGRAM(S): at 13:24

## 2022-06-26 RX ADMIN — MONTELUKAST 10 MILLIGRAM(S): 4 TABLET, CHEWABLE ORAL at 11:26

## 2022-06-26 RX ADMIN — Medication 81 MILLIGRAM(S): at 11:26

## 2022-06-26 RX ADMIN — Medication 2000 UNIT(S): at 11:26

## 2022-06-26 RX ADMIN — Medication 1 DROP(S): at 06:08

## 2022-06-26 NOTE — DISCHARGE NOTE NURSING/CASE MANAGEMENT/SOCIAL WORK - NSDCPEFALRISK_GEN_ALL_CORE
For information on Fall & Injury Prevention, visit: [email or web address] OR  [email or web address] OR  https://www.cdc.gov/steadi/patient.html

## 2022-06-26 NOTE — DISCHARGE NOTE PROVIDER - ATTENDING DISCHARGE PHYSICAL EXAMINATION:
General : awake, alert, no acute distress, frail  Neck : no jvd  Resp: on o2, no labored breathing, no use of accessory muscles, decreased breath sounds, no wheezing, no rhonchi  CVS: rrr s1s2  GI: soft non tender non distended  ext: no edema no canosis  Neuro: awake, alert, no neurological focal deficits

## 2022-06-26 NOTE — DISCHARGE NOTE PROVIDER - CARE PROVIDER_API CALL
Be Christopher)  Family Medicine  [address] [place] [address]  Phone: [phone]  Fax: [phone]  Follow Up Time: 1 week    Neptali Aguirre)  Cardiovascular Disease; Internal Medicine  [address] [place] [address]  Phone: [phone]  Fax: [phone]  Follow Up Time: 1 week    Damián Moore)  Neurology  [address] [place] [address]  Phone: [phone]  Fax: [phone]  Follow Up Time: 1 week

## 2022-06-26 NOTE — DISCHARGE NOTE PROVIDER - PROVIDER TOKENS
PROVIDER:[TOKEN:[[id number]],FOLLOWUP:[1 week]],PROVIDER:[TOKEN:[[id number]],FOLLOWUP:[1 week]],PROVIDER:[TOKEN:[[id number]],FOLLOWUP:[1 week]]

## 2022-06-26 NOTE — DISCHARGE NOTE PROVIDER - NSDCFUSCHEDAPPT_GEN_ALL_CORE_FT
Jairo Duff  [facility]  PULMMED [facility]  Scheduled Appointment: 08/17/2022     Frank Awad  [facility]  CARDIOLOGY [address]  Scheduled Appointment: 08/18/2022    [facility]  NEUROLOGY [address]  Scheduled Appointment: 11/09/2022

## 2022-06-26 NOTE — DISCHARGE NOTE PROVIDER - NSDCCPCAREPLAN_GEN_ALL_CORE_FT
PRINCIPAL DISCHARGE DIAGNOSIS  Diagnosis: TIA (transient ischemic attack)  Assessment and Plan of Treatment: symptoms resolved  cont ASA and Lipitor  low fat diet  monitor BP  FUP PMD  FUP Dr Aguirre for MCOT   FUP Neuro      SECONDARY DISCHARGE DIAGNOSES  Diagnosis: HTN (hypertension)  Assessment and Plan of Treatment:     Diagnosis: COPD, severe  Assessment and Plan of Treatment:

## 2022-06-26 NOTE — DISCHARGE NOTE PROVIDER - NSDCMRMEDTOKEN_GEN_ALL_CORE_FT
Advair Diskus 250 mcg-50 mcg inhalation powder: 1 puff(s) inhaled 2 times a day  alendronate weekly:   aspirin 81 mg oral tablet, chewable: 1 tab(s) orally once a day  atorvastatin 80 mg oral tablet: 1 tab(s) orally once a day (at bedtime)  benzonatate 100 mg oral capsule: 1 cap(s) orally 3 times a day  dorzolamide-timolol 2%-0.5% preservative-free ophthalmic solution: 1 drop(s) to each affected eye 2 times a day  lisinopril 10 mg oral tablet: 1 tab(s) orally once a day  montelukast 10 mg oral tablet: 1 tab(s) orally once a day  oxycodone-acetaminophen 5 mg-325 mg oral tablet: 1 tab(s) orally 4 times a day MDD:4  PARoxetine 10 mg oral tablet: 1 tab(s) orally once a day  Vitamin C 500 mg oral tablet: 1 tab(s) orally once a day  Vitamin D3 50 mcg (2000 intl units) oral tablet: 1 tab(s) orally once a day

## 2022-06-26 NOTE — DISCHARGE NOTE PROVIDER - HOSPITAL COURSE
This is a 76y Female with h/o COPD on PRN home O2 3LPM, Chronic Hypercapnic RF, recurrent PNA. and IBS presented to ED with acute RIGHT sided Weakness and Dysarthria.  s/p TPA in ED - now with resolution of symptoms    Probable TIA s/p TPA  - aspirin   - continue Lipitor   - MRI brain shows no acute CVA, possible area of demyelination?? Age related? Unclear if clinically significant   - PT/OT/SLP   - FUP Dr Aguirre for MCOT OP  - FUP Neuro    Pt stable for DC    This is a 76y Female with h/o chronic  hypoxic hypercapnic secondary to advanced COPD on PRN home O2 3LPM,  , recurrent PNA. and IBS presented to ED with acute RIGHT sided Weakness and Dysarthria.  s/p TPA in ED - now with resolution of symptoms    Probable TIA /acute cva s/p TPA  -no neuro deficits   -reported rt face droopiness and rt hand weakness initially that resolved  - aspirin   - continue Lipitor   - MRI brain shows no acute CVA, possible area of demyelination?? Age related? Unclear if clinically significant   - conditoin improved  - no evidence of afib while in the hospital per cardiology   - FUP Dr Aguirre for MCOT OP  - FUP Neuro op     Pt stable for DC, discussed with family     This is a 76y Female with h/o chronic hypoxic hypercapnic secondary to advanced COPD on PRN home O2 3LPM, recurrent PNA. and IBS presented to ED with acute RIGHT sided Weakness and Dysarthria.    s/p TPA in ED   - now with resolution of symptoms    Probable TIA /acute cva s/p TPA  -no neuro deficits   -reported rt face droopiness and rt hand weakness initially that resolved  -aspirin  -continue Lipitor  -MRI brain shows no acute CVA, possible area of demyelination?? Age related? Unclear if clinically significant  -conditoin improved  -no evidence of afib while in the hospital per cardiology   -FUP Dr Aguirre for MCOT OP  -FUP Neuro op     Pt stable for DC, discussed with family       Altered mental status due to a TIA and or CVA could not be ruled out at the time of discharge

## 2022-06-26 NOTE — DISCHARGE NOTE PROVIDER - CARE PROVIDERS DIRECT ADDRESSES
,DirectAddress_Unknown,[email or web address],DirectAddress_Unknown

## 2022-06-27 RX ORDER — CLARITHROMYCIN 500 MG/1
500 TABLET, FILM COATED ORAL
Refills: 0 | Status: DISCONTINUED | COMMUNITY
End: 2022-06-27

## 2022-06-27 RX ORDER — BACILLUS COAGULANS/INULIN 1B-250 MG
CAPSULE ORAL
Refills: 0 | Status: DISCONTINUED | COMMUNITY
End: 2022-06-27

## 2022-06-27 RX ORDER — FERROUS SULFATE 325(65) MG
200 (65 FE) TABLET ORAL
Refills: 0 | Status: DISCONTINUED | COMMUNITY
End: 2022-06-27

## 2022-06-27 RX ORDER — NAPROXEN SODIUM 220 MG
TABLET ORAL
Refills: 0 | Status: DISCONTINUED | COMMUNITY
End: 2022-06-27

## 2022-06-27 RX ORDER — AZITHROMYCIN 250 MG/1
250 TABLET, FILM COATED ORAL
Qty: 1 | Refills: 0 | Status: DISCONTINUED | COMMUNITY
End: 2022-06-27

## 2022-06-27 RX ORDER — PREDNISONE 10 MG/1
10 TABLET ORAL
Qty: 20 | Refills: 2 | Status: DISCONTINUED | COMMUNITY
End: 2022-06-27

## 2022-06-27 RX ORDER — PROMETHAZINE HYDROCHLORIDE 6.25 MG/5ML
6.25 SOLUTION ORAL
Qty: 200 | Refills: 0 | Status: DISCONTINUED | COMMUNITY
Start: 2021-04-22 | End: 2022-06-27

## 2022-06-27 RX ORDER — IBUPROFEN 200 MG/1
TABLET, COATED ORAL
Refills: 0 | Status: DISCONTINUED | COMMUNITY
End: 2022-06-27

## 2022-06-27 RX ORDER — PREDNISONE 10 MG/1
10 TABLET ORAL
Qty: 60 | Refills: 0 | Status: DISCONTINUED | COMMUNITY
End: 2022-06-27

## 2022-06-28 ENCOUNTER — APPOINTMENT (OUTPATIENT)
Dept: GERIATRICS | Facility: HOME HEALTH | Age: 77
End: 2022-06-28
Payer: MEDICARE

## 2022-06-28 VITALS
RESPIRATION RATE: 16 BRPM | DIASTOLIC BLOOD PRESSURE: 68 MMHG | BODY MASS INDEX: 19.14 KG/M2 | TEMPERATURE: 98.2 F | HEIGHT: 62 IN | OXYGEN SATURATION: 94 % | WEIGHT: 104 LBS | SYSTOLIC BLOOD PRESSURE: 120 MMHG | HEART RATE: 18 BPM

## 2022-06-28 PROCEDURE — 99496 TRANSJ CARE MGMT HIGH F2F 7D: CPT

## 2022-06-28 RX ORDER — PAROXETINE HYDROCHLORIDE 40 MG/1
TABLET, FILM COATED ORAL
Refills: 0 | Status: DISCONTINUED | COMMUNITY
End: 2022-06-28

## 2022-06-28 RX ORDER — PAROXETINE HYDROCHLORIDE 10 MG/1
10 TABLET, FILM COATED ORAL
Refills: 0 | Status: DISCONTINUED | COMMUNITY
End: 2022-06-28

## 2022-06-28 NOTE — HISTORY OF PRESENT ILLNESS
[FreeTextEntry1] : Patient seen and examined at home.  Patient is homebound 2/2 generalized weakness/debility.  76F w/PMHx of COPD on PRN home O2 3LPM, Chronic Hypercapnic RF, recurrent PNA, IBS, ?TIA seen for initial home visit.  Patient recently admitted 6/22-6/26 2/2 acute onset of right sided weakness w/dysarthria.  Stroke suspected, given TPA and symptoms resolved.  Imaging showed no CVA, chronic microvascular associated changes 2/2 HTN noted.  Started on ASA/High Dose statin.  Also with ?AFIB, none found in hospital.  Patient needs MCOT for further eval.  Had an epsiode of syncope on the night of discharge at home.  EMS called and evaluated patient and found to be orthostatic.  Since then doing well.  No acute complaints.  No CP/SOB. No abdominal complaints with good appetite and regular BM's.  No urinary complaints.  No recent falls.  No skin wounds.  Patient lives with  and son.  No HHA.  Family heavily involved in care.  Patient ambulates independently.  Not getting PT/OT.  Refusing.

## 2022-06-28 NOTE — PHYSICAL EXAM
[Alert] : alert [No Acute Distress] : in no acute distress [Normal Outer Ear/Nose] : the ears and nose were normal in appearance [Normal Appearance] : the appearance of the neck was normal [Supple] : the neck was supple [No Respiratory Distress] : no respiratory distress [No Acc Muscle Use] : no accessory muscle use [Respiration, Rhythm And Depth] : normal respiratory rhythm and effort [Auscultation Breath Sounds / Voice Sounds] : lungs were clear to auscultation bilaterally [Heart Rate And Rhythm] : heart rate was normal and rhythm regular [Bowel Sounds] : normal bowel sounds [Abdomen Tenderness] : non-tender [Abdomen Soft] : soft [No Spinal Tenderness] : no spinal tenderness [Normal Color / Pigmentation] : normal skin color and pigmentation [Normal Turgor] : normal skin turgor [No Focal Deficits] : no focal deficits [Normal Affect] : the affect was normal [Normal Mood] : the mood was normal [Sclera] : the sclera and conjunctiva were normal [Normal S1, S2] : normal S1 and S2 [Edema] : edema was not present [de-identified] : dec airflow b/l

## 2022-06-28 NOTE — ASSESSMENT
[FreeTextEntry1] : s/p recent hospitalization 2/2 right sided weakness/dysarthria s/p TPA\par - no CVA appreciated on MRI\par - c/w ASA/Statin \par - outpatient Neuro FU as scheduled\par - ?AFIB, needs outpatient Cards FU for MCOT\par - ECHO 6/22\par      Summary:\par  1. Left ventricular ejection fraction, by visual estimation, is 55 to \par 60%.\par  2. Normal right ventricular size and function.\par  3. Normal left atrial size.\par  4. There is no evidence of pericardial effusion.\par  5. No evidence of mitral valve regurgitation.\par  6. Mild tricuspid regurgitation.\par  7. Mild aortic regurgitation.\par  8. Normal trileaflet aortic valve with normal opening.\par \par COPD, CHRF\par - at baseline\par - c/w LABA/ICS/LAMA\par - b2 PRN\par - o2 PRN\par - Outpatient Pulm FU as scheduled\par \par HTN\par - c/w Lisinopril\par - DASH Diet\par \par MDD\par - Paroxetine taper, patient reports no Hx of MDD/STEVE\par \par Vitamin Deficiency\par - c/w PO supplements\par \par CKD 3\par - baseline Scr aprox 1.1\par - avoid Nephrotoxics\par - periodic monitoring

## 2022-07-01 DIAGNOSIS — R53.1 WEAKNESS: ICD-10-CM

## 2022-07-01 DIAGNOSIS — J96.12 CHRONIC RESPIRATORY FAILURE WITH HYPERCAPNIA: ICD-10-CM

## 2022-07-01 DIAGNOSIS — I10 ESSENTIAL (PRIMARY) HYPERTENSION: ICD-10-CM

## 2022-07-01 DIAGNOSIS — H51.8 OTHER SPECIFIED DISORDERS OF BINOCULAR MOVEMENT: ICD-10-CM

## 2022-07-01 DIAGNOSIS — R47.1 DYSARTHRIA AND ANARTHRIA: ICD-10-CM

## 2022-07-01 DIAGNOSIS — H40.9 UNSPECIFIED GLAUCOMA: ICD-10-CM

## 2022-07-01 DIAGNOSIS — Z88.0 ALLERGY STATUS TO PENICILLIN: ICD-10-CM

## 2022-07-01 DIAGNOSIS — Z99.81 DEPENDENCE ON SUPPLEMENTAL OXYGEN: ICD-10-CM

## 2022-07-01 DIAGNOSIS — G92.8 OTHER TOXIC ENCEPHALOPATHY: ICD-10-CM

## 2022-07-01 DIAGNOSIS — M81.0 AGE-RELATED OSTEOPOROSIS WITHOUT CURRENT PATHOLOGICAL FRACTURE: ICD-10-CM

## 2022-07-01 DIAGNOSIS — G45.9 TRANSIENT CEREBRAL ISCHEMIC ATTACK, UNSPECIFIED: ICD-10-CM

## 2022-07-01 DIAGNOSIS — R29.810 FACIAL WEAKNESS: ICD-10-CM

## 2022-07-01 DIAGNOSIS — K58.9 IRRITABLE BOWEL SYNDROME WITHOUT DIARRHEA: ICD-10-CM

## 2022-07-01 DIAGNOSIS — I63.9 CEREBRAL INFARCTION, UNSPECIFIED: ICD-10-CM

## 2022-07-01 DIAGNOSIS — Z87.01 PERSONAL HISTORY OF PNEUMONIA (RECURRENT): ICD-10-CM

## 2022-07-01 DIAGNOSIS — Z71.3 DIETARY COUNSELING AND SURVEILLANCE: ICD-10-CM

## 2022-07-01 DIAGNOSIS — R47.01 APHASIA: ICD-10-CM

## 2022-07-01 DIAGNOSIS — J44.1 CHRONIC OBSTRUCTIVE PULMONARY DISEASE WITH (ACUTE) EXACERBATION: ICD-10-CM

## 2022-07-01 DIAGNOSIS — J47.9 BRONCHIECTASIS, UNCOMPLICATED: ICD-10-CM

## 2022-07-01 DIAGNOSIS — R06.82 TACHYPNEA, NOT ELSEWHERE CLASSIFIED: ICD-10-CM

## 2022-07-06 ENCOUNTER — NON-APPOINTMENT (OUTPATIENT)
Age: 77
End: 2022-07-06

## 2022-07-07 ENCOUNTER — APPOINTMENT (OUTPATIENT)
Dept: NEUROLOGY | Facility: CLINIC | Age: 77
End: 2022-07-07

## 2022-07-07 VITALS
HEART RATE: 80 BPM | SYSTOLIC BLOOD PRESSURE: 117 MMHG | OXYGEN SATURATION: 96 % | HEIGHT: 62 IN | BODY MASS INDEX: 19.14 KG/M2 | TEMPERATURE: 97.6 F | WEIGHT: 104 LBS | DIASTOLIC BLOOD PRESSURE: 74 MMHG

## 2022-07-07 PROCEDURE — 99215 OFFICE O/P EST HI 40 MIN: CPT

## 2022-07-07 NOTE — REASON FOR VISIT
[Follow-Up: _____] : a [unfilled] follow-up visit [Spouse] : spouse [FreeTextEntry1] : ?TIA vs tPA aborted stroke.

## 2022-07-07 NOTE — PHYSICAL EXAM
[FreeTextEntry1] : Focal neurological exam:\par \par MS: Awake, alert, oriented to person, place, situation and time. Normal affect. Follows commands. Can follow cross command.\par \par Language: Speech is clear, fluent with good repetition & comprehension. No dysarthria. \par \par CNs 2 - 12 intact. EOMI no nystagmus, no diplopia. VFF. No facial asymmetry b/l, full eye closure strength b/l. [place] with hearing aides. Head turning & shoulder shrug intact b/l. Tongue midline, normal movements, no atrophy.\par \par Motor: Decreased muscle bulk. No noticeable tremor or seizure. No pronator drift. Muscle strength of b/l UE and b/l LE 5/5. \par \par Reflexes: DTR of biceps 1+, knees 2+\par \par Sensation: Intact to LT, temperature and vibration b/l throughout\par \par Cortical: No extinction\par \par Coordination: No dysmetria to FTN.\par \par Gait: Stooped posture. Normal stance and tandem gait. Not using AD. \par \par NIHSS 0 \par \par \par \par \par

## 2022-07-07 NOTE — HISTORY OF PRESENT ILLNESS
[FreeTextEntry1] : Patient is 77 yo RH woman with PMHx of HTN, COPD on 3L home O2 (from second hand smoke), IBS who presents as HFU from 6/23/22 for ?TIA vs tPA aborted stroke.  \par \par She pw c/o of R facial, weak RH , unable to speak in full sentences, tachypnea and confusion. NIHSS 14, tPA was given.  \par \par Per  today, EMS told her she was dehydrated and right after tPA was administered, sx resolved. \par \par MRI H: No acute ischemic changes. Cerebral and corpus callosal atrophy. T2/FLAIR hyperintensities in the periventricular white matter, nonspecific, differential diagnostic possibilities include chronic ischemic change, gliosis or demyelination. (**Per Neuro appears to be more chronic WMD bc of HTN) \par \par TTE: LVEF 55-60%, nml LA size \par  \par CTP shows left PCA and frontal subcortical perfusion mismatch \par \par CTA N: Motion limited, especially at the level of the carotid bifurcations. No gross evidence of major vascular stenosis or occlusion. \par \par CTA H: No evidence of major vascular stenosis or occlusion. 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 \par \par There was concern for Afib, but per Cards none detected during stay \par Sent home on ASA 81 and Lipitor 80 \par \par LDL 57, no A1c done \par Today, she states she sleeps on three pillows. BP meds were increased inpatient. Can do all ADLs independently.  drove them to appt today. She feels back to baseline today. \par PTA: No antiplatelet or statin \par \par \par

## 2022-07-07 NOTE — ASSESSMENT
[FreeTextEntry1] : Patient is 75 yo RH woman with PMHx of HTN, COPD on 3L home O2 (from second hand smoke), IBS who presents as HFU from 6/23/22 for ?TIA vs tPA aborted stroke. She pw c/o of R facial, weak RH , unable to speak in full sentences, tachypnea and confusion. NIHSS 14, tPA was given and sx resolved. Today, she reports being back to baseline. NIHSS 0. She cannot tolerate imaging lying down bc of back pain and COPD. \par \par PLAN: \par -Will check with attending for need to repeat vessel imaging and if I should refer to Dr. Collier for L supraclinoid ICA saccular aneurysm\par -BP goal <140/80\par -F/u with Cardiology for 30 day MCOT, will be seeing Cards tomorrow \par -Keep hydrated \par -C/w ASA 81 \par -Decrease to Lipitor 10 and repeat lipid panel and A1c in 1 mo. Requesting home draw. \par \par Aggressive risk factor modification should be continued for secondary stroke prevention, consisting of intensive blood pressure control and cholesterol monitoring. I encouraged the patient to discuss these important issues with her primary care doctor.  \par \par I have reviewed the goals of stroke risk factor modification. I counseled the patient on measures to reduce stroke risk, including the importance of medication compliance, risk factor control, exercise, healthy diet and avoidance of smoking. I reviewed stroke warning signs and symptoms and appropriate actions to take if such occur.\par

## 2022-07-08 ENCOUNTER — RESULT CHARGE (OUTPATIENT)
Age: 77
End: 2022-07-08

## 2022-07-08 ENCOUNTER — APPOINTMENT (OUTPATIENT)
Dept: CARDIOLOGY | Facility: CLINIC | Age: 77
End: 2022-07-08

## 2022-07-08 VITALS
SYSTOLIC BLOOD PRESSURE: 120 MMHG | WEIGHT: 104 LBS | BODY MASS INDEX: 19.14 KG/M2 | DIASTOLIC BLOOD PRESSURE: 70 MMHG | HEART RATE: 62 BPM | TEMPERATURE: 97.6 F | HEIGHT: 62 IN

## 2022-07-08 PROCEDURE — 93000 ELECTROCARDIOGRAM COMPLETE: CPT

## 2022-07-08 PROCEDURE — 99204 OFFICE O/P NEW MOD 45 MIN: CPT

## 2022-07-08 RX ORDER — OXYCODONE AND ACETAMINOPHEN 5; 325 MG/1; MG/1
5-325 TABLET ORAL
Qty: 12 | Refills: 0 | Status: DISCONTINUED | COMMUNITY
Start: 2022-06-07 | End: 2022-07-08

## 2022-07-08 RX ORDER — ATORVASTATIN CALCIUM 80 MG/1
80 TABLET, FILM COATED ORAL
Qty: 90 | Refills: 3 | Status: DISCONTINUED | COMMUNITY
End: 2022-07-08

## 2022-07-08 RX ORDER — ALBUTEROL SULFATE 2.5 MG/3ML
(2.5 MG/3ML) SOLUTION RESPIRATORY (INHALATION) 4 TIMES DAILY
Qty: 2 | Refills: 3 | Status: DISCONTINUED | COMMUNITY
End: 2022-07-08

## 2022-07-08 RX ORDER — LISINOPRIL 5 MG/1
5 TABLET ORAL
Qty: 90 | Refills: 0 | Status: DISCONTINUED | COMMUNITY
Start: 2021-10-19 | End: 2022-07-08

## 2022-07-08 RX ORDER — ALBUTEROL SULFATE 90 UG/1
108 (90 BASE) INHALANT RESPIRATORY (INHALATION)
Qty: 1 | Refills: 5 | Status: DISCONTINUED | COMMUNITY
End: 2022-07-08

## 2022-07-08 NOTE — PHYSICAL EXAM
[de-identified] : thin / well appearing, no distress [de-identified] : reg, nL s1/s2, no m/r/g [de-identified] : CTA [de-identified] : warm, no edema [de-identified] : alert, normal affect, logical conversation

## 2022-07-08 NOTE — DISCUSSION/SUMMARY
[FreeTextEntry1] : Cont ASA\par Cont Lipitor\par Cont Lisinopril\par MCOT to evaluate for AF.\par Follow-up 6-weeks.

## 2022-07-08 NOTE — ASSESSMENT
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  Mild AI / TR.

## 2022-07-08 NOTE — REASON FOR VISIT
[FreeTextEntry1] : 76 year-old female hospital follow-up (CVA).\par \par No cardiac history.\par Risk factors include HTN.\par \par CVA (7/2022).  Facial droop / right hand weakness s/p TPA with resolution.\par \par CTA neck unremarkable.  Occult etiology.\par \par No palpitations.\par \par No chest pain.  Stable exertional dyspnea (COPD)\par \par ECHO (7/2022): nL LVSF.  Mild AI / TR.\par \par Hospital labs:\par Cr 1.12\par CBC / CMP / lipids otherwise unremarkable.\par \par PMH / PSH:\par HTN\par COPD / Bronchiectasis (intermittent O2)\par IBS\par \par Pilonidal cyst\par \par SOCIAL:\par Nonsmoker (secondary exposure)

## 2022-07-21 RX ORDER — BENZONATATE 100 MG/1
100 CAPSULE ORAL 3 TIMES DAILY
Qty: 30 | Refills: 3 | Status: COMPLETED | COMMUNITY
End: 2022-07-21

## 2022-07-22 ENCOUNTER — APPOINTMENT (OUTPATIENT)
Dept: CARDIOLOGY | Facility: CLINIC | Age: 77
End: 2022-07-22

## 2022-07-25 ENCOUNTER — NON-APPOINTMENT (OUTPATIENT)
Age: 77
End: 2022-07-25

## 2022-08-01 NOTE — CDI QUERY NOTE - NSCDIOTHERTXTBX_GEN_ALL_CORE_HH
CLINICAL INDICATORS:    EMS 6/22: dispatched for difficulty breathing. Chief complain(primary) possible stroke. RR 18 effort normal on 6LPM    ED 6/22-COPD. EMS found her tachypneic on her usual 3L home O2 but not hypoxic (98%); they upped her O2 to 4L.  Pt is speaking in few words but unable to speak in full sentences, also different from baseline  ROS: Respiratory: + respiratory distress; Phys exam: RESP- tachypneic, on supplemental O2.  Clinical summary: in addition to Duonebs and steroids for resp distress, likely COPD exacerbation    Last neuro PN 6/25- Reason for admission: AMS. ED COURSE: NIH 14, given TPA in consultation with st, CT not showing any significant acute path (possible artifactual perfusion mismatch)  after TPA pt remains AMS, is not cooperative with exam and BP remains persistently elevated.  Assessment: Likely acute toxic metabolic encephalopathy in the setting of hypercapnia and elevated blood pressure  - Continue ASA 81, - Continue Lipitor 80, - Downgrade for q8 neuro check  - Brain MRI shows no acute infarct. The area of white matter changes are chronic microvascular disease 2/2 chronic hypertension.    DC Summary 6/26- presented to ED with acute RIGHT sided Weakness and Dysarthria.  s/p TPA in ED - now with resolution of symptoms. Probable TIA /acute cva s/p TPA  reported rt face droopiness and rt hand weakness initially that resolved    Neuro flowsheet:  6/22- lethargic, arouses to pain, disoriented to person place situation, incoherent, garbled anxious  6/23- WDL, alert, Ox4, clear, spontaneous, calm, cooperative  6/24- WDL  6/25- WDL, alert, arouses to voice Ox4, clear, calm, cooperative    Orders-  6/22 Admit to IP: Stroke, HTN, COPD severe; bed: ICU  6/22- neuro check Q1 hour  6/23- neuro check Q4 hours  6/25- neuro check Q8 hours    MRI head 6/24-IMPRESSION:  1.  No MRI evidence to suggest acute ischemic change.  2.  Cerebral and corpus callosal atrophy.  3.  T2/FLAIR hyperintensities in the periventricular white matter,  nonspecific, differential diagnostic possibilities include chronic  ischemic change, gliosis or demyelination.       6/22-VBG: pH 7.38, pCO2 49            Based on the above clinical indicators, and your professional judgement, can the patient's AMS, lethargy, disorientation be further clarified?      Altered mental status was multifactorial: TIA/CVA treated withe TPA and Toxic/Metabolic encephalopathy due to hypercapnia from a COPD exacerbation    Altered mental status due to a TIA and or CVA could not be ruled out at the time of discharge    Other, please specify:    Clinically unable to determine        Lolis Urias RN, BSN, BBA, CCDS, CCS  [phone]
CLINICAL INDICATORS:  EMS 6/22: dispatched for difficulty breathing. Chief complain(primary) possible stroke.   RR 18 effort normal, on 6LPM    ED 6/22-COPD. EMS found her tachypneic on her usual 3L home O2 but not hypoxic (98%); they upped her O2 to 4L.  Pt is speaking in few words but unable to speak in full sentences, also different from baseline  ROS: Respiratory: + respiratory distress; Phys exam: RESP- tachypneic, on supplemental O2.  Clinical summary: in addition to Duonebs and steroids for resp distress, likely COPD exacerbation    Last neuro PN 6/25- Reason for admission: AMS. ED COURSE: NIH 14, given TPA in consultation with st, CT not showing any significant acute path (possible artifactual perfusion mismatch)  after TPA pt remains AMS, is not cooperative with exam and BP remains persistently elevated.  Assessment: Likely acute toxic metabolic encephalopathy in the setting of hypercapnia and elevated blood pressure    Respiratory flowsheet:  6/22- RR 54, 34, 20, 18, 18, 18 (no liter amount noted)  6/22- Sats 88, 97, 97, 98, 97, 98, 95  6/23- 4LNC    6/22-VBG: pH 7.38, pCO2 49                      Based on the above clinical indicators, and your professional judgement, can the patient's respiratory status be further clarified?    Acute on chronic respiratory failure with hypercapnia and COPD exacerbation    Chronic respiratory failure with hypercapnia and COPD exacerbation    Other, please specify:    Clinical unable to determine            Lolis Urias RN, BSN, BBA, CCDS, CCS  [phone]

## 2022-08-03 ENCOUNTER — NON-APPOINTMENT (OUTPATIENT)
Age: 77
End: 2022-08-03

## 2022-08-03 LAB
CHOLEST SERPL-MCNC: 96 MG/DL
ESTIMATED AVERAGE GLUCOSE: 105 MG/DL
HBA1C MFR BLD HPLC: 5.3 %
HDLC SERPL-MCNC: 59 MG/DL
LDLC SERPL CALC-MCNC: 26 MG/DL
NONHDLC SERPL-MCNC: 37 MG/DL
TRIGL SERPL-MCNC: 56 MG/DL

## 2022-08-12 ENCOUNTER — APPOINTMENT (OUTPATIENT)
Dept: CARDIOLOGY | Facility: CLINIC | Age: 77
End: 2022-08-12

## 2022-08-12 PROCEDURE — 93228 REMOTE 30 DAY ECG REV/REPORT: CPT

## 2022-08-17 ENCOUNTER — APPOINTMENT (OUTPATIENT)
Age: 77
End: 2022-08-17

## 2022-08-17 PROCEDURE — 99213 OFFICE O/P EST LOW 20 MIN: CPT | Mod: 95

## 2022-08-17 NOTE — REASON FOR VISIT
[Home] : at home, [unfilled] , at the time of the visit. [Medical Office: ([facility])___] : at the medical office located in  [Family Member] : family member [Patient] : the patient

## 2022-08-17 NOTE — ASSESSMENT
[FreeTextEntry1] : status unchanged slow decline overall \par Assessment:\par COPD  \par \par plan:\par Stress compliance with inhalers. Renewed today.\par cont PRN albuterol\par cont ICS/LABA\par needs PFT\par weight loss\par PRN prednisone start 20 mg for week then 10 week\par F/U 6 months\par \par \par \par \par

## 2022-08-18 ENCOUNTER — APPOINTMENT (OUTPATIENT)
Dept: CARDIOLOGY | Facility: CLINIC | Age: 77
End: 2022-08-18

## 2022-08-19 ENCOUNTER — APPOINTMENT (OUTPATIENT)
Dept: CARDIOLOGY | Facility: CLINIC | Age: 77
End: 2022-08-19

## 2022-08-19 PROCEDURE — 99442: CPT

## 2022-08-21 ENCOUNTER — APPOINTMENT (OUTPATIENT)
Dept: GERIATRICS | Facility: HOME HEALTH | Age: 77
End: 2022-08-21

## 2022-08-21 PROCEDURE — 99441: CPT

## 2022-09-05 NOTE — ASSESSMENT
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  Mild AI / TR.

## 2022-09-05 NOTE — DISCUSSION/SUMMARY
[FreeTextEntry1] : Cont ASA\par Cont Lipitor\par Cont Lisinopril\par \par Recommend ILR for further AF monitoring.  Procedure discussed.  Referred to EP.\par \par Follow-up 6-months.\par \par 15-minute encounter.

## 2022-09-05 NOTE — REASON FOR VISIT
[FreeTextEntry1] : The patient, Sarahi Matos, and I participated in a TeleHealth encounter.\par Patient provided verbal consent.\par Accompanied by family members on phone.\par \par Essentially stable.\par \par No palpitations.  No recurrent neuro symptoms.\par \par MCOT discussed.  No AF.

## 2022-09-13 ENCOUNTER — APPOINTMENT (OUTPATIENT)
Dept: GERIATRICS | Facility: HOME HEALTH | Age: 77
End: 2022-09-13

## 2022-09-13 VITALS
SYSTOLIC BLOOD PRESSURE: 130 MMHG | DIASTOLIC BLOOD PRESSURE: 62 MMHG | HEART RATE: 64 BPM | TEMPERATURE: 98.2 F | RESPIRATION RATE: 18 BRPM | OXYGEN SATURATION: 88 %

## 2022-09-13 PROCEDURE — 99349 HOME/RES VST EST MOD MDM 40: CPT

## 2022-09-13 RX ORDER — PAROXETINE HYDROCHLORIDE 10 MG/1
10 TABLET, FILM COATED ORAL
Refills: 0 | Status: DISCONTINUED | COMMUNITY
End: 2022-09-13

## 2022-09-13 RX ORDER — ATORVASTATIN CALCIUM 10 MG/1
10 TABLET, FILM COATED ORAL DAILY
Qty: 90 | Refills: 3 | Status: DISCONTINUED | COMMUNITY
Start: 2022-07-07 | End: 2022-09-13

## 2022-09-13 NOTE — PHYSICAL EXAM
[Alert] : alert [No Acute Distress] : in no acute distress [Sclera] : the sclera and conjunctiva were normal [Normal Outer Ear/Nose] : the ears and nose were normal in appearance [Normal Appearance] : the appearance of the neck was normal [Supple] : the neck was supple [No Respiratory Distress] : no respiratory distress [No Acc Muscle Use] : no accessory muscle use [Respiration, Rhythm And Depth] : normal respiratory rhythm and effort [Auscultation Breath Sounds / Voice Sounds] : lungs were clear to auscultation bilaterally [Normal S1, S2] : normal S1 and S2 [Heart Rate And Rhythm] : heart rate was normal and rhythm regular [Edema] : edema was not present [Abdomen Tenderness] : non-tender [Abdomen Soft] : soft [No Spinal Tenderness] : no spinal tenderness [No Clubbing, Cyanosis] : no clubbing or cyanosis of the fingernails [Involuntary Movements] : no involuntary movements were seen [Normal Color / Pigmentation] : normal skin color and pigmentation [Normal Turgor] : normal skin turgor [No Focal Deficits] : no focal deficits [Oriented To Time, Place, And Person] : oriented to person, place, and time [de-identified] : dec airflow b/l

## 2022-09-13 NOTE — ASSESSMENT
[FreeTextEntry1] : Hx of TIA s/p TPA 6/22\par - no CVA appreciated on MRI\par - c/w ASA, Statin stopped by Neuro.  Will recheck Lipids at next Neuro visit.\par - outpatient Neuro FU as scheduled\par - ?AFIB, had MCOT, now needs ILR.  Referred to EPS\par - ECHO 6/22 in note 6/22\par \par COPD, CHRF\par - at baseline\par - c/w LABA/ICS/LAMA\par - b2 PRN\par - o2 PRN\par - Outpatient Pulm FU as scheduled\par \par HTN\par - c/w Lisinopril\par - DASH Diet\par \par Vitamin Deficiency\par - c/w PO supplements\par \par CKD 3\par - baseline Scr aprox 1.1\par - avoid Nephrotoxics\par - periodic monitoring. \par

## 2022-09-13 NOTE — HISTORY OF PRESENT ILLNESS
[FreeTextEntry1] : Patient seen and examined at home.  Patient is homebound 2/2 generalized weakness/debility.  Son Jacob present during visit.  All report patient at baseline.  No acute complaints.  No CP/SOB. No abdominal complaints with good appetite and regular BM's.  No urinary complaints.  No recent falls.  No skin wounds.  Followed with Cards, MCOT negative, now being referred for ILR.  Waiting on appt.  Followed with Neuro, Statin stopped.  Next FU 6 months.  Followed with Pulm, no changes.

## 2022-09-30 ENCOUNTER — APPOINTMENT (OUTPATIENT)
Dept: CARDIOLOGY | Facility: CLINIC | Age: 77
End: 2022-09-30

## 2022-09-30 PROCEDURE — 99442: CPT

## 2022-10-09 ENCOUNTER — RX RENEWAL (OUTPATIENT)
Age: 77
End: 2022-10-09

## 2022-10-13 ENCOUNTER — APPOINTMENT (OUTPATIENT)
Dept: CARDIOLOGY | Facility: CLINIC | Age: 77
End: 2022-10-13

## 2022-10-13 VITALS
HEIGHT: 62 IN | HEART RATE: 77 BPM | RESPIRATION RATE: 18 BRPM | WEIGHT: 105 LBS | SYSTOLIC BLOOD PRESSURE: 118 MMHG | DIASTOLIC BLOOD PRESSURE: 70 MMHG | BODY MASS INDEX: 19.32 KG/M2 | TEMPERATURE: 97.8 F

## 2022-10-13 PROCEDURE — 99204 OFFICE O/P NEW MOD 45 MIN: CPT | Mod: 25

## 2022-10-13 PROCEDURE — 93000 ELECTROCARDIOGRAM COMPLETE: CPT

## 2022-10-13 RX ORDER — CHOLECALCIFEROL (VITAMIN D3) 125 MCG
50 MCG TABLET ORAL
Refills: 0 | Status: ACTIVE | COMMUNITY

## 2022-10-13 RX ORDER — ALBUTEROL SULFATE 90 UG/1
108 (90 BASE) INHALANT RESPIRATORY (INHALATION) EVERY 6 HOURS
Refills: 0 | Status: COMPLETED | COMMUNITY
End: 2022-10-13

## 2022-10-13 RX ORDER — FLUTICASONE PROPIONATE AND SALMETEROL 500; 50 UG/1; UG/1
500-50 POWDER RESPIRATORY (INHALATION) TWICE DAILY
Qty: 3 | Refills: 3 | Status: COMPLETED | COMMUNITY
End: 2022-10-13

## 2022-10-13 RX ORDER — DORZOLAMIDE HYDROCHLORIDE TIMOLOL MALEATE 20; 5 MG/ML; MG/ML
22.3-6.8 SOLUTION/ DROPS OPHTHALMIC
Refills: 0 | Status: ACTIVE | COMMUNITY

## 2022-10-17 ENCOUNTER — EMERGENCY (EMERGENCY)
Facility: HOSPITAL | Age: 77
LOS: 0 days | Discharge: HOME | End: 2022-10-17
Attending: EMERGENCY MEDICINE | Admitting: EMERGENCY MEDICINE

## 2022-10-17 VITALS — HEIGHT: 62 IN

## 2022-10-17 VITALS
SYSTOLIC BLOOD PRESSURE: 137 MMHG | TEMPERATURE: 97 F | RESPIRATION RATE: 18 BRPM | OXYGEN SATURATION: 97 % | DIASTOLIC BLOOD PRESSURE: 70 MMHG | HEART RATE: 73 BPM

## 2022-10-17 DIAGNOSIS — Z79.82 LONG TERM (CURRENT) USE OF ASPIRIN: ICD-10-CM

## 2022-10-17 DIAGNOSIS — Z88.0 ALLERGY STATUS TO PENICILLIN: ICD-10-CM

## 2022-10-17 DIAGNOSIS — R10.31 RIGHT LOWER QUADRANT PAIN: ICD-10-CM

## 2022-10-17 DIAGNOSIS — Z87.19 PERSONAL HISTORY OF OTHER DISEASES OF THE DIGESTIVE SYSTEM: ICD-10-CM

## 2022-10-17 DIAGNOSIS — R10.32 LEFT LOWER QUADRANT PAIN: ICD-10-CM

## 2022-10-17 DIAGNOSIS — R10.9 UNSPECIFIED ABDOMINAL PAIN: ICD-10-CM

## 2022-10-17 LAB
ALBUMIN SERPL ELPH-MCNC: 3.4 G/DL — LOW (ref 3.5–5.2)
ALP SERPL-CCNC: 75 U/L — SIGNIFICANT CHANGE UP (ref 30–115)
ALT FLD-CCNC: 14 U/L — SIGNIFICANT CHANGE UP (ref 0–41)
ANION GAP SERPL CALC-SCNC: 9 MMOL/L — SIGNIFICANT CHANGE UP (ref 7–14)
APTT BLD: 28.5 SEC — SIGNIFICANT CHANGE UP (ref 27–39.2)
AST SERPL-CCNC: 19 U/L — SIGNIFICANT CHANGE UP (ref 0–41)
BASOPHILS # BLD AUTO: 0.05 K/UL — SIGNIFICANT CHANGE UP (ref 0–0.2)
BASOPHILS NFR BLD AUTO: 0.7 % — SIGNIFICANT CHANGE UP (ref 0–1)
BILIRUB SERPL-MCNC: 0.3 MG/DL — SIGNIFICANT CHANGE UP (ref 0.2–1.2)
BUN SERPL-MCNC: 26 MG/DL — HIGH (ref 10–20)
CALCIUM SERPL-MCNC: 9.3 MG/DL — SIGNIFICANT CHANGE UP (ref 8.4–10.5)
CHLORIDE SERPL-SCNC: 106 MMOL/L — SIGNIFICANT CHANGE UP (ref 98–110)
CO2 SERPL-SCNC: 27 MMOL/L — SIGNIFICANT CHANGE UP (ref 17–32)
CREAT SERPL-MCNC: 1 MG/DL — SIGNIFICANT CHANGE UP (ref 0.7–1.5)
EGFR: 58 ML/MIN/1.73M2 — LOW
EOSINOPHIL # BLD AUTO: 0.11 K/UL — SIGNIFICANT CHANGE UP (ref 0–0.7)
EOSINOPHIL NFR BLD AUTO: 1.6 % — SIGNIFICANT CHANGE UP (ref 0–8)
GLUCOSE SERPL-MCNC: 92 MG/DL — SIGNIFICANT CHANGE UP (ref 70–99)
HCT VFR BLD CALC: 37.4 % — SIGNIFICANT CHANGE UP (ref 37–47)
HGB BLD-MCNC: 12.2 G/DL — SIGNIFICANT CHANGE UP (ref 12–16)
IMM GRANULOCYTES NFR BLD AUTO: 0.1 % — SIGNIFICANT CHANGE UP (ref 0.1–0.3)
INR BLD: 1.02 RATIO — SIGNIFICANT CHANGE UP (ref 0.65–1.3)
LIDOCAIN IGE QN: 84 U/L — HIGH (ref 7–60)
LYMPHOCYTES # BLD AUTO: 1.86 K/UL — SIGNIFICANT CHANGE UP (ref 1.2–3.4)
LYMPHOCYTES # BLD AUTO: 26.7 % — SIGNIFICANT CHANGE UP (ref 20.5–51.1)
MCHC RBC-ENTMCNC: 30.5 PG — SIGNIFICANT CHANGE UP (ref 27–31)
MCHC RBC-ENTMCNC: 32.6 G/DL — SIGNIFICANT CHANGE UP (ref 32–37)
MCV RBC AUTO: 93.5 FL — SIGNIFICANT CHANGE UP (ref 81–99)
MONOCYTES # BLD AUTO: 0.6 K/UL — SIGNIFICANT CHANGE UP (ref 0.1–0.6)
MONOCYTES NFR BLD AUTO: 8.6 % — SIGNIFICANT CHANGE UP (ref 1.7–9.3)
NEUTROPHILS # BLD AUTO: 4.33 K/UL — SIGNIFICANT CHANGE UP (ref 1.4–6.5)
NEUTROPHILS NFR BLD AUTO: 62.3 % — SIGNIFICANT CHANGE UP (ref 42.2–75.2)
NRBC # BLD: 0 /100 WBCS — SIGNIFICANT CHANGE UP (ref 0–0)
PLATELET # BLD AUTO: 262 K/UL — SIGNIFICANT CHANGE UP (ref 130–400)
POTASSIUM SERPL-MCNC: 4.7 MMOL/L — SIGNIFICANT CHANGE UP (ref 3.5–5)
POTASSIUM SERPL-SCNC: 4.7 MMOL/L — SIGNIFICANT CHANGE UP (ref 3.5–5)
PROT SERPL-MCNC: 7.1 G/DL — SIGNIFICANT CHANGE UP (ref 6–8)
PROTHROM AB SERPL-ACNC: 11.6 SEC — SIGNIFICANT CHANGE UP (ref 9.95–12.87)
RBC # BLD: 4 M/UL — LOW (ref 4.2–5.4)
RBC # FLD: 13.3 % — SIGNIFICANT CHANGE UP (ref 11.5–14.5)
SODIUM SERPL-SCNC: 142 MMOL/L — SIGNIFICANT CHANGE UP (ref 135–146)
WBC # BLD: 6.96 K/UL — SIGNIFICANT CHANGE UP (ref 4.8–10.8)
WBC # FLD AUTO: 6.96 K/UL — SIGNIFICANT CHANGE UP (ref 4.8–10.8)

## 2022-10-17 PROCEDURE — 74177 CT ABD & PELVIS W/CONTRAST: CPT | Mod: 26,MA

## 2022-10-17 PROCEDURE — 99285 EMERGENCY DEPT VISIT HI MDM: CPT

## 2022-10-17 NOTE — ED PROVIDER NOTE - OBJECTIVE STATEMENT
This is a 77-year-old female presents to the ED with abdominal pain.  Patient has a history of IBS.  Patient states that for the past week she has had intermittent abdominal pain.  Patient's son wanted to make sure that there is no infection

## 2022-10-17 NOTE — ED PROVIDER NOTE - ATTENDING APP SHARED VISIT CONTRIBUTION OF CARE
Patient has a history of IBS and frequent abdominal pain.  Today her pain was somewhat worse than usual.  Vital signs noted.  NAD.  Abdomen is nontender.  Diagnostic testing reviewed.  CT results discussed with patient with family at bedside.  No acute surgical emergency identified.  However, patient and family alerted to the presence of an ovarian cyst seen on the CAT scan.  I reviewed previous CAT scan images.  The ovarian cyst was not present at that time.  I informed the patient and family that this is a malignancy risk and that she must be seen by her gynecologist.  Copies of all diagnostic testing provided to patient to aid in follow-up.  In my opinion, outpatient management and treatment is medically appropriate.

## 2022-10-17 NOTE — ED PROVIDER NOTE - PATIENT PORTAL LINK FT
You can access the FollowMyHealth Patient Portal offered by [facility] by registering at the following website: [email or web address]. By joining [employer]’s FollowMyHealth portal, you will also be able to view your health information using other applications (apps) compatible with our system.

## 2022-10-17 NOTE — ED PROVIDER NOTE - PROGRESS NOTE DETAILS
Results discussed with patient and patient's son.  On CT patient has an ovarian cyst.  Results were discussed with patient and patient's son.  This needs further evaluation.  Will print out reports for patient to take to PMD

## 2022-10-17 NOTE — ED PROVIDER NOTE - NS ED ROS FT
Review of Systems:  	•	CONSTITUTIONAL - no fever, no diaphoresis, no chills  	•	SKIN - no rash  	•	HEMATOLOGIC - no bleeding, no bruising    	•	RESPIRATORY - no shortness of breath, no cough  	•	CARDIAC - no chest pain, no palpitations  	•	GI - abd pain, no nausea, no vomiting, no diarrhea, no constipation  	•	GENITO-URINARY - no discharge, no dysuria; no hematuria, no increased urinary frequency  	•	MUSCULOSKELETAL - no joint paint, no swelling, no redness  	•	NEUROLOGIC - no weakness, no headache, no paresthesias, no LOC  	•	PSYCH - no anxiety, non suicidal, non homicidal, no hallucination, no depression

## 2022-10-17 NOTE — ED PROVIDER NOTE - NS ED ATTENDING STATEMENT MOD
This was a shared visit with the CEM. I reviewed and verified the documentation and independently performed the documented:

## 2022-10-20 PROBLEM — I10 ESSENTIAL (PRIMARY) HYPERTENSION: Chronic | Status: ACTIVE | Noted: 2022-10-17

## 2022-10-20 PROBLEM — E78.00 PURE HYPERCHOLESTEROLEMIA, UNSPECIFIED: Chronic | Status: ACTIVE | Noted: 2022-10-17

## 2022-10-24 ENCOUNTER — APPOINTMENT (OUTPATIENT)
Dept: GASTROENTEROLOGY | Facility: CLINIC | Age: 77
End: 2022-10-24

## 2022-10-24 VITALS
DIASTOLIC BLOOD PRESSURE: 80 MMHG | BODY MASS INDEX: 19.69 KG/M2 | SYSTOLIC BLOOD PRESSURE: 120 MMHG | HEIGHT: 62 IN | WEIGHT: 107 LBS | OXYGEN SATURATION: 98 % | HEART RATE: 78 BPM

## 2022-10-24 DIAGNOSIS — Z87.19 PERSONAL HISTORY OF OTHER DISEASES OF THE DIGESTIVE SYSTEM: ICD-10-CM

## 2022-10-24 DIAGNOSIS — H40.9 UNSPECIFIED GLAUCOMA: ICD-10-CM

## 2022-10-24 DIAGNOSIS — R06.02 SHORTNESS OF BREATH: ICD-10-CM

## 2022-10-24 DIAGNOSIS — J45.901 UNSPECIFIED ASTHMA WITH (ACUTE) EXACERBATION: ICD-10-CM

## 2022-10-24 PROCEDURE — 99204 OFFICE O/P NEW MOD 45 MIN: CPT

## 2022-10-24 NOTE — PHYSICAL EXAM
[Alert] : alert [Normal Voice/Communication] : normal voice/communication [Healthy Appearing] : healthy appearing [No Acute Distress] : no acute distress [Sclera] : the sclera and conjunctiva were normal [Hearing Threshold Finger Rub Not [place]] : hearing was normal [Normal Lips/Gums] : the lips and gums were normal [Oropharynx] : the oropharynx was normal [Normal Appearance] : the appearance of the neck was normal [No Neck Mass] : no neck mass was observed [No Respiratory Distress] : no respiratory distress [No Acc Muscle Use] : no accessory muscle use [Respiration, Rhythm And Depth] : normal respiratory rhythm and effort [Auscultation Breath Sounds / Voice Sounds] : lungs were clear to auscultation bilaterally [Heart Rate And Rhythm] : heart rate was normal and rhythm regular [Normal S1, S2] : normal S1 and S2 [Murmurs] : no murmurs [Bowel Sounds] : normal bowel sounds [Abdomen Tenderness] : non-tender [No Masses] : no abdominal mass palpated [Abdomen Soft] : soft [] : no hepatosplenomegaly [Oriented To Time, Place, And Person] : oriented to person, place, and time

## 2022-10-24 NOTE — HISTORY OF PRESENT ILLNESS
[FreeTextEntry1] : 77-year-old female history of COPD TIA presumably IBS type C referred from the ER for abdominal pain.  Her pain hypogastric bilateral diffuse described as gas pain constant worse during the day improves at night allowing her to sleep.  Endorses constipation with rare visits to the toilet.  Feels full all the time which has decreased her appetite.  States lost weight denies hematochezia denies melena.  Patient had recently a TIA cardiac work-up so far negative awaiting loop recorder.  She had a visit to the ER recently a week ago for abdominal pain imaging was unremarkable blood work was also unremarkable.  Her last colonoscopy is possibly 9 years ago as per her son did not have any polyps at that time.

## 2022-10-24 NOTE — ASSESSMENT
[FreeTextEntry1] : 77-year-old female with COPD chronic pain on opiates chronic constipation possible IBS.\par \par Plan\par Discussed colonoscopy after pulmonary optimization she is she is oxygen dependent and is labored on breathing at the moment patient adamantly refuses at the moment\par I was concerned about a possible ischemic event however a lactate in the past was within normal limit.  Her exam is currently benign.  The CT was reviewed she has significant amount of stool in distal colon with mild dilation of the colonic lumen.  This could be exacerbated by opiates.\par Will start laxatives for now.\par Referred to vascular surgery to rule out ischemic pain given Hx of CVA\par She will follow-up if her symptoms do not improve

## 2022-11-08 DIAGNOSIS — R14.0 ABDOMINAL DISTENSION (GASEOUS): ICD-10-CM

## 2022-11-08 RX ORDER — PETROLATUM 41 G/100G
125 OINTMENT TOPICAL 3 TIMES DAILY
Qty: 90 | Refills: 3 | Status: ACTIVE | COMMUNITY
Start: 2022-11-08 | End: 1900-01-01

## 2022-11-10 NOTE — ASSESSMENT
[FreeTextEntry1] : ## Cryptogenic CVA\par \par - No etiology identified so far\par - Discussed need for long-term cardiac monitoring. Discussed options of MCOT+ ILR vs ILR. After discussing pros-cons, he opted for ILR, which is reasonable.\par - Neuro/cardio f-up

## 2022-11-15 ENCOUNTER — APPOINTMENT (OUTPATIENT)
Age: 77
End: 2022-11-15

## 2022-11-15 PROCEDURE — 99213 OFFICE O/P EST LOW 20 MIN: CPT | Mod: 95

## 2022-11-15 NOTE — REASON FOR VISIT
[Home] : at home, [unfilled] , at the time of the visit. [Medical Office: ([facility])___] : at the medical office located in  [Family Member] : family member [Patient] : the patient [Follow-Up] : a follow-up visit [COPD] : COPD [Emphysema] : emphysema

## 2022-11-15 NOTE — ASSESSMENT
[FreeTextEntry1] : status unchanged slow decline overall \par Assessment:\par COPD  \par bronchiectasis\par \par plan:\par Stress compliance with inhalers. Renewed today.\par cont PRN albuterol\par cont ICS/LABA\par needs PFT\par weight loss\par \par F/U 6 months\par \par \par \par \par

## 2022-11-21 ENCOUNTER — OUTPATIENT (OUTPATIENT)
Dept: OUTPATIENT SERVICES | Facility: HOSPITAL | Age: 77
LOS: 1 days | Discharge: HOME | End: 2022-11-21

## 2022-11-21 DIAGNOSIS — I69.90 UNSPECIFIED SEQUELAE OF UNSPECIFIED CEREBROVASCULAR DISEASE: ICD-10-CM

## 2022-11-21 PROCEDURE — 33285 INSJ SUBQ CAR RHYTHM MNTR: CPT

## 2022-11-21 NOTE — H&P ADULT - ASSESSMENT
77yFemale with h/o  CVA  presents for elective implantation of loop recorder    Plan:  implant the device    observe post procedure  discharge home when recovers  Remove dressing in 7 days  Do not wet site for 7 days  f/u in the office in 1month

## 2022-11-21 NOTE — H&P ADULT - HISTORY OF PRESENT ILLNESS
77 year-year old female with history of HTN, cryptogenic CVA, COPD, presents for  recommended ILR placement  Pt had MCOT for 30 days post CVA that did not revealed any arrhythmias

## 2022-11-21 NOTE — H&P ADULT - NSHPPHYSICALEXAM_GEN_ALL_CORE
PHYSICAL EXAM:    GENERAL: In no apparent distress, well nourished, and hydrated.  HEART: Regular rate and rhythm; No murmur; NO rubs, or gallops.  PULMONARY: diffuse rhonchi, labored breathing. No rales,  ABDOMEN: Soft, Nontender, Nondistended; Bowel sounds present  EXTREMITIES:  Extremities warm, pink, well-perfused, 2+ Peripheral Pulses, No clubbing, cyanosis, or edema  NEUROLOGICAL: alert & oriented x 3, no focal deficits, PERRLA, EOMI

## 2022-11-21 NOTE — PROGRESS NOTE ADULT - SUBJECTIVE AND OBJECTIVE BOX
Electrophysiology Brief Post-Op Note    I have personally seen and examined the patient.  I agree with the history and physical which I have reviewed and noted any changes below.  11-21-22 @ 16:07    PRE-OP DIAGNOSIS:  Cryptogenic CVA    POST-OP DIAGNOSIS:  Cryptogenic CVA    PROCEDURE: Loop Implnat    Physician: KRYSTLE Horowitz MD  Assistant: Joseph BEATTY    ESTIMATED BLOOD LOSS:  2    mL    ANESTHESIA TYPE:  [  ]General Anesthesia  [  ] Sedation  [X  ] Local/Regional    CONDITION  [  ] Critical  [  ] Serious  [  ]Fair  [ X ]Good    SPECIMENS REMOVED (IF APPLICABLE):  none    IMPLANTS (IF APPLICABLE)  Loop Recorder ([employer])  [id number]    FINDINGS  PLAN OF CARE  - May remove bandaid in 7 days  - May shower in 7 days  Follow up in EP office ___1 month_

## 2022-11-21 NOTE — H&P ADULT - NSICDXPASTMEDICALHX_GEN_ALL_CORE_FT
PAST MEDICAL HISTORY:  Advanced COPD     CVA (cerebrovascular accident)     High cholesterol     HTN (hypertension)

## 2022-11-22 PROBLEM — I63.9 CEREBRAL INFARCTION, UNSPECIFIED: Chronic | Status: ACTIVE | Noted: 2022-11-21

## 2022-11-22 PROBLEM — J44.9 CHRONIC OBSTRUCTIVE PULMONARY DISEASE, UNSPECIFIED: Chronic | Status: ACTIVE | Noted: 2022-11-21

## 2022-11-28 ENCOUNTER — RESULT REVIEW (OUTPATIENT)
Age: 77
End: 2022-11-28

## 2022-11-28 ENCOUNTER — APPOINTMENT (OUTPATIENT)
Age: 77
End: 2022-11-28

## 2022-11-28 ENCOUNTER — OUTPATIENT (OUTPATIENT)
Dept: OUTPATIENT SERVICES | Facility: HOSPITAL | Age: 77
LOS: 1 days | Discharge: HOME | End: 2022-11-28

## 2022-11-28 DIAGNOSIS — I10 ESSENTIAL (PRIMARY) HYPERTENSION: ICD-10-CM

## 2022-11-28 DIAGNOSIS — Z45.09 ENCOUNTER FOR ADJUSTMENT AND MANAGEMENT OF OTHER CARDIAC DEVICE: ICD-10-CM

## 2022-11-28 DIAGNOSIS — Z79.82 LONG TERM (CURRENT) USE OF ASPIRIN: ICD-10-CM

## 2022-11-28 DIAGNOSIS — J44.9 CHRONIC OBSTRUCTIVE PULMONARY DISEASE, UNSPECIFIED: ICD-10-CM

## 2022-11-28 DIAGNOSIS — Z86.73 PERSONAL HISTORY OF TRANSIENT ISCHEMIC ATTACK (TIA), AND CEREBRAL INFARCTION WITHOUT RESIDUAL DEFICITS: ICD-10-CM

## 2022-11-28 DIAGNOSIS — I63.9 CEREBRAL INFARCTION, UNSPECIFIED: ICD-10-CM

## 2022-11-28 DIAGNOSIS — E78.00 PURE HYPERCHOLESTEROLEMIA, UNSPECIFIED: ICD-10-CM

## 2022-11-28 DIAGNOSIS — Z79.899 OTHER LONG TERM (CURRENT) DRUG THERAPY: ICD-10-CM

## 2022-11-28 DIAGNOSIS — Z88.0 ALLERGY STATUS TO PENICILLIN: ICD-10-CM

## 2022-11-28 PROCEDURE — 93880 EXTRACRANIAL BILAT STUDY: CPT | Mod: 26

## 2022-12-02 ENCOUNTER — RX RENEWAL (OUTPATIENT)
Age: 77
End: 2022-12-02

## 2022-12-04 PROBLEM — R10.33 PERIUMBILICAL ABDOMINAL PAIN: Status: ACTIVE | Noted: 2022-10-24

## 2022-12-07 ENCOUNTER — APPOINTMENT (OUTPATIENT)
Dept: NEUROLOGY | Facility: CLINIC | Age: 77
End: 2022-12-07

## 2022-12-07 ENCOUNTER — APPOINTMENT (OUTPATIENT)
Dept: GERIATRICS | Facility: HOME HEALTH | Age: 77
End: 2022-12-07

## 2022-12-07 ENCOUNTER — NON-APPOINTMENT (OUTPATIENT)
Age: 77
End: 2022-12-07

## 2022-12-07 VITALS
TEMPERATURE: 98.2 F | SYSTOLIC BLOOD PRESSURE: 142 MMHG | RESPIRATION RATE: 16 BRPM | DIASTOLIC BLOOD PRESSURE: 62 MMHG | OXYGEN SATURATION: 95 % | HEART RATE: 61 BPM

## 2022-12-07 DIAGNOSIS — R10.33 PERIUMBILICAL PAIN: ICD-10-CM

## 2022-12-07 DIAGNOSIS — I67.1 CEREBRAL ANEURYSM, NONRUPTURED: ICD-10-CM

## 2022-12-07 PROCEDURE — 99349 HOME/RES VST EST MOD MDM 40: CPT

## 2022-12-07 PROCEDURE — 99443: CPT

## 2022-12-07 NOTE — HISTORY OF PRESENT ILLNESS
[FreeTextEntry1] : Patient seen and examined at home.  Patient is homebound 2/2 generalized weakness/debility and o2 dependence.  Son Jacob present during visit.  All report patient at baseline.  No acute complaints.  No CP/SOB. No abdominal complaints with good appetite and regular BM's.  No urinary complaints.  No recent falls.  No skin wounds.  s/p ED visit 10/22 2/2 abdominal pain, followed with GI nd thought to be 2/2 anxiety.  Followed with EPS/NEuro and had ILR 11/22.

## 2022-12-07 NOTE — PHYSICAL EXAM
[Alert] : alert [Sclera] : the sclera and conjunctiva were normal [Normal Outer Ear/Nose] : the ears and nose were normal in appearance [Normal Appearance] : the appearance of the neck was normal [Supple] : the neck was supple [No Respiratory Distress] : no respiratory distress [No Acc Muscle Use] : no accessory muscle use [Respiration, Rhythm And Depth] : normal respiratory rhythm and effort [Auscultation Breath Sounds / Voice Sounds] : lungs were clear to auscultation bilaterally [Normal S1, S2] : normal S1 and S2 [Heart Rate And Rhythm] : heart rate was normal and rhythm regular [Edema] : edema was not present [Abdomen Tenderness] : non-tender [Abdomen Soft] : soft [No Spinal Tenderness] : no spinal tenderness [No Clubbing, Cyanosis] : no clubbing or cyanosis of the fingernails [Involuntary Movements] : no involuntary movements were seen [Normal Color / Pigmentation] : normal skin color and pigmentation [Normal Turgor] : normal skin turgor [No Focal Deficits] : no focal deficits [Oriented To Time, Place, And Person] : oriented to person, place, and time [de-identified] : dec airflow b/l

## 2022-12-07 NOTE — ASSESSMENT
[FreeTextEntry1] : #Hx of TIA s/p TPA 6/22 (cryptogenic) s/p ILR 11/22\par - no CVA appreciated on MRI\par - c/w ASA, Statin stopped by Neuro. Will recheck Lipids 3/22, goal 40-70\par - outpatient Neuro FU as scheduled\par - ?AFIB, had MCOT, s/p ILR 11/22\par - ECHO 6/22 in note 6/22\par \par #Abdominal Pain\par - pain now resolved\par - Followed with GI, needs C-scope\par - referred to Vascular for eval; Son denies\par - c/w Bowel Regimen for constipation\par \par #COPD\par - at baseline\par - c/w LABA/ICS/LAMA\par - b2 PRN\par - o2 PRN\par - Outpatient Pulm FU as scheduled\par \par #HTN\par - c/w Lisinopril\par - DASH Diet\par \par #Vitamin Deficiency\par - c/w PO supplements\par \par #CKD 3\par - baseline Scr aprox 1.1\par - avoid Nephrotoxics\par - periodic monitoring\par \par #Anxiety\par - will try buspirone 5mg BID

## 2022-12-22 ENCOUNTER — APPOINTMENT (OUTPATIENT)
Dept: CARDIOLOGY | Facility: CLINIC | Age: 77
End: 2022-12-22

## 2022-12-22 ENCOUNTER — APPOINTMENT (OUTPATIENT)
Dept: CARDIOLOGY | Facility: CLINIC | Age: 77
End: 2022-12-22
Payer: MEDICARE

## 2022-12-22 DIAGNOSIS — Z48.89 ENCOUNTER FOR OTHER SPECIFIED SURGICAL AFTERCARE: ICD-10-CM

## 2022-12-22 DIAGNOSIS — Z45.09 ENCOUNTER FOR ADJUSTMENT AND MANAGEMENT OF OTHER CARDIAC DEVICE: ICD-10-CM

## 2022-12-22 PROCEDURE — 99212 OFFICE O/P EST SF 10 MIN: CPT | Mod: 95

## 2022-12-28 ENCOUNTER — NON-APPOINTMENT (OUTPATIENT)
Age: 77
End: 2022-12-28

## 2023-01-17 ENCOUNTER — NON-APPOINTMENT (OUTPATIENT)
Age: 78
End: 2023-01-17

## 2023-01-26 ENCOUNTER — APPOINTMENT (OUTPATIENT)
Dept: CARDIOLOGY | Facility: CLINIC | Age: 78
End: 2023-01-26
Payer: MEDICARE

## 2023-01-26 ENCOUNTER — NON-APPOINTMENT (OUTPATIENT)
Age: 78
End: 2023-01-26

## 2023-01-26 PROCEDURE — G2066: CPT

## 2023-01-26 PROCEDURE — 93298 REM INTERROG DEV EVAL SCRMS: CPT

## 2023-01-30 ENCOUNTER — NON-APPOINTMENT (OUTPATIENT)
Age: 78
End: 2023-01-30

## 2023-01-30 RX ORDER — PAROXETINE HYDROCHLORIDE 10 MG/1
10 TABLET, FILM COATED ORAL DAILY
Qty: 30 | Refills: 6 | Status: DISCONTINUED | COMMUNITY
Start: 2023-01-17 | End: 2023-01-30

## 2023-02-07 PROBLEM — Z48.89 ENCOUNTER FOR POSTOPERATIVE WOUND CHECK: Status: ACTIVE | Noted: 2023-02-07

## 2023-02-07 PROBLEM — Z45.09 ENCOUNTER FOR LOOP RECORDER CHECK: Status: ACTIVE | Noted: 2023-02-07

## 2023-02-07 NOTE — REASON FOR VISIT
[New Patient Device Check] : is here today for a new patient device check visit for [Home] : at home, [unfilled] , at the time of the visit. [Medical Office: ([facility])___] : at the medical office located in  [Patient] : the patient

## 2023-02-07 NOTE — CARDIOLOGY SUMMARY
[de-identified] : 6/23/2022:\par  1. Left ventricular ejection fraction, by visual estimation, is 55 to \par 60%.\par  2. Normal right ventricular size and function.\par  3. Normal left atrial size.\par  4. There is no evidence of pericardial effusion.\par  5. No evidence of mitral valve regurgitation.\par  6. Mild tricuspid regurgitation.\par  7. Mild aortic regurgitation.\par  8. Normal trileaflet aortic valve with normal opening.\par  [de-identified] : 11/28/2022:: No significant hemodynamic stenosis of either carotid artery.

## 2023-02-07 NOTE — ASSESSMENT
[FreeTextEntry1] : Cryptogenic CVA s/p ILR\par - Device interrogation normal. No events.\par - Wound is well healed. There are no signs or symptoms of infection of inflammation. There is no redness, no swelling, no erythema. The patient has no pain. \par - Patient is enrolled in remote monitoring services. I reviewed remote transmission process with the patient, as well as schedule and ability to do manual transmission. We also spoke about associated co-payments with remote monitoring that may not be covered by insurance. \par \par RTO in 6-12 months and PRN

## 2023-02-07 NOTE — PROCEDURE
[See Device Printout] : See device printout [de-identified] : JUAN [de-identified] : LINQ II [de-identified] : [id number] [de-identified] : 11/21/2022 [de-identified] : Good [de-identified] : No events

## 2023-02-07 NOTE — PHYSICAL EXAM
[General Appearance - Well Developed] : well developed [Normal Appearance] : normal appearance [Well Groomed] : well groomed [General Appearance - Well Nourished] : well nourished [No Deformities] : no deformities [General Appearance - In No Acute Distress] : no acute distress [Heart Rate And Rhythm] : heart rate and rhythm were normal [Heart Sounds] : normal S1 and S2 [Murmurs] : no murmurs present [] : no respiratory distress [Respiration, Rhythm And Depth] : normal respiratory rhythm and effort [Exaggerated Use Of Accessory Muscles For Inspiration] : no accessory muscle use [Clean] : clean [Dry] : dry [Well-Healed] : well-healed [FreeTextEntry1] : Parasternal [Abdomen Soft] : soft [Nail Clubbing] : no clubbing of the fingernails [Cyanosis, Localized] : no localized cyanosis

## 2023-02-07 NOTE — HISTORY OF PRESENT ILLNESS
[de-identified] : The patient is a 77 year old female with h/o HTN, cryptogenic CVA, COPD who underwent ILR placement for long-term monitoring for occult AF.\par \par She presents for wound check and device interrogation via televisit.

## 2023-02-28 ENCOUNTER — APPOINTMENT (OUTPATIENT)
Dept: CARDIOLOGY | Facility: CLINIC | Age: 78
End: 2023-02-28
Payer: MEDICARE

## 2023-02-28 ENCOUNTER — NON-APPOINTMENT (OUTPATIENT)
Age: 78
End: 2023-02-28

## 2023-02-28 PROCEDURE — G2066: CPT

## 2023-02-28 PROCEDURE — 93298 REM INTERROG DEV EVAL SCRMS: CPT

## 2023-03-07 ENCOUNTER — LABORATORY RESULT (OUTPATIENT)
Age: 78
End: 2023-03-07

## 2023-03-07 ENCOUNTER — APPOINTMENT (OUTPATIENT)
Dept: PULMONOLOGY | Facility: CLINIC | Age: 78
End: 2023-03-07
Payer: MEDICARE

## 2023-03-07 PROCEDURE — 99442: CPT | Mod: 95

## 2023-03-07 NOTE — REASON FOR VISIT
[Home] : at home, [unfilled] , at the time of the visit. [Medical Office: ([facility])___] : at the medical office located in  [Other:____] : [unfilled] [Verbal consent obtained from patient] : the patient, [unfilled] [Follow-Up] : a follow-up visit [COPD] : COPD [Bronchiectasis] : bronchiectasis

## 2023-03-07 NOTE — ASSESSMENT
[FreeTextEntry1] : status unchanged slow decline overall \par Assessment:\par COPD  \par bronchiectasis\par \par plan:\par Stress compliance with inhalers. Renewed today.\par cont PRN albuterol\par cont ICS/LABA\par needs PFT\par \par I reviewed the entire case with the patient's son who was in attendance today.\par \par F/U 6 months\par \par \par \par \par

## 2023-03-21 ENCOUNTER — APPOINTMENT (OUTPATIENT)
Dept: GERIATRICS | Facility: HOME HEALTH | Age: 78
End: 2023-03-21
Payer: MEDICARE

## 2023-03-21 VITALS
DIASTOLIC BLOOD PRESSURE: 62 MMHG | SYSTOLIC BLOOD PRESSURE: 140 MMHG | TEMPERATURE: 97.2 F | HEART RATE: 66 BPM | RESPIRATION RATE: 18 BRPM | OXYGEN SATURATION: 96 %

## 2023-03-21 PROCEDURE — 99349 HOME/RES VST EST MOD MDM 40: CPT

## 2023-03-21 RX ORDER — ESCITALOPRAM OXALATE 5 MG/1
5 TABLET ORAL DAILY
Qty: 90 | Refills: 3 | Status: DISCONTINUED | COMMUNITY
Start: 2023-01-30 | End: 2023-03-21

## 2023-03-21 NOTE — PHYSICAL EXAM
[Alert] : alert [Normal Outer Ear/Nose] : the ears and nose were normal in appearance [Sclera] : the sclera and conjunctiva were normal [Normal Appearance] : the appearance of the neck was normal [Supple] : the neck was supple [No Respiratory Distress] : no respiratory distress [No Acc Muscle Use] : no accessory muscle use [Respiration, Rhythm And Depth] : normal respiratory rhythm and effort [Auscultation Breath Sounds / Voice Sounds] : lungs were clear to auscultation bilaterally [Normal S1, S2] : normal S1 and S2 [Heart Rate And Rhythm] : heart rate was normal and rhythm regular [Edema] : edema was not present [Abdomen Tenderness] : non-tender [Abdomen Soft] : soft [No Spinal Tenderness] : no spinal tenderness [No Clubbing, Cyanosis] : no clubbing or cyanosis of the fingernails [Involuntary Movements] : no involuntary movements were seen [No Focal Deficits] : no focal deficits [Oriented To Time, Place, And Person] : oriented to person, place, and time [] : no rash [Skin Lesions] : no skin lesions [de-identified] : dec airflow b/l

## 2023-03-21 NOTE — ASSESSMENT
[FreeTextEntry1] : #Hx of TIA s/p TPA 6/22 (cryptogenic) s/p ILR 11/22\par - no CVA appreciated on MRI\par - c/w ASA, Statin stopped by Neuro. Lipids rechecked 3/23 and WNL.  Monitor off statin\par - outpatient Neuro FU as scheduled\par - ?AFIB, had MCOT, s/p ILR 11/22\par - ECHO 6/22 in note 6/22\par \par #Abdominal Pain\par - pain now resolved\par - Followed with GI, needs C-scope\par - referred to Vascular for eval; Son denies\par - c/w Bowel Regimen for constipation\par \par #COPD\par - at baseline\par - c/w LABA/ICS/LAMA\par - b2 PRN\par - o2 PRN\par - Outpatient Pulm FU as scheduled\par \par #HTN\par - c/w Lisinopril\par - DASH Diet\par \par #Vitamin Deficiency\par - c/w PO supplements\par \par #CKD 3\par - baseline Scr aprox 1.1\par - avoid Nephrotoxics\par - periodic monitoring\par \par #Anxiety\par - started on Mirtazapine by Psych NP, working well\par - c/w buspirone 10mg TID\par

## 2023-03-21 NOTE — HISTORY OF PRESENT ILLNESS
[FreeTextEntry1] : Patient seen and examined at home.  Patient is homebound 2/2 generalized weakness/debility.  Son Jacob present during visit.  All report patient at baseline.  No acute complaints.  No CP/SOB. No abdominal complaints with good appetite and regular BM's.  No urinary complaints.  No recent falls.  No skin wounds.

## 2023-03-31 ENCOUNTER — NON-APPOINTMENT (OUTPATIENT)
Age: 78
End: 2023-03-31

## 2023-03-31 ENCOUNTER — APPOINTMENT (OUTPATIENT)
Dept: CARDIOLOGY | Facility: CLINIC | Age: 78
End: 2023-03-31
Payer: MEDICARE

## 2023-03-31 PROCEDURE — G2066: CPT

## 2023-03-31 PROCEDURE — 93298 REM INTERROG DEV EVAL SCRMS: CPT

## 2023-04-03 RX ORDER — FLUTICASONE PROPIONATE AND SALMETEROL 500; 50 UG/1; UG/1
500-50 POWDER RESPIRATORY (INHALATION) TWICE DAILY
Qty: 3 | Refills: 3 | Status: COMPLETED | COMMUNITY
End: 2023-04-03

## 2023-04-03 RX ORDER — FLUTICASONE PROPIONATE AND SALMETEROL 500; 50 UG/1; UG/1
500-50 POWDER RESPIRATORY (INHALATION) TWICE DAILY
Qty: 3 | Refills: 3 | Status: COMPLETED | COMMUNITY
Start: 2023-03-07 | End: 2023-04-03

## 2023-05-04 ENCOUNTER — NON-APPOINTMENT (OUTPATIENT)
Age: 78
End: 2023-05-04

## 2023-05-04 ENCOUNTER — APPOINTMENT (OUTPATIENT)
Dept: CARDIOLOGY | Facility: CLINIC | Age: 78
End: 2023-05-04
Payer: MEDICARE

## 2023-05-04 PROCEDURE — G2066: CPT

## 2023-05-04 PROCEDURE — 93298 REM INTERROG DEV EVAL SCRMS: CPT

## 2023-06-08 ENCOUNTER — APPOINTMENT (OUTPATIENT)
Dept: CARDIOLOGY | Facility: CLINIC | Age: 78
End: 2023-06-08
Payer: MEDICARE

## 2023-06-08 ENCOUNTER — NON-APPOINTMENT (OUTPATIENT)
Age: 78
End: 2023-06-08

## 2023-06-08 PROCEDURE — 93296 REM INTERROG EVL PM/IDS: CPT

## 2023-06-08 PROCEDURE — 93294 REM INTERROG EVL PM/LDLS PM: CPT

## 2023-07-13 ENCOUNTER — NON-APPOINTMENT (OUTPATIENT)
Age: 78
End: 2023-07-13

## 2023-07-13 ENCOUNTER — APPOINTMENT (OUTPATIENT)
Dept: CARDIOLOGY | Facility: CLINIC | Age: 78
End: 2023-07-13
Payer: MEDICARE

## 2023-07-13 PROCEDURE — G2066: CPT

## 2023-07-13 PROCEDURE — 93298 REM INTERROG DEV EVAL SCRMS: CPT

## 2023-08-01 ENCOUNTER — APPOINTMENT (OUTPATIENT)
Dept: GERIATRICS | Facility: HOME HEALTH | Age: 78
End: 2023-08-01
Payer: MEDICARE

## 2023-08-01 VITALS
TEMPERATURE: 98.2 F | SYSTOLIC BLOOD PRESSURE: 118 MMHG | OXYGEN SATURATION: 92 % | RESPIRATION RATE: 16 BRPM | HEART RATE: 68 BPM | DIASTOLIC BLOOD PRESSURE: 58 MMHG

## 2023-08-01 PROCEDURE — 99348 HOME/RES VST EST LOW MDM 30: CPT

## 2023-08-01 RX ORDER — MIRTAZAPINE 7.5 MG/1
7.5 TABLET, FILM COATED ORAL
Qty: 90 | Refills: 3 | Status: ACTIVE | COMMUNITY
Start: 1900-01-01 | End: 1900-01-01

## 2023-08-01 RX ORDER — ASPIRIN 81 MG/1
81 TABLET, CHEWABLE ORAL DAILY
Qty: 90 | Refills: 3 | Status: ACTIVE | COMMUNITY
Start: 1900-01-01 | End: 1900-01-01

## 2023-08-01 RX ORDER — LISINOPRIL 10 MG/1
10 TABLET ORAL DAILY
Qty: 3 | Refills: 3 | Status: ACTIVE | COMMUNITY
Start: 1900-01-01 | End: 1900-01-01

## 2023-08-01 NOTE — PHYSICAL EXAM
[Alert] : alert [Sclera] : the sclera and conjunctiva were normal [Normal Outer Ear/Nose] : the ears and nose were normal in appearance [Normal Appearance] : the appearance of the neck was normal [Supple] : the neck was supple [No Respiratory Distress] : no respiratory distress [No Acc Muscle Use] : no accessory muscle use [Respiration, Rhythm And Depth] : normal respiratory rhythm and effort [Auscultation Breath Sounds / Voice Sounds] : lungs were clear to auscultation bilaterally [Normal S1, S2] : normal S1 and S2 [Heart Rate And Rhythm] : heart rate was normal and rhythm regular [Edema] : edema was not present [Abdomen Tenderness] : non-tender [Abdomen Soft] : soft [No Spinal Tenderness] : no spinal tenderness [No Clubbing, Cyanosis] : no clubbing or cyanosis of the fingernails [Involuntary Movements] : no involuntary movements were seen [] : no rash [Skin Lesions] : no skin lesions [No Focal Deficits] : no focal deficits [Oriented To Time, Place, And Person] : oriented to person, place, and time [de-identified] : dec airflow b/l

## 2023-08-01 NOTE — ASSESSMENT
[FreeTextEntry1] : #Hx of TIA s/p TPA 6/22 (cryptogenic) s/p ILR 11/22 - no CVA appreciated on MRI - c/w ASA, Statin stopped by Neuro. Lipids rechecked 3/23 and WNL. Monitor off statin - outpatient Neuro FU as scheduled - ?AFIB, had MCOT, s/p ILR 11/22.  Outpatient EPS FU - ECHO 6/22 in note 6/22  #Abdominal Pain 2/2 IBS? - pain now resolved - Followed with GI, needs C-scope but patient refuses.   - referred to Vascular for eval; No FU - c/w Bowel Regimen for constipation  #COPD - at baseline - c/w LABA/ICS/LAMA - b2 PRN - o2 PRN - Outpatient Pulm FU as scheduled  #HTN - c/w Lisinopril - DASH Diet  #Vitamin Deficiency - c/w PO supplements  #CKD 3 - baseline Scr aprox 1.1 - avoid Nephrotoxics - periodic monitoring  #Anxiety - c/w Mirtazapine  - c/w buspirone 10mg TID

## 2023-08-17 ENCOUNTER — APPOINTMENT (OUTPATIENT)
Dept: CARDIOLOGY | Facility: CLINIC | Age: 78
End: 2023-08-17
Payer: MEDICARE

## 2023-08-17 ENCOUNTER — NON-APPOINTMENT (OUTPATIENT)
Age: 78
End: 2023-08-17

## 2023-08-18 PROCEDURE — G2066: CPT

## 2023-08-18 PROCEDURE — 93298 REM INTERROG DEV EVAL SCRMS: CPT

## 2023-08-18 NOTE — SWALLOW BEDSIDE ASSESSMENT ADULT - SWALLOW EVAL: PATIENT/FAMILY GOALS STATEMENT
Orders for admission, patient is aware of plan and ready to go upstairs. Any questions, please call ED RN maggie at extension [phone].      Patient Covid vaccination status: Fully vaccinated     COVID Test Ordered in ED: None    COVID Suspicion at Admission: N/A    Running Infusions:  None    Mental Status/LOC at time of transport: alert As per patient and son's report, patient is at baseline

## 2023-09-09 ENCOUNTER — EMERGENCY (EMERGENCY)
Facility: HOSPITAL | Age: 78
LOS: 0 days | Discharge: HOME | End: 2023-09-09
Attending: EMERGENCY MEDICINE
Payer: MEDICARE

## 2023-09-09 VITALS
RESPIRATION RATE: 18 BRPM | OXYGEN SATURATION: 95 % | HEART RATE: 68 BPM | DIASTOLIC BLOOD PRESSURE: 64 MMHG | SYSTOLIC BLOOD PRESSURE: 120 MMHG

## 2023-09-09 VITALS
OXYGEN SATURATION: 97 % | WEIGHT: 100.09 LBS | HEART RATE: 60 BPM | DIASTOLIC BLOOD PRESSURE: 60 MMHG | SYSTOLIC BLOOD PRESSURE: 146 MMHG | HEIGHT: 62 IN | RESPIRATION RATE: 20 BRPM

## 2023-09-09 DIAGNOSIS — R10.9 UNSPECIFIED ABDOMINAL PAIN: ICD-10-CM

## 2023-09-09 DIAGNOSIS — J44.9 CHRONIC OBSTRUCTIVE PULMONARY DISEASE, UNSPECIFIED: ICD-10-CM

## 2023-09-09 DIAGNOSIS — R19.7 DIARRHEA, UNSPECIFIED: ICD-10-CM

## 2023-09-09 DIAGNOSIS — Z88.0 ALLERGY STATUS TO PENICILLIN: ICD-10-CM

## 2023-09-09 DIAGNOSIS — I10 ESSENTIAL (PRIMARY) HYPERTENSION: ICD-10-CM

## 2023-09-09 DIAGNOSIS — Z86.73 PERSONAL HISTORY OF TRANSIENT ISCHEMIC ATTACK (TIA), AND CEREBRAL INFARCTION WITHOUT RESIDUAL DEFICITS: ICD-10-CM

## 2023-09-09 DIAGNOSIS — E78.00 PURE HYPERCHOLESTEROLEMIA, UNSPECIFIED: ICD-10-CM

## 2023-09-09 DIAGNOSIS — Z79.82 LONG TERM (CURRENT) USE OF ASPIRIN: ICD-10-CM

## 2023-09-09 DIAGNOSIS — K52.9 NONINFECTIVE GASTROENTERITIS AND COLITIS, UNSPECIFIED: ICD-10-CM

## 2023-09-09 LAB
ALBUMIN SERPL ELPH-MCNC: 3.4 G/DL — LOW (ref 3.5–5.2)
ALP SERPL-CCNC: 89 U/L — SIGNIFICANT CHANGE UP (ref 30–115)
ALT FLD-CCNC: 14 U/L — SIGNIFICANT CHANGE UP (ref 0–41)
ANION GAP SERPL CALC-SCNC: 10 MMOL/L — SIGNIFICANT CHANGE UP (ref 7–14)
AST SERPL-CCNC: 19 U/L — SIGNIFICANT CHANGE UP (ref 0–41)
BASOPHILS # BLD AUTO: 0.05 K/UL — SIGNIFICANT CHANGE UP (ref 0–0.2)
BASOPHILS NFR BLD AUTO: 0.5 % — SIGNIFICANT CHANGE UP (ref 0–1)
BILIRUB SERPL-MCNC: 0.3 MG/DL — SIGNIFICANT CHANGE UP (ref 0.2–1.2)
BUN SERPL-MCNC: 24 MG/DL — HIGH (ref 10–20)
CALCIUM SERPL-MCNC: 9 MG/DL — SIGNIFICANT CHANGE UP (ref 8.4–10.5)
CHLORIDE SERPL-SCNC: 102 MMOL/L — SIGNIFICANT CHANGE UP (ref 98–110)
CO2 SERPL-SCNC: 26 MMOL/L — SIGNIFICANT CHANGE UP (ref 17–32)
CREAT SERPL-MCNC: 1.1 MG/DL — SIGNIFICANT CHANGE UP (ref 0.7–1.5)
EGFR: 51 ML/MIN/1.73M2 — LOW
EOSINOPHIL # BLD AUTO: 0.15 K/UL — SIGNIFICANT CHANGE UP (ref 0–0.7)
EOSINOPHIL NFR BLD AUTO: 1.6 % — SIGNIFICANT CHANGE UP (ref 0–8)
GLUCOSE SERPL-MCNC: 94 MG/DL — SIGNIFICANT CHANGE UP (ref 70–99)
HCT VFR BLD CALC: 36.7 % — LOW (ref 37–47)
HGB BLD-MCNC: 11.7 G/DL — LOW (ref 12–16)
IMM GRANULOCYTES NFR BLD AUTO: 0.2 % — SIGNIFICANT CHANGE UP (ref 0.1–0.3)
LIDOCAIN IGE QN: 79 U/L — HIGH (ref 7–60)
LYMPHOCYTES # BLD AUTO: 1.89 K/UL — SIGNIFICANT CHANGE UP (ref 1.2–3.4)
LYMPHOCYTES # BLD AUTO: 20.5 % — SIGNIFICANT CHANGE UP (ref 20.5–51.1)
MCHC RBC-ENTMCNC: 29.8 PG — SIGNIFICANT CHANGE UP (ref 27–31)
MCHC RBC-ENTMCNC: 31.9 G/DL — LOW (ref 32–37)
MCV RBC AUTO: 93.4 FL — SIGNIFICANT CHANGE UP (ref 81–99)
MONOCYTES # BLD AUTO: 0.64 K/UL — HIGH (ref 0.1–0.6)
MONOCYTES NFR BLD AUTO: 6.9 % — SIGNIFICANT CHANGE UP (ref 1.7–9.3)
NEUTROPHILS # BLD AUTO: 6.49 K/UL — SIGNIFICANT CHANGE UP (ref 1.4–6.5)
NEUTROPHILS NFR BLD AUTO: 70.3 % — SIGNIFICANT CHANGE UP (ref 42.2–75.2)
NRBC # BLD: 0 /100 WBCS — SIGNIFICANT CHANGE UP (ref 0–0)
PLATELET # BLD AUTO: 278 K/UL — SIGNIFICANT CHANGE UP (ref 130–400)
PMV BLD: 9.2 FL — SIGNIFICANT CHANGE UP (ref 7.4–10.4)
POTASSIUM SERPL-MCNC: 4.2 MMOL/L — SIGNIFICANT CHANGE UP (ref 3.5–5)
POTASSIUM SERPL-SCNC: 4.2 MMOL/L — SIGNIFICANT CHANGE UP (ref 3.5–5)
PROT SERPL-MCNC: 7.1 G/DL — SIGNIFICANT CHANGE UP (ref 6–8)
RBC # BLD: 3.93 M/UL — LOW (ref 4.2–5.4)
RBC # FLD: 13.2 % — SIGNIFICANT CHANGE UP (ref 11.5–14.5)
SODIUM SERPL-SCNC: 138 MMOL/L — SIGNIFICANT CHANGE UP (ref 135–146)
WBC # BLD: 9.24 K/UL — SIGNIFICANT CHANGE UP (ref 4.8–10.8)
WBC # FLD AUTO: 9.24 K/UL — SIGNIFICANT CHANGE UP (ref 4.8–10.8)

## 2023-09-09 PROCEDURE — 96375 TX/PRO/DX INJ NEW DRUG ADDON: CPT

## 2023-09-09 PROCEDURE — 85025 COMPLETE CBC W/AUTO DIFF WBC: CPT

## 2023-09-09 PROCEDURE — 99284 EMERGENCY DEPT VISIT MOD MDM: CPT | Mod: 25

## 2023-09-09 PROCEDURE — 80053 COMPREHEN METABOLIC PANEL: CPT

## 2023-09-09 PROCEDURE — 36415 COLL VENOUS BLD VENIPUNCTURE: CPT

## 2023-09-09 PROCEDURE — 74177 CT ABD & PELVIS W/CONTRAST: CPT | Mod: MA

## 2023-09-09 PROCEDURE — 83690 ASSAY OF LIPASE: CPT

## 2023-09-09 PROCEDURE — 96374 THER/PROPH/DIAG INJ IV PUSH: CPT | Mod: XU

## 2023-09-09 PROCEDURE — 74177 CT ABD & PELVIS W/CONTRAST: CPT | Mod: 26,MA

## 2023-09-09 PROCEDURE — 99285 EMERGENCY DEPT VISIT HI MDM: CPT

## 2023-09-09 RX ORDER — SODIUM CHLORIDE 9 MG/ML
500 INJECTION INTRAMUSCULAR; INTRAVENOUS; SUBCUTANEOUS ONCE
Refills: 0 | Status: COMPLETED | OUTPATIENT
Start: 2023-09-09 | End: 2023-09-09

## 2023-09-09 RX ORDER — METRONIDAZOLE 500 MG
500 TABLET ORAL ONCE
Refills: 0 | Status: COMPLETED | OUTPATIENT
Start: 2023-09-09 | End: 2023-09-09

## 2023-09-09 RX ORDER — CIPROFLOXACIN LACTATE 400MG/40ML
1 VIAL (ML) INTRAVENOUS
Qty: 20 | Refills: 0
Start: 2023-09-09 | End: 2023-09-18

## 2023-09-09 RX ORDER — MORPHINE SULFATE 50 MG/1
2 CAPSULE, EXTENDED RELEASE ORAL ONCE
Refills: 0 | Status: DISCONTINUED | OUTPATIENT
Start: 2023-09-09 | End: 2023-09-09

## 2023-09-09 RX ORDER — METRONIDAZOLE 500 MG
1 TABLET ORAL
Qty: 30 | Refills: 0
Start: 2023-09-09 | End: 2023-09-18

## 2023-09-09 RX ORDER — SODIUM CHLORIDE 9 MG/ML
1000 INJECTION, SOLUTION INTRAVENOUS ONCE
Refills: 0 | Status: DISCONTINUED | OUTPATIENT
Start: 2023-09-09 | End: 2023-09-09

## 2023-09-09 RX ORDER — CIPROFLOXACIN LACTATE 400MG/40ML
400 VIAL (ML) INTRAVENOUS ONCE
Refills: 0 | Status: COMPLETED | OUTPATIENT
Start: 2023-09-09 | End: 2023-09-09

## 2023-09-09 RX ORDER — SODIUM CHLORIDE 9 MG/ML
1000 INJECTION, SOLUTION INTRAVENOUS ONCE
Refills: 0 | Status: COMPLETED | OUTPATIENT
Start: 2023-09-09 | End: 2023-09-09

## 2023-09-09 RX ADMIN — Medication 100 MILLIGRAM(S): at 13:52

## 2023-09-09 RX ADMIN — MORPHINE SULFATE 2 MILLIGRAM(S): 50 CAPSULE, EXTENDED RELEASE ORAL at 09:34

## 2023-09-09 RX ADMIN — SODIUM CHLORIDE 1000 MILLILITER(S): 9 INJECTION, SOLUTION INTRAVENOUS at 09:34

## 2023-09-09 RX ADMIN — Medication 200 MILLIGRAM(S): at 14:43

## 2023-09-09 RX ADMIN — SODIUM CHLORIDE 500 MILLILITER(S): 9 INJECTION INTRAMUSCULAR; INTRAVENOUS; SUBCUTANEOUS at 13:52

## 2023-09-09 NOTE — ED ADULT TRIAGE NOTE - WEIGHT IN KG
WatchPat error, Unable to upload pt's information to device.       Called [employer] and they will overnight a new device. Will need to reschedule pt        
45.4

## 2023-09-09 NOTE — ED PROVIDER NOTE - CLINICAL SUMMARY MEDICAL DECISION MAKING FREE TEXT BOX
Pt presents with diarrhea, no fever and no abdominal pain. CT scan is positive for colitis. Pt states she gets nausea when she drinks liquids. Pt advised to stay, however is resistance. High chance of returning due to poor po intake. Discussed with pt and family. Will start antibiotics, continue hydration and encourage admission.

## 2023-09-09 NOTE — ED ADULT NURSE NOTE - NSFALLRISKINTERV_ED_ALL_ED

## 2023-09-09 NOTE — ED PROVIDER NOTE - ATTENDING APP SHARED VISIT CONTRIBUTION OF CARE
Pt has a history of IBS. Since early this am pt developed diarrhea. No abdominal pain. No recent antibiotics. On exam s1S2 rrr, no murmur, lungs clear, abdomen is soft nontender, neuro intact.

## 2023-09-09 NOTE — ED PROVIDER NOTE - PROGRESS NOTE DETAILS
shared decision making with pt and family as pt adamant about not staying in hospital. concerned about acquiring new infections/problems with hospital exposure, feeling much better and tolerating po. promises to stay hydrated. sts she has taken these abx po before with improvement. risk of return to ED needing admission d/w pt and fam. strict return prec advised and all ?s ans tpc with pts son johnathan with permission from pt. updated him on all results and pts improved status

## 2023-09-09 NOTE — ED PROVIDER NOTE - OBJECTIVE STATEMENT
pt with pmhx CVA, COPD, IBS presents to ED c/o abd pain since last night with mult episodes of watery diarrhea today. pain is sharp, nonradiating, moderate. denies exacerbating or relieving factors. Denies fever/chill/HA/dizziness/chest pain/palpitation/sob/black stool/bloody stool/urinary sxs

## 2023-09-18 ENCOUNTER — APPOINTMENT (OUTPATIENT)
Dept: PULMONOLOGY | Facility: CLINIC | Age: 78
End: 2023-09-18
Payer: MEDICARE

## 2023-09-18 DIAGNOSIS — J44.9 CHRONIC OBSTRUCTIVE PULMONARY DISEASE, UNSPECIFIED: ICD-10-CM

## 2023-09-18 PROCEDURE — 99213 OFFICE O/P EST LOW 20 MIN: CPT | Mod: 95

## 2023-09-18 RX ORDER — ALBUTEROL SULFATE 2.5 MG/3ML
(2.5 MG/3ML) SOLUTION RESPIRATORY (INHALATION) 4 TIMES DAILY
Qty: 5 | Refills: 3 | Status: ACTIVE | COMMUNITY
Start: 2022-07-26 | End: 1900-01-01

## 2023-09-21 ENCOUNTER — NON-APPOINTMENT (OUTPATIENT)
Age: 78
End: 2023-09-21

## 2023-09-21 ENCOUNTER — APPOINTMENT (OUTPATIENT)
Dept: CARDIOLOGY | Facility: CLINIC | Age: 78
End: 2023-09-21
Payer: MEDICARE

## 2023-09-22 PROCEDURE — G2066: CPT

## 2023-09-22 PROCEDURE — 93298 REM INTERROG DEV EVAL SCRMS: CPT

## 2023-10-26 ENCOUNTER — NON-APPOINTMENT (OUTPATIENT)
Age: 78
End: 2023-10-26

## 2023-10-26 ENCOUNTER — APPOINTMENT (OUTPATIENT)
Dept: CARDIOLOGY | Facility: CLINIC | Age: 78
End: 2023-10-26
Payer: MEDICARE

## 2023-10-27 PROCEDURE — G2066: CPT

## 2023-10-27 PROCEDURE — 93298 REM INTERROG DEV EVAL SCRMS: CPT

## 2023-11-20 ENCOUNTER — RX RENEWAL (OUTPATIENT)
Age: 78
End: 2023-11-20

## 2023-11-28 ENCOUNTER — APPOINTMENT (OUTPATIENT)
Dept: GERIATRICS | Facility: HOME HEALTH | Age: 78
End: 2023-11-28
Payer: MEDICARE

## 2023-11-28 VITALS
OXYGEN SATURATION: 88 % | DIASTOLIC BLOOD PRESSURE: 62 MMHG | SYSTOLIC BLOOD PRESSURE: 132 MMHG | RESPIRATION RATE: 18 BRPM | TEMPERATURE: 97.2 F | HEART RATE: 76 BPM

## 2023-11-28 PROCEDURE — 99349 HOME/RES VST EST MOD MDM 40: CPT

## 2023-11-28 RX ORDER — ALENDRONATE SODIUM 70 MG/1
70 TABLET ORAL
Qty: 120 | Refills: 2 | Status: DISCONTINUED | COMMUNITY
Start: 2022-10-09 | End: 2023-11-28

## 2023-11-29 ENCOUNTER — NON-APPOINTMENT (OUTPATIENT)
Age: 78
End: 2023-11-29

## 2023-11-29 ENCOUNTER — APPOINTMENT (OUTPATIENT)
Dept: CARDIOLOGY | Facility: CLINIC | Age: 78
End: 2023-11-29
Payer: MEDICARE

## 2023-11-30 PROCEDURE — 93298 REM INTERROG DEV EVAL SCRMS: CPT | Mod: NC

## 2023-11-30 PROCEDURE — G2066: CPT | Mod: NC

## 2024-01-01 ENCOUNTER — EMERGENCY (EMERGENCY)
Facility: HOSPITAL | Age: 79
LOS: 0 days | End: 2024-07-28
Attending: EMERGENCY MEDICINE
Payer: MEDICARE

## 2024-01-01 VITALS
TEMPERATURE: 98 F | HEART RATE: 58 BPM | SYSTOLIC BLOOD PRESSURE: 52 MMHG | DIASTOLIC BLOOD PRESSURE: 29 MMHG | OXYGEN SATURATION: 57 % | RESPIRATION RATE: 8 BRPM

## 2024-01-01 DIAGNOSIS — Z88.0 ALLERGY STATUS TO PENICILLIN: ICD-10-CM

## 2024-01-01 DIAGNOSIS — R00.1 BRADYCARDIA, UNSPECIFIED: ICD-10-CM

## 2024-01-01 DIAGNOSIS — J96.01 ACUTE RESPIRATORY FAILURE WITH HYPOXIA: ICD-10-CM

## 2024-01-01 DIAGNOSIS — Z95.818 PRESENCE OF OTHER CARDIAC IMPLANTS AND GRAFTS: Chronic | ICD-10-CM

## 2024-01-01 DIAGNOSIS — J44.89 OTHER SPECIFIED CHRONIC OBSTRUCTIVE PULMONARY DISEASE: ICD-10-CM

## 2024-01-01 DIAGNOSIS — Z66 DO NOT RESUSCITATE: ICD-10-CM

## 2024-01-01 PROCEDURE — 99285 EMERGENCY DEPT VISIT HI MDM: CPT

## 2024-01-01 PROCEDURE — 99283 EMERGENCY DEPT VISIT LOW MDM: CPT | Mod: 25

## 2024-01-01 PROCEDURE — 96372 THER/PROPH/DIAG INJ SC/IM: CPT

## 2024-01-01 PROCEDURE — 82962 GLUCOSE BLOOD TEST: CPT

## 2024-01-01 PROCEDURE — 36000 PLACE NEEDLE IN VEIN: CPT

## 2024-01-01 PROCEDURE — 93010 ELECTROCARDIOGRAM REPORT: CPT

## 2024-01-01 PROCEDURE — 99285 EMERGENCY DEPT VISIT HI MDM: CPT | Mod: 25

## 2024-01-01 PROCEDURE — 93005 ELECTROCARDIOGRAM TRACING: CPT

## 2024-01-01 RX ORDER — ATROPINE SULFATE 0.4 MG/ML
1 INJECTION, SOLUTION ENDOTRACHEAL; INTRAMEDULLARY; INTRAMUSCULAR; INTRAVENOUS; SUBCUTANEOUS ONCE
Refills: 0 | Status: COMPLETED | OUTPATIENT
Start: 2024-01-01 | End: 2024-01-01

## 2024-01-01 RX ORDER — SODIUM CHLORIDE 0.9 % (FLUSH) 0.9 %
1000 SYRINGE (ML) INJECTION ONCE
Refills: 0 | Status: COMPLETED | OUTPATIENT
Start: 2024-01-01 | End: 2024-01-01

## 2024-01-01 RX ORDER — NOREPINEPHRINE BITARTRATE 1 MG/ML
0.05 INJECTION INTRAVENOUS
Qty: 8 | Refills: 0 | Status: DISCONTINUED | OUTPATIENT
Start: 2024-01-01 | End: 2024-01-01

## 2024-01-01 RX ADMIN — Medication 2000 MILLILITER(S): at 11:56

## 2024-01-01 RX ADMIN — NOREPINEPHRINE BITARTRATE 4.69 MICROGRAM(S)/KG/MIN: 1 INJECTION INTRAVENOUS at 12:05

## 2024-01-01 RX ADMIN — ATROPINE SULFATE 1 MILLIGRAM(S): 0.4 INJECTION, SOLUTION ENDOTRACHEAL; INTRAMEDULLARY; INTRAMUSCULAR; INTRAVENOUS; SUBCUTANEOUS at 12:05

## 2024-01-02 ENCOUNTER — NON-APPOINTMENT (OUTPATIENT)
Age: 79
End: 2024-01-02

## 2024-01-02 ENCOUNTER — APPOINTMENT (OUTPATIENT)
Dept: CARDIOLOGY | Facility: CLINIC | Age: 79
End: 2024-01-02
Payer: MEDICARE

## 2024-01-02 RX ORDER — PREDNISONE 10 MG/1
10 TABLET ORAL
Qty: 60 | Refills: 0 | Status: ACTIVE | COMMUNITY
Start: 2024-01-02 | End: 1900-01-01

## 2024-01-03 PROCEDURE — 93298 REM INTERROG DEV EVAL SCRMS: CPT

## 2024-01-22 ENCOUNTER — APPOINTMENT (OUTPATIENT)
Dept: PULMONOLOGY | Facility: CLINIC | Age: 79
End: 2024-01-22
Payer: MEDICARE

## 2024-01-22 DIAGNOSIS — J44.1 CHRONIC OBSTRUCTIVE PULMONARY DISEASE WITH (ACUTE) EXACERBATION: ICD-10-CM

## 2024-01-22 DIAGNOSIS — J47.1 BRONCHIECTASIS WITH (ACUTE) EXACERBATION: ICD-10-CM

## 2024-01-22 PROCEDURE — 99213 OFFICE O/P EST LOW 20 MIN: CPT | Mod: 95

## 2024-01-22 RX ORDER — MONTELUKAST 10 MG/1
10 TABLET, FILM COATED ORAL
Qty: 90 | Refills: 3 | Status: ACTIVE | COMMUNITY
Start: 1900-01-01 | End: 1900-01-01

## 2024-01-22 RX ORDER — TIOTROPIUM BROMIDE 18 UG/1
18 CAPSULE ORAL; RESPIRATORY (INHALATION)
Qty: 90 | Refills: 3 | Status: ACTIVE | COMMUNITY
Start: 2022-12-02 | End: 1900-01-01

## 2024-01-22 NOTE — ASSESSMENT
[FreeTextEntry1] : Bronchiectasis (494.0) (J47.9) Chronic asthmatic bronchitis (493.20) (J44.9) Breathing status unchanged slow decline overall Assessment: COPD bronchiectasis  plan: Stress compliance with inhalers. Renewed today. cont PRN albuterol cont ICS/LABA needs PFT She is to continue her antibiotics as directed by her other physicians who are taking care of of her colitis I reviewed the entire case with the patient's son who was in attendance today.  F/U 6 months.

## 2024-01-22 NOTE — REASON FOR VISIT
[Home] : at home, [unfilled] , at the time of the visit. [Medical Office: ([facility])___] : at the medical office located in  [Patient] : the patient [Self] : self [Follow-Up] : a follow-up visit [Bronchiectasis] : bronchiectasis [Shortness of Breath] : shortness of breath [Other:____] : [unfilled] [TextBox_44] : Not doing well in general.  More short of breath than usual.  She is not ill such as with an infection.  No sputum production or fevers.

## 2024-02-05 ENCOUNTER — NON-APPOINTMENT (OUTPATIENT)
Age: 79
End: 2024-02-05

## 2024-02-05 ENCOUNTER — APPOINTMENT (OUTPATIENT)
Dept: CARDIOLOGY | Facility: CLINIC | Age: 79
End: 2024-02-05
Payer: MEDICARE

## 2024-02-06 PROCEDURE — 93298 REM INTERROG DEV EVAL SCRMS: CPT

## 2024-02-07 ENCOUNTER — APPOINTMENT (OUTPATIENT)
Dept: GERIATRICS | Facility: HOME HEALTH | Age: 79
End: 2024-02-07
Payer: MEDICARE

## 2024-02-07 ENCOUNTER — NON-APPOINTMENT (OUTPATIENT)
Age: 79
End: 2024-02-07

## 2024-02-07 PROCEDURE — 99441: CPT

## 2024-02-07 RX ORDER — BUSPIRONE HYDROCHLORIDE 10 MG/1
10 TABLET ORAL
Qty: 360 | Refills: 3 | Status: ACTIVE | COMMUNITY
Start: 2022-12-07 | End: 1900-01-01

## 2024-02-21 RX ORDER — PREDNISONE 10 MG/1
10 TABLET ORAL
Qty: 60 | Refills: 1 | Status: COMPLETED | COMMUNITY
Start: 2024-01-22 | End: 2024-02-21

## 2024-03-11 ENCOUNTER — APPOINTMENT (OUTPATIENT)
Dept: CARDIOLOGY | Facility: CLINIC | Age: 79
End: 2024-03-11
Payer: MEDICARE

## 2024-03-11 ENCOUNTER — NON-APPOINTMENT (OUTPATIENT)
Age: 79
End: 2024-03-11

## 2024-03-11 PROCEDURE — 93298 REM INTERROG DEV EVAL SCRMS: CPT

## 2024-03-19 RX ORDER — FLUTICASONE PROPIONATE AND SALMETEROL 50; 500 UG/1; UG/1
500-50 POWDER RESPIRATORY (INHALATION) TWICE DAILY
Qty: 3 | Refills: 3 | Status: ACTIVE | COMMUNITY
Start: 1900-01-01 | End: 1900-01-01

## 2024-04-15 ENCOUNTER — NON-APPOINTMENT (OUTPATIENT)
Age: 79
End: 2024-04-15

## 2024-04-15 ENCOUNTER — APPOINTMENT (OUTPATIENT)
Dept: CARDIOLOGY | Facility: CLINIC | Age: 79
End: 2024-04-15
Payer: MEDICARE

## 2024-04-15 PROCEDURE — 93298 REM INTERROG DEV EVAL SCRMS: CPT

## 2024-04-17 PROBLEM — Z86.73 HISTORY OF TIA (TRANSIENT ISCHEMIC ATTACK): Status: ACTIVE | Noted: 2022-06-27

## 2024-04-19 RX ORDER — PREDNISONE 10 MG/1
10 TABLET ORAL
Qty: 120 | Refills: 4 | Status: ACTIVE | COMMUNITY
Start: 2024-01-29 | End: 1900-01-01

## 2024-04-21 NOTE — ED ADULT NURSE NOTE - CAS ELECT INFOMATION PROVIDED
"RAPID RESPONSE NURSE PROACTIVE ROUNDING NOTE     Time of Visit: 820    Admit Date: 2024  LOS: 0  Code Status: DNR   Date of Visit: 2024  : 1940  Age: 83 y.o.  Sex: female  Race: White  Bed: K481/K481 A:   MRN: [id number]  Was the patient discharged from an ICU this admission? No   Was the patient discharged from a PACU within last 24 hours?  No  Did the patient receive conscious sedation/general anesthesia in last 24 hours?  No  Was the patient in the ED within the past 24 hours?  Yes  Was the patient started on NIPPV within the past 24 hours?  No  Attending Physician: Geri Beltrán MD  Primary Service: Networked reference to record PCT     ASSESSMENT     Notified by  Follow up .  Reason for alert: Patient agitated overnight, AMS    Diagnosis: Sepsis    Abnormal Vital Signs: BP (!) 126/52 (BP Location: Right leg, Patient Position: Lying)   Pulse 63   Temp 98.4 °F (36.9 °C) (Oral)   Resp 18   Ht 5' 3" (1.6 m)   Wt 55.3 kg (122 lb)   LMP  (LMP Unknown)   SpO2 100%   BMI 21.61 kg/m²      Clinical Issues: Neuro    Patient  has a past medical history of Acute encephalopathy, Acute hypoxemic respiratory failure, Acute right-sided thoracic back pain, Allergy, Asthma, Basal cell carcinoma, Basal cell carcinoma, Basal cell carcinoma, Basal cell carcinoma, Basal cell carcinoma, BCC (basal cell carcinoma of skin), Bilateral pleural effusion, Bilateral renal cysts, Breast cancer, CAD (coronary artery disease), Cardiomyopathy, Cardiomyopathy, ischemic, Cataract, Chest pain, CHF (congestive heart failure), CKD (chronic kidney disease) stage 4, GFR 15-29 ml/min, Colon polyp, 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, with long-term current use of insulin, COPD (chronic obstructive pulmonary disease), COPD exacerbation, Current mild episode of major depressive disorder without prior episode, Defibrillator discharge, Dependence on " renal dialysis, Diabetes mellitus, Diabetes mellitus type II, Diabetes with neurologic complications, Edema, ESRD needing dialysis, Goals of care, counseling/discussion, Goiter, Hematuria, unspecified, breast cancer, Hyperlipidemia, Hypertension, Hypocalcemia, Hypokalemia, Hyponatremia, Hyponatremia, Iron deficiency anemia, Iron deficiency anemia, Iron deficiency anemia, Left kidney mass, Meningioma, Microalbuminuria due to type 2 diabetes mellitus, Osteoporosis, postmenopausal, Pneumonia, Postinflammatory pulmonary fibrosis, Proteinuria, Pseudomonas pneumonia, SCC (squamous cell carcinoma), Skin cancer, Sleep apnea, Squamous cell carcinoma, Unspecified vitamin D deficiency, UTI (urinary tract infection), Ventricular tachycardia, Vitamin B12 deficiency, and Vitamin D deficiency disease.      Patient resting in bed with bilateral wrist restraints, received PRN ativan this morning, Anna MEDINA and Uday MARIO at the bedside speaking with family plan of care and possibly transitioning to comfort care/ hospice. Family discussion will take place at 10 am when family arrives after Mosque. Will continue to monitor and assist as needed.     INTERVENTIONS/ RECOMMENDATIONS     Contact team if patient becomes agitated again for further comfort medicaions    Discussed plan of care with RN, Uday NP and Anna MEDINA.    PHYSICIAN ESCALATION     Yes/No  No    Orders received and case discussed with  Ramona Henriquez NP.    Disposition: Remain in room 481.    FOLLOW-UP     Call back the Rapid Response Nurse, Delmy Kirk RN at [phone] for additional questions or concerns.          DC instructions

## 2024-04-24 ENCOUNTER — APPOINTMENT (OUTPATIENT)
Dept: GERIATRICS | Facility: HOME HEALTH | Age: 79
End: 2024-04-24
Payer: MEDICARE

## 2024-04-24 VITALS
DIASTOLIC BLOOD PRESSURE: 78 MMHG | RESPIRATION RATE: 16 BRPM | SYSTOLIC BLOOD PRESSURE: 152 MMHG | TEMPERATURE: 98.2 F | HEART RATE: 80 BPM | OXYGEN SATURATION: 98 %

## 2024-04-24 DIAGNOSIS — Z86.73 PERSONAL HISTORY OF TRANSIENT ISCHEMIC ATTACK (TIA), AND CEREBRAL INFARCTION W/OUT RESIDUAL DEFICITS: ICD-10-CM

## 2024-04-24 DIAGNOSIS — L89.90 PRESSURE ULCER OF UNSPECIFIED SITE, UNSPECIFIED STAGE: ICD-10-CM

## 2024-04-24 PROCEDURE — 99349 HOME/RES VST EST MOD MDM 40: CPT

## 2024-04-24 NOTE — HISTORY OF PRESENT ILLNESS
Wears compression stockings [FreeTextEntry1] : Patient seen and examined at home.  Patient is homebound 2/2 generalized weakness/debility.  Son Jacob present during visit.  Reporting patient with worsening SOB, started on Prednisone 40mg PO QD, has been on since 1/24. Reports Pulm has tried to taper but patient develops acute SOB.  Next tele visit 4/30.  Otherwise patient at baseline.  No acute complaints.  No CP/SOB. No abdominal complaints with good appetite and regular BM's.  No urinary complaints.  Last labs 3/23.  Patient refusing labs at this time.  Also reported treated for UTi with [employer],  got abx x 3, UCx negative.  No recent falls.  Reporting PU x 1 month.  Being treated locally.  Reports lesion getting smaller per son.

## 2024-04-24 NOTE — ASSESSMENT
[FreeTextEntry1] : #Hx of TIA s/p TPA 6/22 (cryptogenic) s/p ILR 11/22 - no CVA appreciated on MRI - c/w ASA, Statin stopped by Neuro. Lipids rechecked 3/23 and WNL. Monitor off statin - outpatient Neuro FU as scheduled - ?AFIB, had MCOT, s/p ILR 11/22. Outpatient EPS FU - ECHO 6/22 in note 6/22  #Hx of IBS - in ED 9/9/23, pancolitis.   - pain now resolved - Followed with GI, needs C-scope but patient refuses. - c/w Bowel Regimen for constipation PRN  #COPD - has been on Pred 40mg QD since 1/24.  Following with Pulm next week - c/w LABA/ICS/LAMA - b2 PRN - o2 PRN - Outpatient Pulm FU as scheduled  #HTN - c/w Lisinopril - DASH Diet  #Vitamin Deficiency - c/w PO supplements  #CKD 3 - baseline Scr aprox 1.1 - avoid Nephrotoxics - periodic monitoring, refusing labs at this time  #Anxiety - c/w Mirtazapine - c/w buspirone 20mg qAM, 10mg at 1300, and 10mg at 1900. - outpatient Psych FU  #Sacral Stage 2 PU x 1 month - scab formation vs eschar noted - local wound care - pressure off-loading discussed at length - VNS eval for wound care  #Generalized Weakness #Gait Instability - DME for ambulation - pain control - fall precautions - c/w PT/OT

## 2024-04-24 NOTE — PHYSICAL EXAM
[Alert] : alert [Sclera] : the sclera and conjunctiva were normal [Normal Outer Ear/Nose] : the ears and nose were normal in appearance [Normal Appearance] : the appearance of the neck was normal [Supple] : the neck was supple [No Respiratory Distress] : no respiratory distress [No Acc Muscle Use] : no accessory muscle use [Respiration, Rhythm And Depth] : normal respiratory rhythm and effort [Auscultation Breath Sounds / Voice Sounds] : lungs were clear to auscultation bilaterally [Normal S1, S2] : normal S1 and S2 [Heart Rate And Rhythm] : heart rate was normal and rhythm regular [Edema] : edema was not present [Abdomen Tenderness] : non-tender [Abdomen Soft] : soft [No Spinal Tenderness] : no spinal tenderness [No Clubbing, Cyanosis] : no clubbing or cyanosis of the fingernails [Involuntary Movements] : no involuntary movements were seen [] : no rash [Skin Lesions] : no skin lesions [No Focal Deficits] : no focal deficits [Oriented To Time, Place, And Person] : oriented to person, place, and time [de-identified] : dec airflow b/l [de-identified] : approx 3x1 Stage 2 PU to sacral area.  ?Scab vs eschar noted at approx 10-11 o'clock.  No discharge.  Slight surrounding erythema noted.

## 2024-04-30 ENCOUNTER — APPOINTMENT (OUTPATIENT)
Dept: PULMONOLOGY | Facility: CLINIC | Age: 79
End: 2024-04-30
Payer: MEDICARE

## 2024-04-30 DIAGNOSIS — F41.9 ANXIETY DISORDER, UNSPECIFIED: ICD-10-CM

## 2024-04-30 PROCEDURE — 99213 OFFICE O/P EST LOW 20 MIN: CPT

## 2024-04-30 NOTE — ASSESSMENT
[FreeTextEntry1] : Bronchiectasis (494.0) (J47.9) Chronic asthmatic bronchitis (493.20) (J44.9) Breathing status unchanged slow decline overall Assessment: COPD bronchiectasis  plan: Stress compliance with inhalers. Renewed today. cont PRN albuterol cont ICS/LABA needs PFT cont O2 increase  cont Pred daily lowest dose possible to keep comfortable. I reviewed the entire case with the patient's son who was in attendance today.  F/U 6 months.

## 2024-04-30 NOTE — REASON FOR VISIT
[Home] : at home, [unfilled] , at the time of the visit. [Medical Office: ([facility])___] : at the medical office located in  [Patient] : the patient [Self] : self [COPD] : COPD [TextBox_44] : doing reasonable well doing . Overall slow drop in well being

## 2024-05-01 NOTE — SWALLOW BEDSIDE ASSESSMENT ADULT - ASR SWALLOW LINGUAL MOBILITY
None within functional limits Saucerization Excision Additional Text (Leave Blank If You Do Not Want): The margin was drawn around the clinically apparent lesion.  Incisions were then made along these lines, in a tangential fashion, to the appropriate tissue plane and the lesion was extirpated.

## 2024-05-16 ENCOUNTER — INPATIENT (INPATIENT)
Facility: HOSPITAL | Age: 79
LOS: 6 days | Discharge: HOME | DRG: 871 | End: 2024-05-23
Attending: STUDENT IN AN ORGANIZED HEALTH CARE EDUCATION/TRAINING PROGRAM | Admitting: STUDENT IN AN ORGANIZED HEALTH CARE EDUCATION/TRAINING PROGRAM
Payer: MEDICARE

## 2024-05-16 VITALS
DIASTOLIC BLOOD PRESSURE: 98 MMHG | HEART RATE: 76 BPM | WEIGHT: 100.09 LBS | OXYGEN SATURATION: 93 % | RESPIRATION RATE: 16 BRPM | SYSTOLIC BLOOD PRESSURE: 137 MMHG

## 2024-05-16 DIAGNOSIS — R41.82 ALTERED MENTAL STATUS, UNSPECIFIED: ICD-10-CM

## 2024-05-16 DIAGNOSIS — Z95.818 PRESENCE OF OTHER CARDIAC IMPLANTS AND GRAFTS: Chronic | ICD-10-CM

## 2024-05-16 LAB
ALBUMIN SERPL ELPH-MCNC: 2.9 G/DL — LOW (ref 3.5–5.2)
ALP SERPL-CCNC: 90 U/L — SIGNIFICANT CHANGE UP (ref 30–115)
ALT FLD-CCNC: 135 U/L — HIGH (ref 0–41)
ANION GAP SERPL CALC-SCNC: 8 MMOL/L — SIGNIFICANT CHANGE UP (ref 7–14)
APPEARANCE UR: CLEAR — SIGNIFICANT CHANGE UP
APTT BLD: 21 SEC — CRITICAL LOW (ref 27–39.2)
AST SERPL-CCNC: 48 U/L — HIGH (ref 0–41)
BACTERIA # UR AUTO: ABNORMAL /HPF
BASE EXCESS BLDV CALC-SCNC: 8.7 MMOL/L — HIGH (ref -2–3)
BASOPHILS # BLD AUTO: 0 K/UL — SIGNIFICANT CHANGE UP (ref 0–0.2)
BASOPHILS NFR BLD AUTO: 0 % — SIGNIFICANT CHANGE UP (ref 0–1)
BILIRUB SERPL-MCNC: 0.4 MG/DL — SIGNIFICANT CHANGE UP (ref 0.2–1.2)
BILIRUB UR-MCNC: NEGATIVE — SIGNIFICANT CHANGE UP
BUN SERPL-MCNC: 39 MG/DL — HIGH (ref 10–20)
CA-I SERPL-SCNC: 1.22 MMOL/L — SIGNIFICANT CHANGE UP (ref 1.15–1.33)
CALCIUM SERPL-MCNC: 8.6 MG/DL — SIGNIFICANT CHANGE UP (ref 8.4–10.5)
CHLORIDE SERPL-SCNC: 102 MMOL/L — SIGNIFICANT CHANGE UP (ref 98–110)
CO2 SERPL-SCNC: 31 MMOL/L — SIGNIFICANT CHANGE UP (ref 17–32)
COLOR SPEC: YELLOW — SIGNIFICANT CHANGE UP
CREAT SERPL-MCNC: 0.6 MG/DL — LOW (ref 0.7–1.5)
DIFF PNL FLD: NEGATIVE — SIGNIFICANT CHANGE UP
EGFR: 91 ML/MIN/1.73M2 — SIGNIFICANT CHANGE UP
EOSINOPHIL NFR BLD AUTO: 0 % — SIGNIFICANT CHANGE UP (ref 0–8)
EPI CELLS # UR: PRESENT
GAS PNL BLDV: 135 MMOL/L — LOW (ref 136–145)
GAS PNL BLDV: SIGNIFICANT CHANGE UP
GAS PNL BLDV: SIGNIFICANT CHANGE UP
GLUCOSE BLDC GLUCOMTR-MCNC: 73 MG/DL — SIGNIFICANT CHANGE UP (ref 70–99)
GLUCOSE SERPL-MCNC: 77 MG/DL — SIGNIFICANT CHANGE UP (ref 70–99)
GLUCOSE UR QL: NEGATIVE MG/DL — SIGNIFICANT CHANGE UP
HCO3 BLDV-SCNC: 37 MMOL/L — HIGH (ref 22–29)
HCT VFR BLD CALC: 34.3 % — LOW (ref 37–47)
HCT VFR BLDA CALC: 42 % — SIGNIFICANT CHANGE UP (ref 34.5–46.5)
HGB BLD CALC-MCNC: 14.1 G/DL — SIGNIFICANT CHANGE UP (ref 11.7–16.1)
HGB BLD-MCNC: 10.7 G/DL — LOW (ref 12–16)
INR BLD: 0.92 RATIO — SIGNIFICANT CHANGE UP (ref 0.65–1.3)
KETONES UR-MCNC: NEGATIVE MG/DL — SIGNIFICANT CHANGE UP
LACTATE BLDV-MCNC: 2.1 MMOL/L — HIGH (ref 0.5–2)
LACTATE SERPL-SCNC: 1.7 MMOL/L — SIGNIFICANT CHANGE UP (ref 0.7–2)
LEUKOCYTE ESTERASE UR-ACNC: NEGATIVE — SIGNIFICANT CHANGE UP
LYMPHOCYTES # BLD AUTO: 0.67 K/UL — LOW (ref 1.2–3.4)
LYMPHOCYTES # BLD AUTO: 9 % — LOW (ref 20.5–51.1)
MANUAL SMEAR VERIFICATION: SIGNIFICANT CHANGE UP
MCHC RBC-ENTMCNC: 31.2 G/DL — LOW (ref 32–37)
MCHC RBC-ENTMCNC: 31.5 PG — HIGH (ref 27–31)
MCV RBC AUTO: 100.9 FL — HIGH (ref 81–99)
MONOCYTES # BLD AUTO: 0.15 K/UL — SIGNIFICANT CHANGE UP (ref 0.1–0.6)
MONOCYTES NFR BLD AUTO: 2 % — SIGNIFICANT CHANGE UP (ref 1.7–9.3)
NEUTROPHILS # BLD AUTO: 6.59 K/UL — HIGH (ref 1.4–6.5)
NEUTROPHILS NFR BLD AUTO: 81 % — HIGH (ref 42.2–75.2)
NEUTS BAND # BLD: 8 % — HIGH (ref 0–6)
NITRITE UR-MCNC: NEGATIVE — SIGNIFICANT CHANGE UP
NRBC # BLD: 0 /100 WBCS — SIGNIFICANT CHANGE UP (ref 0–0)
NRBC # BLD: SIGNIFICANT CHANGE UP /100 WBCS (ref 0–0)
PCO2 BLDV: 65 MMHG — HIGH (ref 39–42)
PH BLDV: 7.36 — SIGNIFICANT CHANGE UP (ref 7.32–7.43)
PH UR: 7 — SIGNIFICANT CHANGE UP (ref 5–8)
PLAT MORPH BLD: NORMAL — SIGNIFICANT CHANGE UP
PLATELET # BLD AUTO: 212 K/UL — SIGNIFICANT CHANGE UP (ref 130–400)
PLATELET CLUMP BLD QL SMEAR: ABNORMAL
PLATELET COUNT - ESTIMATE: NORMAL — SIGNIFICANT CHANGE UP
PMV BLD: 10.4 FL — SIGNIFICANT CHANGE UP (ref 7.4–10.4)
PO2 BLDV: 10 MMHG — LOW (ref 25–45)
POTASSIUM BLDV-SCNC: 4.7 MMOL/L — SIGNIFICANT CHANGE UP (ref 3.5–5.1)
POTASSIUM SERPL-MCNC: 5.3 MMOL/L — HIGH (ref 3.5–5)
POTASSIUM SERPL-SCNC: 5.3 MMOL/L — HIGH (ref 3.5–5)
PROT SERPL-MCNC: 6.1 G/DL — SIGNIFICANT CHANGE UP (ref 6–8)
PROT UR-MCNC: 30 MG/DL
PROTHROM AB SERPL-ACNC: 10.5 SEC — SIGNIFICANT CHANGE UP (ref 9.95–12.87)
RBC # BLD: 3.4 M/UL — LOW (ref 4.2–5.4)
RBC # FLD: 15 % — HIGH (ref 11.5–14.5)
RBC BLD AUTO: NORMAL — SIGNIFICANT CHANGE UP
RBC CASTS # UR COMP ASSIST: 3 /HPF — SIGNIFICANT CHANGE UP (ref 0–4)
SAO2 % BLDV: 13.2 % — LOW (ref 67–88)
SODIUM SERPL-SCNC: 141 MMOL/L — SIGNIFICANT CHANGE UP (ref 135–146)
SP GR SPEC: 1.02 — SIGNIFICANT CHANGE UP (ref 1–1.03)
SQUAMOUS # UR AUTO: 2 /HPF — SIGNIFICANT CHANGE UP (ref 0–5)
TROPONIN T, HIGH SENSITIVITY RESULT: 33 NG/L — HIGH (ref 6–13)
TROPONIN T, HIGH SENSITIVITY RESULT: 36 NG/L — HIGH (ref 6–13)
UROBILINOGEN FLD QL: 0.2 MG/DL — SIGNIFICANT CHANGE UP (ref 0.2–1)
WBC # BLD: 7.4 K/UL — SIGNIFICANT CHANGE UP (ref 4.8–10.8)
WBC # FLD AUTO: 7.4 K/UL — SIGNIFICANT CHANGE UP (ref 4.8–10.8)
WBC UR QL: 6 /HPF — HIGH (ref 0–5)

## 2024-05-16 PROCEDURE — 97530 THERAPEUTIC ACTIVITIES: CPT | Mod: GO

## 2024-05-16 PROCEDURE — 86403 PARTICLE AGGLUT ANTBDY SCRN: CPT

## 2024-05-16 PROCEDURE — 84145 PROCALCITONIN (PCT): CPT

## 2024-05-16 PROCEDURE — 36415 COLL VENOUS BLD VENIPUNCTURE: CPT

## 2024-05-16 PROCEDURE — 70450 CT HEAD/BRAIN W/O DYE: CPT | Mod: 26,MC

## 2024-05-16 PROCEDURE — 97535 SELF CARE MNGMENT TRAINING: CPT | Mod: GO

## 2024-05-16 PROCEDURE — 85025 COMPLETE CBC W/AUTO DIFF WBC: CPT

## 2024-05-16 PROCEDURE — 86706 HEP B SURFACE ANTIBODY: CPT

## 2024-05-16 PROCEDURE — 93306 TTE W/DOPPLER COMPLETE: CPT

## 2024-05-16 PROCEDURE — 87340 HEPATITIS B SURFACE AG IA: CPT

## 2024-05-16 PROCEDURE — 83605 ASSAY OF LACTIC ACID: CPT

## 2024-05-16 PROCEDURE — 93005 ELECTROCARDIOGRAM TRACING: CPT

## 2024-05-16 PROCEDURE — 93010 ELECTROCARDIOGRAM REPORT: CPT | Mod: 76

## 2024-05-16 PROCEDURE — 86803 HEPATITIS C AB TEST: CPT

## 2024-05-16 PROCEDURE — 87040 BLOOD CULTURE FOR BACTERIA: CPT

## 2024-05-16 PROCEDURE — 97116 GAIT TRAINING THERAPY: CPT | Mod: GP

## 2024-05-16 PROCEDURE — 82607 VITAMIN B-12: CPT

## 2024-05-16 PROCEDURE — 83615 LACTATE (LD) (LDH) ENZYME: CPT

## 2024-05-16 PROCEDURE — 97165 OT EVAL LOW COMPLEX 30 MIN: CPT | Mod: GO

## 2024-05-16 PROCEDURE — 74177 CT ABD & PELVIS W/CONTRAST: CPT | Mod: MC

## 2024-05-16 PROCEDURE — 83735 ASSAY OF MAGNESIUM: CPT

## 2024-05-16 PROCEDURE — 71045 X-RAY EXAM CHEST 1 VIEW: CPT

## 2024-05-16 PROCEDURE — 86704 HEP B CORE ANTIBODY TOTAL: CPT

## 2024-05-16 PROCEDURE — 82746 ASSAY OF FOLIC ACID SERUM: CPT

## 2024-05-16 PROCEDURE — 80074 ACUTE HEPATITIS PANEL: CPT

## 2024-05-16 PROCEDURE — 97110 THERAPEUTIC EXERCISES: CPT | Mod: GO

## 2024-05-16 PROCEDURE — 80053 COMPREHEN METABOLIC PANEL: CPT

## 2024-05-16 PROCEDURE — 76705 ECHO EXAM OF ABDOMEN: CPT

## 2024-05-16 PROCEDURE — 97162 PT EVAL MOD COMPLEX 30 MIN: CPT | Mod: GP

## 2024-05-16 PROCEDURE — 71045 X-RAY EXAM CHEST 1 VIEW: CPT | Mod: 26

## 2024-05-16 PROCEDURE — 82248 BILIRUBIN DIRECT: CPT

## 2024-05-16 PROCEDURE — 74018 RADEX ABDOMEN 1 VIEW: CPT

## 2024-05-16 PROCEDURE — 99222 1ST HOSP IP/OBS MODERATE 55: CPT

## 2024-05-16 PROCEDURE — 85027 COMPLETE CBC AUTOMATED: CPT

## 2024-05-16 PROCEDURE — 87449 NOS EACH ORGANISM AG IA: CPT

## 2024-05-16 PROCEDURE — 99285 EMERGENCY DEPT VISIT HI MDM: CPT

## 2024-05-16 PROCEDURE — 82977 ASSAY OF GGT: CPT

## 2024-05-16 PROCEDURE — 94760 N-INVAS EAR/PLS OXIMETRY 1: CPT

## 2024-05-16 PROCEDURE — 87389 HIV-1 AG W/HIV-1&-2 AB AG IA: CPT

## 2024-05-16 RX ORDER — ONDANSETRON 8 MG/1
4 TABLET, FILM COATED ORAL EVERY 8 HOURS
Refills: 0 | Status: DISCONTINUED | OUTPATIENT
Start: 2024-05-16 | End: 2024-05-23

## 2024-05-16 RX ORDER — LISINOPRIL 2.5 MG/1
10 TABLET ORAL DAILY
Refills: 0 | Status: DISCONTINUED | OUTPATIENT
Start: 2024-05-16 | End: 2024-05-23

## 2024-05-16 RX ORDER — MIRTAZAPINE 45 MG/1
1 TABLET, ORALLY DISINTEGRATING ORAL
Refills: 0 | DISCHARGE

## 2024-05-16 RX ORDER — SODIUM CHLORIDE 9 MG/ML
1000 INJECTION INTRAMUSCULAR; INTRAVENOUS; SUBCUTANEOUS
Refills: 0 | Status: DISCONTINUED | OUTPATIENT
Start: 2024-05-16 | End: 2024-05-18

## 2024-05-16 RX ORDER — FLUTICASONE PROPIONATE AND SALMETEROL 50; 250 UG/1; UG/1
1 POWDER ORAL; RESPIRATORY (INHALATION)
Qty: 0 | Refills: 0 | DISCHARGE

## 2024-05-16 RX ORDER — DORZOLAMIDE HYDROCHLORIDE TIMOLOL MALEATE 20; 5 MG/ML; MG/ML
1 SOLUTION/ DROPS OPHTHALMIC
Qty: 0 | Refills: 0 | DISCHARGE

## 2024-05-16 RX ORDER — ASPIRIN/CALCIUM CARB/MAGNESIUM 324 MG
81 TABLET ORAL DAILY
Refills: 0 | Status: DISCONTINUED | OUTPATIENT
Start: 2024-05-16 | End: 2024-05-23

## 2024-05-16 RX ORDER — LISINOPRIL 2.5 MG/1
1 TABLET ORAL
Qty: 0 | Refills: 0 | DISCHARGE

## 2024-05-16 RX ORDER — MONTELUKAST 4 MG/1
10 TABLET, CHEWABLE ORAL DAILY
Refills: 0 | Status: DISCONTINUED | OUTPATIENT
Start: 2024-05-16 | End: 2024-05-23

## 2024-05-16 RX ORDER — MONTELUKAST 4 MG/1
1 TABLET, CHEWABLE ORAL
Qty: 0 | Refills: 0 | DISCHARGE

## 2024-05-16 RX ORDER — ENOXAPARIN SODIUM 100 MG/ML
40 INJECTION SUBCUTANEOUS EVERY 24 HOURS
Refills: 0 | Status: DISCONTINUED | OUTPATIENT
Start: 2024-05-16 | End: 2024-05-23

## 2024-05-16 RX ORDER — ASCORBIC ACID 60 MG
1 TABLET,CHEWABLE ORAL
Qty: 0 | Refills: 0 | DISCHARGE

## 2024-05-16 RX ORDER — SODIUM CHLORIDE 9 MG/ML
1400 INJECTION INTRAMUSCULAR; INTRAVENOUS; SUBCUTANEOUS ONCE
Refills: 0 | Status: COMPLETED | OUTPATIENT
Start: 2024-05-16 | End: 2024-05-16

## 2024-05-16 RX ORDER — BUDESONIDE AND FORMOTEROL FUMARATE DIHYDRATE 160; 4.5 UG/1; UG/1
2 AEROSOL RESPIRATORY (INHALATION)
Refills: 0 | Status: DISCONTINUED | OUTPATIENT
Start: 2024-05-16 | End: 2024-05-23

## 2024-05-16 RX ORDER — ACETAMINOPHEN 500 MG
650 TABLET ORAL EVERY 6 HOURS
Refills: 0 | Status: DISCONTINUED | OUTPATIENT
Start: 2024-05-16 | End: 2024-05-23

## 2024-05-16 RX ORDER — ASCORBIC ACID 60 MG
500 TABLET,CHEWABLE ORAL DAILY
Refills: 0 | Status: DISCONTINUED | OUTPATIENT
Start: 2024-05-16 | End: 2024-05-23

## 2024-05-16 RX ORDER — TIOTROPIUM BROMIDE 18 UG/1
2 CAPSULE ORAL; RESPIRATORY (INHALATION) DAILY
Refills: 0 | Status: DISCONTINUED | OUTPATIENT
Start: 2024-05-16 | End: 2024-05-23

## 2024-05-16 RX ORDER — DORZOLAMIDE HYDROCHLORIDE TIMOLOL MALEATE 20; 5 MG/ML; MG/ML
1 SOLUTION/ DROPS OPHTHALMIC
Refills: 0 | Status: DISCONTINUED | OUTPATIENT
Start: 2024-05-16 | End: 2024-05-19

## 2024-05-16 RX ORDER — SENNA PLUS 8.6 MG/1
2 TABLET ORAL AT BEDTIME
Refills: 0 | Status: DISCONTINUED | OUTPATIENT
Start: 2024-05-16 | End: 2024-05-23

## 2024-05-16 RX ORDER — ALENDRONATE SODIUM 70 MG/1
0 TABLET ORAL
Qty: 0 | Refills: 0 | DISCHARGE

## 2024-05-16 RX ORDER — CHOLECALCIFEROL (VITAMIN D3) 125 MCG
1 CAPSULE ORAL
Qty: 0 | Refills: 0 | DISCHARGE

## 2024-05-16 RX ORDER — CEFTRIAXONE 500 MG/1
1000 INJECTION, POWDER, FOR SOLUTION INTRAMUSCULAR; INTRAVENOUS EVERY 24 HOURS
Refills: 0 | Status: DISCONTINUED | OUTPATIENT
Start: 2024-05-16 | End: 2024-05-17

## 2024-05-16 RX ORDER — LISINOPRIL 2.5 MG/1
10 TABLET ORAL DAILY
Refills: 0 | Status: DISCONTINUED | OUTPATIENT
Start: 2024-05-16 | End: 2024-05-16

## 2024-05-16 RX ORDER — POLYETHYLENE GLYCOL 3350 17 G/17G
17 POWDER, FOR SOLUTION ORAL DAILY
Refills: 0 | Status: DISCONTINUED | OUTPATIENT
Start: 2024-05-16 | End: 2024-05-22

## 2024-05-16 RX ORDER — MIRTAZAPINE 45 MG/1
7.5 TABLET, ORALLY DISINTEGRATING ORAL DAILY
Refills: 0 | Status: DISCONTINUED | OUTPATIENT
Start: 2024-05-16 | End: 2024-05-19

## 2024-05-16 RX ORDER — TIOTROPIUM BROMIDE 18 UG/1
1 CAPSULE ORAL; RESPIRATORY (INHALATION)
Refills: 0 | DISCHARGE

## 2024-05-16 RX ORDER — AZITHROMYCIN 500 MG/1
500 TABLET, FILM COATED ORAL EVERY 24 HOURS
Refills: 0 | Status: DISCONTINUED | OUTPATIENT
Start: 2024-05-16 | End: 2024-05-17

## 2024-05-16 RX ADMIN — Medication 500 MILLIGRAM(S): at 18:27

## 2024-05-16 RX ADMIN — CEFTRIAXONE 100 MILLIGRAM(S): 500 INJECTION, POWDER, FOR SOLUTION INTRAMUSCULAR; INTRAVENOUS at 22:39

## 2024-05-16 RX ADMIN — ENOXAPARIN SODIUM 40 MILLIGRAM(S): 100 INJECTION SUBCUTANEOUS at 18:27

## 2024-05-16 RX ADMIN — AZITHROMYCIN 255 MILLIGRAM(S): 500 TABLET, FILM COATED ORAL at 18:28

## 2024-05-16 RX ADMIN — BUDESONIDE AND FORMOTEROL FUMARATE DIHYDRATE 2 PUFF(S): 160; 4.5 AEROSOL RESPIRATORY (INHALATION) at 22:40

## 2024-05-16 RX ADMIN — Medication 125 MILLIGRAM(S): at 10:08

## 2024-05-16 RX ADMIN — SODIUM CHLORIDE 50 MILLILITER(S): 9 INJECTION INTRAMUSCULAR; INTRAVENOUS; SUBCUTANEOUS at 19:36

## 2024-05-16 RX ADMIN — Medication 10 MILLIGRAM(S): at 22:39

## 2024-05-16 RX ADMIN — SODIUM CHLORIDE 1400 MILLILITER(S): 9 INJECTION INTRAMUSCULAR; INTRAVENOUS; SUBCUTANEOUS at 10:08

## 2024-05-16 NOTE — DIETITIAN INITIAL EVALUATION ADULT - PERTINENT LABORATORY DATA
05-16    141  |  102  |  39<H>  ----------------------------<  77  5.3<H>   |  31  |  0.6<L>    Ca    8.6      16 May 2024 09:41    TPro  6.1  /  Alb  2.9<L>  /  TBili  0.4  /  DBili  x   /  AST  48<H>  /  ALT  135<H>  /  AlkPhos  90  05-16  POCT Blood Glucose.: 73 mg/dL (05-16-24 @ 08:49)                          10.7   7.40  )-----------( 212      ( 16 May 2024 09:41 )             34.3

## 2024-05-16 NOTE — H&P ADULT - NSICDXFAMILYHX_GEN_ALL_CORE_FT
FAMILY HISTORY:  Father  Still living? Unknown  FHx: kidney cancer, Age at diagnosis: Age Unknown    Mother  Still living? Unknown  FHx: breast cancer, Age at diagnosis: Age Unknown    Child  Still living? Unknown  Family history of anxiety disorder, Age at diagnosis: Age Unknown

## 2024-05-16 NOTE — ED PROVIDER NOTE - CLINICAL SUMMARY MEDICAL DECISION MAKING FREE TEXT BOX
78-year-old female past medical history of bronchiectasis, COPD on home oxygen, CVA, IBS presents to the emergency department for decreased responsiveness, worsening weakness, abdominal cramping.  Additional history obtained from patient's son.  Patient is on daily prednisone.  Son reports that those symptoms have been worsening over the past couple weeks, this morning patient was disoriented and very confused and was not responding well.  He reports it is unsafe for her to be at home at this time as he has to carry up the stairs and on and off the toilet.  He is concerned about her nutrition and worsening dementia.    On exam, vital signs reviewed.  Patient is chronically ill and cachectic appearing.  Patient appears dehydrated.  Patient is normal heart and lung exam.  Patient has a soft abdomen.  Patient is moving all extremities.  IV placed and labs sent, urine sent, patient had CT performed.  CT head shows no acute changes.  X-ray concerning for left basilar opacity.  White blood cell count is normal.  Patient given ceftriaxone and azithromycin given abnormal x-ray and elevated bands.  Patient given Solu-Medrol.    ED work up reviewed and results and plan of care discussed with patients son. Patient requires admission for further work up, monitoring, and management. Need for admission discussed with patients son.

## 2024-05-16 NOTE — ED PROVIDER NOTE - ATTENDING CONTRIBUTION TO CARE
I personally evaluated patient. I agree with the findings and plan with all documentation on chart except as documented  in my note.    78-year-old female past medical history of bronchiectasis, COPD on home oxygen, CVA, IBS presents to the emergency department for decreased responsiveness, worsening weakness, abdominal cramping.  Additional history obtained from patient's son.  Patient is on daily prednisone.  Son reports that those symptoms have been worsening over the past couple weeks, this morning patient was disoriented and very confused and was not responding well.  He reports it is unsafe for her to be at home at this time as he has to carry up the stairs and on and off the toilet.  He is concerned about her nutrition and worsening dementia.    On exam, vital signs reviewed.  Patient is chronically ill and cachectic appearing.  Patient appears dehydrated.  Patient is normal heart and lung exam.  Patient has a soft abdomen.  Patient is moving all extremities.  IV placed and labs sent, urine sent, patient had CT performed.  CT head shows no acute changes.  X-ray concerning for left basilar opacity.  White blood cell count is normal.  Patient given ceftriaxone and azithromycin given abnormal x-ray and elevated bands.  Patient given Solu-Medrol.    ED work up reviewed and results and plan of care discussed with patients son. Patient requires admission for further work up, monitoring, and management. Need for admission discussed with patients son.

## 2024-05-16 NOTE — H&P ADULT - NSICDXPASTMEDICALHX_GEN_ALL_CORE_FT
PAST MEDICAL HISTORY:  Advanced COPD     Anxiety     Colitis     CVA (cerebrovascular accident)     Glaucoma     High cholesterol     HTN (hypertension)

## 2024-05-16 NOTE — H&P ADULT - NSHPSOCIALHISTORY_GEN_ALL_CORE
78 year old  woman living at home with son and .  No pets, no history of smoking, no history of drinking.  No illicit drug use.

## 2024-05-16 NOTE — H&P ADULT - NS ATTEND AMEND GEN_ALL_CORE FT
79 yo F brought in by ambulance today for an episode of unresponsiveness and increased confusion this morning.    on exam:  GEN: somnolent   HEENT: normocephalic, atraumatic, anicteric  LUNGS: b/l breath sounds present, clear to auscultation bilaterally   HEART: S1/S2 present. RRR, no murmurs  ABD: Soft, non-tender, non-distended. Bowel sounds present  EXT: warm   NEURO: somnolent, no new focal deficits    Agree with plan as above

## 2024-05-16 NOTE — H&P ADULT - ASSESSMENT
# Patient is a poor historian. HPI obtained from  and two sons.      Patient is a 78 year old  female brought in by ambulance today for an episode of unresponsiveness and increased confusion this morning.  Family states that the patient has been taking Prednisone since January for COPD and IBS and attributes the change in mental status to a side effect of the steroid.  Patient was disoriented and confused this morning and could not respond to her son.  Patient also has been eating less and has needed to be carried upstairs and on/off the toilet an increasing amount of times since the winter.  Patient's confusion was in relation to daily routines rather than forgetting names and places.  Patient has increased tiredness and weakness/lack of energy - not being able to keep her head up, per son.  The symptoms are worse when the patient is tired, in the morning and at night.      # Failure to Thrive   # Acute / Chronic dementia r/o infection -- ? PNA / URINE   # Bandemia with NL WBC   # COPD on home o2  long term steroids use , suspect infection / lactic acid elevated   - pulm consult   - light hydration with 50cc/ hr   - await bld/ urine cx   - nutritionist eval   - diet with ensure   - iv rocephin and zithro   - mrsa / leg / strep   - ot / pt eval   - routine labs in am with procal   - sw/  referral   - o2   2L- 3L liters - home dose   - oob to chair   - c/w Spiriva / singular / Advair ( symbicort )   - c/w home pred dose     # Anemia with macrocytosis   hgb stable   - added folate and b12 levels     # IBS- constipation   - miralax / senna/ dulculax   - tap water enema prn   - c/w bentyl     # anxiety / MDD  - c/w home remeron / busapr     # HTn   - c/w acei  - low salt diet     DNR/ DNI     molst in chart     d/w Attending

## 2024-05-16 NOTE — ED PROVIDER NOTE - OBJECTIVE STATEMENT
78-year-old female past medical history of CVA, bronchiectasis and COPD on 3 L nasal cannula oxygen at home, IBS on chronic prednisone, presenting for generalized weakness, decreased responsiveness, abdominal cramping since last night.  Patient reports pain is similar to her chronic IBS pain, denies taking any medications for pain this morning.  Per son at bedside who lives with her, patient has been decreasing her prednisone dose on a taper at home, last took 25 mg prednisone yesterday. Patient and son at bedside denies any recent fever, falls. Patient denies any chest pain, increased difficulty breathing, nausea, vomiting, diarrhea

## 2024-05-16 NOTE — H&P ADULT - NSHPREVIEWOFSYSTEMS_GEN_ALL_CORE
REVIEW OF SYSTEMS      General: Weight loss, fatigue, tired, weakness    Respiratory and Thorax: SOB, labored breathing  	  Gastrointestinal: Cramping	    Neuro/Psych: Confusion, altered mental status,

## 2024-05-16 NOTE — ED ADULT TRIAGE NOTE - BP NONINVASIVE SYSTOLIC (MM HG)
137
weak and dizzy with low sodium- sent by MD for evaluation and treatment ; has been treated for same in past few weeks; also has back pain - lower right --- since fall over 3 weeks ago--- takes Tylenol with some relief--- warm packs applied here and states "it hit the spot"

## 2024-05-16 NOTE — ED PROVIDER NOTE - DIFFERENTIAL DIAGNOSIS
The differential diagnosis for patients clinical presentation includes but is not limited to:  infection, UTI, viral syndrome, CVA, colitis, pancreatitis, UTI, infection, Diverticulitis, SBO, biliary colic, cholecystitis, pyelonephritis, aortic dissection Differential Diagnosis

## 2024-05-16 NOTE — H&P ADULT - NSHPPHYSICALEXAM_GEN_ALL_CORE
PHYSICAL EXAM:      Constitutional: Patient is a 78 year old woman laying comfortably in a hospital bed in a hospital gown.     Respiratory: CTA b/l, Labored breathing    Cardiovascular: RRR, S1,S2, no MRG    Gastrointestinal: Soft and nontender , no lumps or masses, no pain on palpation    Neuro/Psychiatric: Confused, altered mental status, A&O x2  to place and person Vital Signs Last 24 Hrs  T(C): 36.7 (16 May 2024 14:18), Max: 36.7 (16 May 2024 14:18)  T(F): 98 (16 May 2024 14:18), Max: 98 (16 May 2024 14:18)  HR: 77 (16 May 2024 14:18) (76 - 102)  BP: 113/75 (16 May 2024 14:18) (113/75 - 180/80)  BP(mean): 115 (16 May 2024 10:16) (115 - 115)  RR: 18 (16 May 2024 14:18) (16 - 18)  SpO2: 93% (16 May 2024 14:18) (92% - 93%)    Parameters below as of 16 May 2024 14:18  Patient On (Oxygen Delivery Method): nasal cannula  O2 Flow (L/min): 2      PHYSICAL EXAM:    Constitutional: Patient is a 78 year old woman laying comfortably in a hospital bed in a hospital gown. , alert x o x3 , nad   HEENT: nc / at  Respiratory: CTA b/l, Labored breathing  Cardiovascular: RRR, S1,S2, no MRG  Gastrointestinal: Soft and nontender , no lumps or masses, no pain on palpation  Ext: no edema   Neuro/Psychiatric: Confused, altered mental status, A&O x2  to place and person

## 2024-05-16 NOTE — DIETITIAN INITIAL EVALUATION ADULT - PERTINENT MEDS FT
MEDICATIONS  (STANDING):  ascorbic acid 500 milliGRAM(s) Oral daily  aspirin  chewable 81 milliGRAM(s) Oral daily  azithromycin  IVPB 500 milliGRAM(s) IV Intermittent every 24 hours  budesonide 160 MICROgram(s)/formoterol 4.5 MICROgram(s) Inhaler 2 Puff(s) Inhalation two times a day  busPIRone 20 milliGRAM(s) Oral <User Schedule>  busPIRone 10 milliGRAM(s) Oral <User Schedule>  cefTRIAXone   IVPB 1000 milliGRAM(s) IV Intermittent every 24 hours  dorzolamide 2%/timolol 0.5% Ophthalmic Solution 1 Drop(s) Both EYES two times a day  enoxaparin Injectable 40 milliGRAM(s) SubCutaneous every 24 hours  lisinopril 10 milliGRAM(s) Oral daily  mirtazapine 7.5 milliGRAM(s) Oral daily  montelukast 10 milliGRAM(s) Oral daily  senna 2 Tablet(s) Oral at bedtime  sodium chloride 0.9%. 1000 milliLiter(s) (50 mL/Hr) IV Continuous <Continuous>  tiotropium 2.5 MICROgram(s) Inhaler 2 Puff(s) Inhalation daily    MEDICATIONS  (PRN):  acetaminophen     Tablet .. 650 milliGRAM(s) Oral every 6 hours PRN Temp greater or equal to 38C (100.4F), Mild Pain (1 - 3)  benzonatate 100 milliGRAM(s) Oral three times a day PRN Cough  bisacodyl 5 milliGRAM(s) Oral every 12 hours PRN Constipation  dicyclomine 10 milliGRAM(s) Oral three times a day before meals PRN abd cramps  ondansetron Injectable 4 milliGRAM(s) IV Push every 8 hours PRN Nausea and/or Vomiting  polyethylene glycol 3350 17 Gram(s) Oral daily PRN Constipation

## 2024-05-16 NOTE — DIETITIAN NUTRITION RISK NOTIFICATION - TREATMENT: THE FOLLOWING DIET HAS BEEN RECOMMENDED
Diet, Regular:   DASH/TLC {Sodium & Cholesterol Restricted} (DASH)  Low Sodium  Supplement Feeding Modality:  Oral  Ensure Enlive Cans or Servings Per Day:  1       Frequency:  Three Times a day (05-16-24 @ 16:56) [Active]       Epidural

## 2024-05-16 NOTE — H&P ADULT - HISTORY OF PRESENT ILLNESS
Patient is a poor historian. HPI obtained from  and two sons.  Patient is a 78 year old  female brought in by ambulance today for an episode of unresponsiveness and increased confusion this morning.  Family states that the patient has been taking Prednisone since January for COPD and IBS and attributes the change in mental status to a side effect of the steroid.  Patient was disoriented and confused this morning and could not respond to her son.  Patient also has been eating less and has needed to be carried upstairs and on/off the toilet an increasing amount of times since the winter.  Patient's confusion was in relation to daily routines rather than forgetting names and places.  Patient has increased tiredness and weakness/lack of energy - not being able to keep her head up, per son.  The symptoms are worse when the patient is tired, in the morning and at night.  Patient needs to be handfed by son.  Nothing makes the symptoms better or worse.  Son attributes the symptoms to a lack of nutrition due to patients fear of eating pains and bathroom pains due to Colitis and IBS.  Patient and family deny any other symptoms such as urinary incontinence, stool incontinence, agitation, dementia.   Patient is a poor historian. HPI obtained from  and two sons.      Patient is a 78 year old  female brought in by ambulance today for an episode of unresponsiveness and increased confusion this morning.  Family states that the patient has been taking Prednisone since January for COPD and IBS and attributes the change in mental status to a side effect of the steroid.  Patient was disoriented and confused this morning and could not respond to her son.  Patient also has been eating less and has needed to be carried upstairs and on/off the toilet an increasing amount of times since the winter.  Patient's confusion was in relation to daily routines rather than forgetting names and places.  Patient has increased tiredness and weakness/lack of energy - not being able to keep her head up, per son.  The symptoms are worse when the patient is tired, in the morning and at night.  Patient needs to be handled by son.  Nothing makes the symptoms better or worse.  Son attributes the symptoms to a lack of nutrition due to patients fear of eating pains and bathroom pains due to Colitis and IBS.      Patient and family deny any other symptoms such as urinary incontinence, stool incontinence, agitation, dementia.    denies cp /fever / chills  Patient is a poor historian. HPI obtained from  and two sons.      Patient is a 78 year old  female brought in by ambulance today for an episode of unresponsiveness and increased confusion this morning.  Family states that the patient has been taking Prednisone since January for COPD and IBS and attributes the change in mental status to a side effect of the steroid.  Patient was disoriented and confused this morning and could not respond to her son.  Patient also has been eating less and has needed to be carried upstairs and on/off the toilet an increasing amount of times since the winter.  Patient's confusion was in relation to daily routines rather than forgetting names and places.  Patient has increased tiredness and weakness/lack of energy - not being able to keep her head up, per son.  The symptoms are worse when the patient is tired, in the morning and at night.  Patient needs to be handled by son.  Nothing makes the symptoms better or worse.  Son attributes the symptoms to a lack of nutrition due to patients fear of eating pains and bathroom pains due to Colitis and IBS.      Patient and family deny any other symptoms such as urinary incontinence, stool incontinence, agitation, dementia.    denies cp /fever / chills     pcp : home care program with dr jesus / chelsey madison   last colonoscopy 10 years ago , unknown gi     ED : given solumedrol IVP Patient is a poor historian. HPI obtained from  and two sons.      Patient is a 78 year old  female brought in by ambulance today for an episode of unresponsiveness and increased confusion this morning.  Family states that the patient has been taking Prednisone since January for COPD and IBS and attributes the change in mental status to a side effect of the steroid.  Patient was disoriented and confused this morning and could not respond to her son.  Patient also has been eating less and has needed to be carried upstairs and on/off the toilet an increasing amount of times since the winter.  Patient's confusion was in relation to daily routines rather than forgetting names and places.  Patient has increased tiredness and weakness/lack of energy - not being able to keep her head up, per son.  The symptoms are worse when the patient is tired, in the morning and at night.  Patient needs to be handled by son.  Nothing makes the symptoms better or worse.  Son attributes the symptoms to a lack of nutrition due to patients fear of eating pains and bathroom pains due to Colitis and IBS.      Patient and family deny any other symptoms such as urinary incontinence, stool incontinence, agitation, dementia.    denies cp /fever / chills     pcp : home care program with dr jesus / chelsey madison   last colonoscopy 10 years ago , unknown gi     ED : given solumedrol IVP    son GEORGETTE : [id number]                      [id number]  son CORBIN : [id number]    GEORGETTE .  [id number]

## 2024-05-16 NOTE — DIETITIAN INITIAL EVALUATION ADULT - NS FNS DIET ORDER
Diet, Regular:   DASH/TLC {Sodium & Cholesterol Restricted} (DASH)  Low Sodium  Supplement Feeding Modality:  Oral  Ensure Enlive Cans or Servings Per Day:  1       Frequency:  Three Times a day (05-16-24 @ 16:56)

## 2024-05-16 NOTE — DIETITIAN INITIAL EVALUATION ADULT - ORAL INTAKE PTA/DIET HISTORY
as per son at bedside pt consumes a very limited diet 2/2 hx of IBS and colitis. extensive list of food "intolerances" forwarded to dietary including no dairy, no eggs, no raw fruits or vegetables, no red meat, no pork. Pt is allowed to have almond milk, plain white bread, white potatoes, turkey and grilled chicken. as per EMR review and confirmed by son pt has lost ~5 kgs over the course of the past 2 years Family states this was gradual due to worsening IBS.    presently on a DASH/TLC diet with ensure. observed pt consume 100% of turkey sandwich. son refuses to give pt anything whey based therefore provided pt with Mobilitrix standard shake (plant based) son was receptive and is willing to see if pt can tolerate.

## 2024-05-16 NOTE — ED ADULT NURSE NOTE - NSFALLHARMRISKINTERV_ED_ALL_ED

## 2024-05-16 NOTE — DIETITIAN INITIAL EVALUATION ADULT - OTHER INFO
pt is 78 year old female with hx of COPD, IBS, colitis, CVA,  HTN p/w unresponsiveness admitted with failure to thrive acute vs chronic dementia? r/o infection pt is 78 year old female with hx of COPD, IBS, colitis, CVA,  HTN p/w increased confusion and unresponsiveness admitted with failure to thrive acute vs chronic dementia? r/o infection

## 2024-05-17 ENCOUNTER — APPOINTMENT (OUTPATIENT)
Dept: CARDIOLOGY | Facility: CLINIC | Age: 79
End: 2024-05-17
Payer: MEDICARE

## 2024-05-17 ENCOUNTER — NON-APPOINTMENT (OUTPATIENT)
Age: 79
End: 2024-05-17

## 2024-05-17 LAB
-  ENTEROBACTERALES: SIGNIFICANT CHANGE UP
ALBUMIN SERPL ELPH-MCNC: 2.6 G/DL — LOW (ref 3.5–5.2)
ALP SERPL-CCNC: 106 U/L — SIGNIFICANT CHANGE UP (ref 30–115)
ALT FLD-CCNC: 139 U/L — HIGH (ref 0–41)
ANION GAP SERPL CALC-SCNC: 11 MMOL/L — SIGNIFICANT CHANGE UP (ref 7–14)
AST SERPL-CCNC: 72 U/L — HIGH (ref 0–41)
BASOPHILS # BLD AUTO: 0.04 K/UL — SIGNIFICANT CHANGE UP (ref 0–0.2)
BASOPHILS NFR BLD AUTO: 0.4 % — SIGNIFICANT CHANGE UP (ref 0–1)
BILIRUB SERPL-MCNC: 0.2 MG/DL — SIGNIFICANT CHANGE UP (ref 0.2–1.2)
BUN SERPL-MCNC: 34 MG/DL — HIGH (ref 10–20)
CALCIUM SERPL-MCNC: 8.1 MG/DL — LOW (ref 8.4–10.5)
CHLORIDE SERPL-SCNC: 105 MMOL/L — SIGNIFICANT CHANGE UP (ref 98–110)
CO2 SERPL-SCNC: 25 MMOL/L — SIGNIFICANT CHANGE UP (ref 17–32)
CREAT SERPL-MCNC: 0.7 MG/DL — SIGNIFICANT CHANGE UP (ref 0.7–1.5)
EGFR: 88 ML/MIN/1.73M2 — SIGNIFICANT CHANGE UP
EOSINOPHIL # BLD AUTO: 0 K/UL — SIGNIFICANT CHANGE UP (ref 0–0.7)
EOSINOPHIL NFR BLD AUTO: 0 % — SIGNIFICANT CHANGE UP (ref 0–8)
FOLATE SERPL-MCNC: >20 NG/ML — SIGNIFICANT CHANGE UP
GLUCOSE SERPL-MCNC: 197 MG/DL — HIGH (ref 70–99)
GRAM STN FLD: ABNORMAL
HCT VFR BLD CALC: 32.8 % — LOW (ref 37–47)
HGB BLD-MCNC: 10.3 G/DL — LOW (ref 12–16)
IMM GRANULOCYTES NFR BLD AUTO: 0.6 % — HIGH (ref 0.1–0.3)
LACTATE SERPL-SCNC: 3.3 MMOL/L — HIGH (ref 0.7–2)
LYMPHOCYTES # BLD AUTO: 0.58 K/UL — LOW (ref 1.2–3.4)
LYMPHOCYTES # BLD AUTO: 6.1 % — LOW (ref 20.5–51.1)
MCHC RBC-ENTMCNC: 31.3 PG — HIGH (ref 27–31)
MCHC RBC-ENTMCNC: 31.4 G/DL — LOW (ref 32–37)
MCV RBC AUTO: 99.7 FL — HIGH (ref 81–99)
METHOD TYPE: SIGNIFICANT CHANGE UP
MONOCYTES # BLD AUTO: 0.57 K/UL — SIGNIFICANT CHANGE UP (ref 0.1–0.6)
MONOCYTES NFR BLD AUTO: 6 % — SIGNIFICANT CHANGE UP (ref 1.7–9.3)
NEUTROPHILS # BLD AUTO: 8.3 K/UL — HIGH (ref 1.4–6.5)
NEUTROPHILS NFR BLD AUTO: 86.9 % — HIGH (ref 42.2–75.2)
NRBC # BLD: 0 /100 WBCS — SIGNIFICANT CHANGE UP (ref 0–0)
PLATELET # BLD AUTO: 222 K/UL — SIGNIFICANT CHANGE UP (ref 130–400)
PMV BLD: 9.4 FL — SIGNIFICANT CHANGE UP (ref 7.4–10.4)
POTASSIUM SERPL-MCNC: 4.1 MMOL/L — SIGNIFICANT CHANGE UP (ref 3.5–5)
POTASSIUM SERPL-SCNC: 4.1 MMOL/L — SIGNIFICANT CHANGE UP (ref 3.5–5)
PROT SERPL-MCNC: 5.7 G/DL — LOW (ref 6–8)
RBC # BLD: 3.29 M/UL — LOW (ref 4.2–5.4)
RBC # FLD: 15 % — HIGH (ref 11.5–14.5)
SODIUM SERPL-SCNC: 141 MMOL/L — SIGNIFICANT CHANGE UP (ref 135–146)
SPECIMEN SOURCE: SIGNIFICANT CHANGE UP
SPECIMEN SOURCE: SIGNIFICANT CHANGE UP
VIT B12 SERPL-MCNC: 1372 PG/ML — HIGH (ref 232–1245)
WBC # BLD: 9.55 K/UL — SIGNIFICANT CHANGE UP (ref 4.8–10.8)
WBC # FLD AUTO: 9.55 K/UL — SIGNIFICANT CHANGE UP (ref 4.8–10.8)

## 2024-05-17 PROCEDURE — 76705 ECHO EXAM OF ABDOMEN: CPT | Mod: 26

## 2024-05-17 PROCEDURE — 99232 SBSQ HOSP IP/OBS MODERATE 35: CPT

## 2024-05-17 PROCEDURE — 99222 1ST HOSP IP/OBS MODERATE 55: CPT

## 2024-05-17 PROCEDURE — 74177 CT ABD & PELVIS W/CONTRAST: CPT | Mod: 26

## 2024-05-17 PROCEDURE — 93298 REM INTERROG DEV EVAL SCRMS: CPT

## 2024-05-17 RX ORDER — CEFEPIME 1 G/1
1000 INJECTION, POWDER, FOR SOLUTION INTRAMUSCULAR; INTRAVENOUS EVERY 8 HOURS
Refills: 0 | Status: DISCONTINUED | OUTPATIENT
Start: 2024-05-17 | End: 2024-05-17

## 2024-05-17 RX ORDER — CEFEPIME 1 G/1
2000 INJECTION, POWDER, FOR SOLUTION INTRAMUSCULAR; INTRAVENOUS EVERY 8 HOURS
Refills: 0 | Status: DISCONTINUED | OUTPATIENT
Start: 2024-05-17 | End: 2024-05-17

## 2024-05-17 RX ORDER — CEFEPIME 1 G/1
2000 INJECTION, POWDER, FOR SOLUTION INTRAMUSCULAR; INTRAVENOUS EVERY 12 HOURS
Refills: 0 | Status: DISCONTINUED | OUTPATIENT
Start: 2024-05-17 | End: 2024-05-19

## 2024-05-17 RX ADMIN — Medication 650 MILLIGRAM(S): at 09:54

## 2024-05-17 RX ADMIN — TIOTROPIUM BROMIDE 2 PUFF(S): 18 CAPSULE ORAL; RESPIRATORY (INHALATION) at 09:07

## 2024-05-17 RX ADMIN — ENOXAPARIN SODIUM 40 MILLIGRAM(S): 100 INJECTION SUBCUTANEOUS at 18:04

## 2024-05-17 RX ADMIN — MIRTAZAPINE 7.5 MILLIGRAM(S): 45 TABLET, ORALLY DISINTEGRATING ORAL at 12:51

## 2024-05-17 RX ADMIN — Medication 10 MILLIGRAM(S): at 18:04

## 2024-05-17 RX ADMIN — Medication 81 MILLIGRAM(S): at 12:50

## 2024-05-17 RX ADMIN — DORZOLAMIDE HYDROCHLORIDE TIMOLOL MALEATE 1 DROP(S): 20; 5 SOLUTION/ DROPS OPHTHALMIC at 17:15

## 2024-05-17 RX ADMIN — Medication 650 MILLIGRAM(S): at 10:31

## 2024-05-17 RX ADMIN — Medication 10 MILLIGRAM(S): at 12:51

## 2024-05-17 RX ADMIN — LISINOPRIL 10 MILLIGRAM(S): 2.5 TABLET ORAL at 06:35

## 2024-05-17 RX ADMIN — Medication 500 MILLIGRAM(S): at 12:49

## 2024-05-17 RX ADMIN — Medication 20 MILLIGRAM(S): at 06:35

## 2024-05-17 RX ADMIN — CEFEPIME 100 MILLIGRAM(S): 1 INJECTION, POWDER, FOR SOLUTION INTRAMUSCULAR; INTRAVENOUS at 12:49

## 2024-05-17 RX ADMIN — Medication 20 MILLIGRAM(S): at 08:07

## 2024-05-17 RX ADMIN — MONTELUKAST 10 MILLIGRAM(S): 4 TABLET, CHEWABLE ORAL at 12:49

## 2024-05-17 NOTE — CONSULT NOTE ADULT - SUBJECTIVE AND OBJECTIVE BOX
JEAN BURNS  78y, Female  Allergies    penicillins (Unknown)    Intolerances    LOS  1d    HPI  HPI:  Patient is a poor historian. HPI obtained from  and two sons.      Patient is a 78 year old  female brought in by ambulance today for an episode of unresponsiveness and increased confusion this morning.  Family states that the patient has been taking Prednisone since January for COPD and IBS and attributes the change in mental status to a side effect of the steroid.  Patient was disoriented and confused this morning and could not respond to her son.  Patient also has been eating less and has needed to be carried upstairs and on/off the toilet an increasing amount of times since the winter.  Patient's confusion was in relation to daily routines rather than forgetting names and places.  Patient has increased tiredness and weakness/lack of energy - not being able to keep her head up, per son.  The symptoms are worse when the patient is tired, in the morning and at night.  Patient needs to be handled by son.  Nothing makes the symptoms better or worse.  Son attributes the symptoms to a lack of nutrition due to patients fear of eating pains and bathroom pains due to Colitis and IBS.      Patient and family deny any other symptoms such as urinary incontinence, stool incontinence, agitation, dementia.    denies cp /fever / chills     pcp : home care program with dr jesus / chelsey [facility]   last colonoscopy 10 years ago , unknown gi     ED : given solumedrol IVP    son GEORGETTE : [phone]                      [phone]  son CORBIN : [phone]    GEORGETTE ALTMAN  [id number] (16 May 2024 15:15)      INFECTIOUS DISEASE HISTORY:  Hospital course-  ID consulted for antimicrobial recommendations.     Prior hospital charts reviewed [Yes]  Primary team notes reviewed [Yes]  Other consultant notes reviewed [Yes]    REVIEW OF SYSTEMS:  CONSTITUTIONAL: No fever or chills  HEENT: No sore throat  RESPIRATORY: No cough, no shortness of breath  CARDIOVASCULAR: No chest pain or palpitations  GASTROINTESTINAL: No abdominal or epigastric pain  GENITOURINARY: No dysuria  NEUROLOGICAL: No headache/dizziness  MSK: No joint pain, erythema, or swelling; no back pain  SKIN: No itching, rashes  All other ROS negative except noted above    PAST MEDICAL & SURGICAL HISTORY:  HTN (hypertension)      High cholesterol      CVA (cerebrovascular accident)      Advanced COPD      Colitis      Glaucoma      Anxiety      Implantable loop recorder present          SOCIAL HISTORY:  - No recent travel    FAMILY HISTORY:  Family history of anxiety disorder (Child)    FHx: breast cancer (Mother)    FHx: kidney cancer (Father)    ANTIMICROBIALS:  cefepime   IVPB 2000 every 12 hours      ANTIMICROBIALS (past 90 days):  MEDICATIONS  (STANDING):  azithromycin  IVPB   255 mL/Hr IV Intermittent (05-16-24 @ 18:28)    cefepime   IVPB   100 mL/Hr IV Intermittent (05-17-24 @ 12:49)    cefTRIAXone   IVPB   100 mL/Hr IV Intermittent (05-16-24 @ 22:39)        OTHER MEDS:   MEDICATIONS  (STANDING):  acetaminophen     Tablet .. 650 every 6 hours PRN  aspirin  chewable 81 daily  benzonatate 100 three times a day PRN  bisacodyl 5 every 12 hours PRN  budesonide 160 MICROgram(s)/formoterol 4.5 MICROgram(s) Inhaler 2 two times a day  busPIRone 10 <User Schedule>  busPIRone 20 <User Schedule>  dicyclomine 10 three times a day before meals PRN  enoxaparin Injectable 40 every 24 hours  lisinopril 10 daily  mirtazapine 7.5 daily  montelukast 10 daily  ondansetron Injectable 4 every 8 hours PRN  polyethylene glycol 3350 17 daily PRN  predniSONE   Tablet 20 daily  senna 2 at bedtime  tiotropium 2.5 MICROgram(s) Inhaler 2 daily      VITALS:  Vital Signs Last 24 Hrs  T(F): 97.8 (05-17-24 @ 12:52), Max: 98 (05-16-24 @ 14:18)    Vital Signs Last 24 Hrs  HR: 66 (05-17-24 @ 12:52) (64 - 81)  BP: 157/71 (05-17-24 @ 12:52) (136/75 - 157/71)  RR: 18 (05-17-24 @ 12:52)  SpO2: 94% (05-17-24 @ 12:52) (94% - 99%)  Wt(kg): --    EXAM:  GENERAL: Cachectic, on NC.   HEAD: No head lesions  NECK: Supple  CHEST/LUNG: Shallow breath sounds bilaterally  HEART: S1 S2  ABDOMEN: Soft, nontender  EXTREMITIES: No clubbing  NERVOUS SYSTEM: Alert and oriented to person  MSK: No joint erythema, swelling or pain  SKIN: No rashes or lesions, no superficial thrombophlebitis    Labs:                        10.3   9.55  )-----------( 222      ( 17 May 2024 09:30 )             32.8     05-17    141  |  105  |  34<H>  ----------------------------<  197<H>  4.1   |  25  |  0.7    Ca    8.1<L>      17 May 2024 09:30    TPro  5.7<L>  /  Alb  2.6<L>  /  TBili  0.2  /  DBili  <0.2  /  AST  72<H>  /  ALT  139<H>  /  AlkPhos  106  05-17      WBC Trend:  WBC Count: 9.55 (05-17-24 @ 09:30)  WBC Count: 7.40 (05-16-24 @ 09:41)      Auto Neutrophil #: 8.30 K/uL (05-17-24 @ 09:30)  Auto Neutrophil #: 6.59 K/uL (05-16-24 @ 09:41)  Band Neutrophils %: 8.0 % (05-16-24 @ 09:41)  Auto Neutrophil #: 6.49 K/uL (09-09-23 @ 09:25)      Creatine Trend:  Creatinine: 0.7 (05-17)  Creatinine: 0.6 (05-16)      Liver Biochemical Testing Trend:  Alanine Aminotransferase (ALT/SGPT): 139 *H* (05-17)  Alanine Aminotransferase (ALT/SGPT): 135 *H* (05-16)  Alanine Aminotransferase (ALT/SGPT): 14 (09-09)  Aspartate Aminotransferase (AST/SGOT): 72 (05-17-24 @ 09:30)  Aspartate Aminotransferase (AST/SGOT): 48 (05-16-24 @ 09:41)  Aspartate Aminotransferase (AST/SGOT): 19 (09-09-23 @ 09:25)  Bilirubin Total: 0.2 (05-17)  Bilirubin Direct: <0.2 (05-17)  Bilirubin Total: 0.4 (05-16)  Bilirubin Total: 0.3 (09-09)      Trend LDH      Auto Eosinophil %: 0.0 % (05-17-24 @ 09:30)  Auto Eosinophil %: 0.0 % (05-16-24 @ 09:41)      Urinalysis Basic - ( 17 May 2024 09:30 )    Color: x / Appearance: x / SG: x / pH: x  Gluc: 197 mg/dL / Ketone: x  / Bili: x / Urobili: x   Blood: x / Protein: x / Nitrite: x   Leuk Esterase: x / RBC: x / WBC x   Sq Epi: x / Non Sq Epi: x / Bacteria: x        MICROBIOLOGY:    Female    Urinalysis with Rflx Culture (collected 16 May 2024 12:25)    Culture - Blood (collected 16 May 2024 09:40)  Source: .Blood Blood-Peripheral  Gram Stain (17 May 2024 08:09):    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 bottle: Gram Negative Rods    Growth in anaerobic bottle: Gram Negative Rods  Preliminary Report (17 May 2024 08:09):    Growth in anaerobic bottle: Gram Negative Rods    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 bottle: Gram Negative Rods    Direct identification is available within approximately 3-5    hours either by Blood Panel Multiplexed PCR or Direct    MALDI-TOF. Details: [email or web address]  Organism: Blood Culture PCR (17 May 2024 08:53)  Organism: Blood Culture PCR (17 May 2024 08:53)      Method Type: PCR      -  Enterobacterales: Detec    Culture - Blood (collected 16 May 2024 09:40)  Source: .Blood Blood-Peripheral  Gram Stain (17 May 2024 08:12):    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 bottle: Gram Negative Rods  Preliminary Report (17 May 2024 08:12):    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 bottle: Gram Negative Rods    Troponin T, High Sensitivity Result: 33 (05-16)  Troponin T, High Sensitivity Result: 36 (05-16)    Lactate, Blood: 3.3 (05-17 @ 09:30)  Lactate, Blood: 1.7 (05-16 @ 09:41)  Blood Gas Venous - Lactate: 2.1 (05-16 @ 09:34)        INFLAMMATORY MARKERS      RADIOLOGY & ADDITIONAL TESTS:  I have personally reviewed the imagings.  CXR      CT  < from: US Abdomen Upper Quadrant Right (05.17.24 @ 12:09) >    FINDINGS:  Liver: Within normal limits.  Bile ducts: Normal caliber. Common bile duct measures 6 mm.  Gallbladder: Bladder is poorly distended with no definite stones   identified.  Pancreas: Visualized portions are within normal limits.  Right kidney: 9.2 cm. No hydronephrosis.  Ascites: None.  IVC: Visualized portions are within normal limits.    Visualized aorta is atherosclerotic measuring up to 1.7 cm proximally.   Distal portion well-visualized.    IMPRESSION:  Limited by excessive bowel gas. Gallbladder collapsed with no definite   stones. No significant biliary ductal dilatation.        --- End of Report ---        < end of copied text >  < from: CT Head No Cont (05.16.24 @ 10:02) >  paranasal sinuses and mastoid complexes appear free of acute disease.    IMPRESSION:  No acute intracranial pathology.    Mild chronic microvascular type changes.    --- End of Report ---      < end of copied text >  < from: Xray Chest 1 View-PORTABLE IMMEDIATE (Xray Chest 1 View-PORTABLE IMMEDIATE .) (05.16.24 @ 09:40) >  INTERPRETATION:  Clinical History / Reason for exam: Short of breath    Comparison : Chest radiograph June 22, 2022.    Technique/Positioning: Frontal view of the chest.    Findings:    Support devices: Overlying Loop recorder.    Cardiac/mediastinum/hilum: No change    Lung parenchyma/Pleura: Interstitial opacities. Left basilar   opacity/effusion.    Skeleton/soft tissues: Degenerative changes    Impression:    Interstitial opacities. Left basilar opacity/effusion.        --- End of Report ---      < end of copied text >      CARDIOLOGY TESTING  12 Lead ECG:   Ventricular Rate 90 BPM    Atrial Rate 90 BPM    P-R Interval 138 ms    QRS Duration 84 ms    Q-T Interval 344 ms    QTC Calculation(Bazett) 420 ms    P Axis 84 degrees    R Axis -51 degrees    T Axis 10 degrees    Diagnosis Line Normal sinus rhythm  Left axis deviation  Low voltage QRS  Cannot rule out Anterior infarct , age undetermined  Abnormal ECG    Confirmed by Galen Goodman ([id number]) on 5/16/2024 4:43:12 PM (05-16-24 @ 16:06)  12 Lead ECG:   Ventricular Rate 104 BPM    Atrial Rate 100 BPM    QRS Duration 82 ms    Q-T Interval 284 ms    QTC Calculation(Bazett) 373 ms    R Axis -36 degrees    T Axis 6 degrees    Diagnosis Line Atrial fibrillation with rapid ventricular response  Left axis deviation  Possible Lateral infarct , age undetermined  Abnormal ECG    Confirmed by Galen Goodman ([id number]) on 5/16/2024 12:52:25 PM (05-16-24 @ 10:38)

## 2024-05-17 NOTE — CONSULT NOTE ADULT - ASSESSMENT
Impression:  copd doubt exacerbation   bacteremia gram negative   chronic hypoxic resp failure   ?? hypercapnia         Plan:  DO ABG check pco2 if above 50 ?? candidate for NIV   keep pox >88%   repeat cxr teagan   continue home inhaler   consider switch rocephine to cefepime   follow bld cx   u cx   ID consult   check procal   case discussed with hospitalist   do renal and RUQ  US

## 2024-05-17 NOTE — OCCUPATIONAL THERAPY INITIAL EVALUATION ADULT - PERTINENT HX OF CURRENT PROBLEM, REHAB EVAL
Patient is a poor historian. HPI obtained from  and two sons.      Patient is a 78 year old  female brought in by ambulance today for an episode of unresponsiveness and increased confusion this morning.  Family states that the patient has been taking Prednisone since January for COPD and IBS and attributes the change in mental status to a side effect of the steroid.  Patient was disoriented and confused this morning and could not respond to her son.  Patient also has been eating less and has needed to be carried upstairs and on/off the toilet an increasing amount of times since the winter.  Patient's confusion was in relation to daily routines rather than forgetting names and places.  Patient has increased tiredness and weakness/lack of energy - not being able to keep her head up, per son.  The symptoms are worse when the patient is tired, in the morning and at night.      CT Head: No acute intracranial pathology. Mild chronic microvascular type changes.

## 2024-05-17 NOTE — PROGRESS NOTE ADULT - ASSESSMENT
78 year old  female brought in by ambulance today for an episode of unresponsiveness and increased confusion this morning.  Family states that the patient has been taking Prednisone since January for COPD and IBS and attributes the change in mental status to a side effect of the steroid.  Patient was disoriented and confused this morning and could not respond to her son.  Patient also has been eating less and has needed to be carried upstairs and on/off the toilet an increasing amount of times since the winter.  Patient's confusion was in relation to daily routines rather than forgetting names and places.  Patient has increased tiredness and weakness/lack of energy - not being able to keep her head up, per son.  The symptoms are worse when the patient is tired, in the morning and at night.      # metabolic encephalopathy most likely 2/2 gram negative bacteremia   CT head negative.   No clear source of infection.   Will obtain CT abd/pelvis to localize the source.   ID consulted, recommended Cefepime 2g q12h.   follow sensitivity   follow Echo to rule out vegetation.     # COPD on home o2  - pulm consult; change in mental status unlikely due to COPD.   - Douneb q4h.   - Spiriva   - Budesonide   - prednisone 20 mg daily   - o2   2L- 3L liters - home dose   - c/w Spiriva / singular / Advair ( symbicort )   - c/w home pred dose     # Anemia with macrocytosis   hgb stable   - folate and b12 levels are normal.      # IBS- constipation   - miralax / senna/ dulculax   - tap water enema prn   - c/w bentyl     # anxiety / MDD  - c/w home remeron / busapr     # HTn   - c/w acei  - low salt diet     DNR/ DNI   molst in chart

## 2024-05-17 NOTE — CONSULT NOTE ADULT - SUBJECTIVE AND OBJECTIVE BOX
Patient is a 78y old  Female who presents with a chief complaint of Altered mental status     (16 May 2024 22:19)      HPI:  Patient is a poor historian. HPI obtained from  and two sons.      Patient is a 78 year old  female brought in by ambulance today for an episode of unresponsiveness and increased confusion this morning.  Family states that the patient has been taking Prednisone since January for COPD and IBS and attributes the change in mental status to a side effect of the steroid.  Patient was disoriented and confused this morning and could not respond to her son.  Patient also has been eating less and has needed to be carried upstairs and on/off the toilet an increasing amount of times since the winter.  Patient's confusion was in relation to daily routines rather than forgetting names and places.  Patient has increased tiredness and weakness/lack of energy - not being able to keep her head up, per son.  The symptoms are worse when the patient is tired, in the morning and at night.  Patient needs to be handled by son.  Nothing makes the symptoms better or worse.  Son attributes the symptoms to a lack of nutrition due to patients fear of eating pains and bathroom pains due to Colitis and IBS.      this morning she is awake   as per son back to her baseline    PAST MEDICAL & SURGICAL HISTORY:  HTN (hypertension)      High cholesterol      CVA (cerebrovascular accident)      Advanced COPD      Colitis      Glaucoma      Anxiety      Implantable loop recorder present          FAMILY HISTORY:  Family history of anxiety disorder (Child)    FHx: breast cancer (Mother)    FHx: kidney cancer (Father)      Family history: No family cardiovascular system   Occupation:  Alochol: Denied  Smoking: Denied  Drug Use: Denied  Marital Status:           Allergies    penicillins (Unknown)    Intolerances        Home Medications:  Advair Diskus 250 mcg-50 mcg inhalation powder: 1 puff(s) inhaled 2 times a day (16 May 2024 10:06)  benzonatate 100 mg oral capsule: 1 cap(s) orally 3 times a day (16 May 2024 10:06)  BuSpar 10 mg oral tablet: 2 tab(s) orally once a day (16 May 2024 15:46)  BuSpar 10 mg oral tablet: 1 tab(s) orally 2 times a day (16 May 2024 15:47)  dorzolamide-timolol 2%-0.5% preservative-free ophthalmic solution: 1 drop(s) to each affected eye 2 times a day (16 May 2024 10:06)  lisinopril 10 mg oral tablet: 1 tab(s) orally once a day (16 May 2024 10:06)  mirtazapine 7.5 mg oral tablet: 1 tab(s) orally once a day (16 May 2024 15:47)  montelukast 10 mg oral tablet: 1 tab(s) orally once a day (16 May 2024 10:06)  predniSONE 20 mg oral tablet: 2 tab(s) orally once a day (16 May 2024 15:47)  Prinivil 10 mg oral tablet: 1 tab(s) orally once a day (16 May 2024 15:47)  Spiriva 18 mcg inhalation capsule: 1 cap(s) inhaled once a day (16 May 2024 15:47)  Vitamin C 500 mg oral tablet: 1 tab(s) orally once a day (16 May 2024 10:06)  Vitamin D3 50 mcg (2000 intl units) oral tablet: 1 tab(s) orally once a day (16 May 2024 10:06)      ROS: as in HPI; All other systems reviewed are negative        PHYSICAL EXAM:  Vital Signs Last 24 Hrs  T(C): 36.6 (17 May 2024 05:15), Max: 36.7 (16 May 2024 14:18)  T(F): 97.8 (17 May 2024 05:15), Max: 98 (16 May 2024 14:18)  HR: 64 (17 May 2024 05:15) (64 - 92)  BP: 138/68 (17 May 2024 05:15) (113/75 - 162/84)  BP(mean): --  RR: 18 (17 May 2024 05:15) (18 - 18)  SpO2: 99% (17 May 2024 05:15) (92% - 99%)    Parameters below as of 17 May 2024 05:15  Patient On (Oxygen Delivery Method): nasal cannula  O2 Flow (L/min): 3        GENERAL: NAD, well-groomed, well-developed  HEAD:  Atraumatic, Normocephalic    NECK: Supple, No JVD, Normal thyroid  NERVOUS SYSTEM:  Alert & Oriented X3, Good concentration; Motor Strength 5/5 B/L upper and lower extremities; DTRs 2+ intact and symmetric  CHEST/LUNG: Clear to percussion bilaterally; No rales, rhonchi, wheezing, or rubs  HEART: Regular rate and rhythm; No murmurs, rubs, or gallops  ABDOMEN: Soft, Nontender, Nondistended; Bowel sounds present      HOSPITAL MEDICATIONS:  MEDICATIONS  (STANDING):  ascorbic acid 500 milliGRAM(s) Oral daily  aspirin  chewable 81 milliGRAM(s) Oral daily  azithromycin  IVPB 500 milliGRAM(s) IV Intermittent every 24 hours  budesonide 160 MICROgram(s)/formoterol 4.5 MICROgram(s) Inhaler 2 Puff(s) Inhalation two times a day  busPIRone 10 milliGRAM(s) Oral <User Schedule>  busPIRone 20 milliGRAM(s) Oral <User Schedule>  cefTRIAXone   IVPB 1000 milliGRAM(s) IV Intermittent every 24 hours  dorzolamide 2%/timolol 0.5% Ophthalmic Solution 1 Drop(s) Both EYES two times a day  enoxaparin Injectable 40 milliGRAM(s) SubCutaneous every 24 hours  lisinopril 10 milliGRAM(s) Oral daily  mirtazapine 7.5 milliGRAM(s) Oral daily  montelukast 10 milliGRAM(s) Oral daily  predniSONE   Tablet 20 milliGRAM(s) Oral daily  senna 2 Tablet(s) Oral at bedtime  sodium chloride 0.9%. 1000 milliLiter(s) (50 mL/Hr) IV Continuous <Continuous>  tiotropium 2.5 MICROgram(s) Inhaler 2 Puff(s) Inhalation daily    MEDICATIONS  (PRN):  acetaminophen     Tablet .. 650 milliGRAM(s) Oral every 6 hours PRN Temp greater or equal to 38C (100.4F), Mild Pain (1 - 3)  benzonatate 100 milliGRAM(s) Oral three times a day PRN Cough  bisacodyl 5 milliGRAM(s) Oral every 12 hours PRN Constipation  dicyclomine 10 milliGRAM(s) Oral three times a day before meals PRN abd cramps  ondansetron Injectable 4 milliGRAM(s) IV Push every 8 hours PRN Nausea and/or Vomiting  polyethylene glycol 3350 17 Gram(s) Oral daily PRN Constipation      LABS:                        10.3   9.55  )-----------( 222      ( 17 May 2024 09:30 )             32.8     05-17    141  |  105  |  34<H>  ----------------------------<  197<H>  4.1   |  25  |  0.7    Ca    8.1<L>      17 May 2024 09:30    TPro  5.7<L>  /  Alb  2.6<L>  /  TBili  0.2  /  DBili  <0.2  /  AST  72<H>  /  ALT  139<H>  /  AlkPhos  106  05-17    PT/INR - ( 16 May 2024 09:41 )   PT: 10.50 sec;   INR: 0.92 ratio         PTT - ( 16 May 2024 09:41 )  PTT:21.0 sec  Urinalysis Basic - ( 17 May 2024 09:30 )    Color: x / Appearance: x / SG: x / pH: x  Gluc: 197 mg/dL / Ketone: x  / Bili: x / Urobili: x   Blood: x / Protein: x / Nitrite: x   Leuk Esterase: x / RBC: x / WBC x   Sq Epi: x / Non Sq Epi: x / Bacteria: x        Venous Blood Gas:  05-16 @ 09:34  7.36/65/10/37/13.2  VBG Lactate: 2.1        Urinalysis with Rflx Culture (collected 16 May 2024 12:25)    Culture - Blood (collected 16 May 2024 09:40)  Source: .Blood Blood-Peripheral  Gram Stain (17 May 2024 08:09):    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 bottle: Gram Negative Rods    Growth in anaerobic bottle: Gram Negative Rods  Preliminary Report (17 May 2024 08:09):    Growth in anaerobic bottle: Gram Negative Rods    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 bottle: Gram Negative Rods    Direct identification is available within approximately 3-5    hours either by Blood Panel Multiplexed PCR or Direct    MALDI-TOF. Details: [email or web address]  Organism: Blood Culture PCR (17 May 2024 08:53)  Organism: Blood Culture PCR (17 May 2024 08:53)    Culture - Blood (collected 16 May 2024 09:40)  Source: .Blood Blood-Peripheral  Gram Stain (17 May 2024 08:12):    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 bottle: Gram Negative Rods  Preliminary Report (17 May 2024 08:12):    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 bottle: Gram Negative Rods        RADIOLOGY:bibasilar opacity / atelectasis   [ ] Reviewed and interpreted by me    ECHO:    Point of Care Ultrasound Findings;    PFT:

## 2024-05-17 NOTE — PHYSICAL THERAPY INITIAL EVALUATION ADULT - PERTINENT HX OF CURRENT PROBLEM, REHAB EVAL
Reason for Admission: increased confusion, altered mental status  History of Present Illness:   Patient is a poor historian. HPI obtained from  and two sons.      Patient is a 78 year old  female brought in by ambulance today for an episode of unresponsiveness and increased confusion this morning.  Family states that the patient has been taking Prednisone since January for COPD and IBS and attributes the change in mental status to a side effect of the steroid.  Patient was disoriented and confused this morning and could not respond to her son.  Patient also has been eating less and has needed to be carried upstairs and on/off the toilet an increasing amount of times since the winter.  Patient's confusion was in relation to daily routines rather than forgetting names and places.  Patient has increased tiredness and weakness/lack of energy - not being able to keep her head up, per son.  The symptoms are worse when the patient is tired, in the morning and at night.  Patient needs to be handled by son.  Nothing makes the symptoms better or worse.  Son attributes the symptoms to a lack of nutrition due to patients fear of eating pains and bathroom pains due to Colitis and IBS.

## 2024-05-17 NOTE — PHYSICAL THERAPY INITIAL EVALUATION ADULT - GENERAL OBSERVATIONS, REHAB EVAL
10:04-10:33 Chart reviewed. Patient available to be seen for physical therapy, denies pain, confirmed with RN.  Pt rec'd in bed + Primafit, + 3L nasal O2, son and  and Samson SOW present, pt in NAD.

## 2024-05-17 NOTE — PHYSICAL THERAPY INITIAL EVALUATION ADULT - WEIGHT-BEARING RESTRICTIONS: GAIT, REHAB EVAL
Left lower back pain since yesterday while bike riding says pain  Goes   Down to left knee no trauma   full weight-bearing

## 2024-05-17 NOTE — PHYSICAL THERAPY INITIAL EVALUATION ADULT - ADDITIONAL COMMENTS
As per son, pt relies on son to pick her up for all mobility and transferring activities. Pt was not answering any questions or engaging with PT during tx session, unless I asked her to 'look at me' and try to answer and speak to me. Son was against pt going to any form of rehab, despite me educating pt and family of importance of having daily rehab.

## 2024-05-17 NOTE — PHYSICAL THERAPY INITIAL EVALUATION ADULT - TRANSFER SAFETY CONCERNS NOTED: SIT/STAND, REHAB EVAL
decreased safety awareness/decreased proprioception/decreased sequencing ability/decreased step length/decreased weight-shifting ability

## 2024-05-17 NOTE — OCCUPATIONAL THERAPY INITIAL EVALUATION ADULT - ADDITIONAL COMMENTS
PLOF obtained from spouse and sons. Pt was ambulating and going out of the house Jan-Feb. As per spouse, around March patient started becoming weaker and more fearful ambulating. Then as per chart review of PT IE as per second son pt relies on son to pick her up for all mobility and transferring activities.     Spouse states there is a RW and a transport chair in the home. As per second son Pt does not like commodes at bedside so son carries patient to the toilet in their home.

## 2024-05-17 NOTE — OCCUPATIONAL THERAPY INITIAL EVALUATION ADULT - GENERAL OBSERVATIONS, REHAB EVAL
14:10-14;50 Chart reviewed, ok to treat by Occupational Therapist as confirmed by RN Samson, Pt received semi-Carlos's in bed (+) Heplock LUE (+) O2 3L via NC (+) Prima fit in NAD. Pt in agreement with OT IE.

## 2024-05-17 NOTE — OCCUPATIONAL THERAPY INITIAL EVALUATION ADULT - LEVEL OF INDEPENDENCE: TUB, REHAB EVAL
not appropriate to assess at this time; did discuss with second son and spouse the possibility of using a tub transfer bench for bathing when it's appropriate. Both demo good understandung

## 2024-05-17 NOTE — OCCUPATIONAL THERAPY INITIAL EVALUATION ADULT - LIVES WITH, PROFILE
hand  and youngest son in a private house 4 steps to enter (R) Handrail; 1 flight with stair lift to the bedroom; (+) bathtub (+) standard toilet/children/spouse

## 2024-05-17 NOTE — PHYSICAL THERAPY INITIAL EVALUATION ADULT - IMPAIRMENTS CONTRIBUTING TO GAIT DEVIATIONS, PT EVAL
impaired balance/impaired coordination/decreased flexibility/impaired postural control/impaired sensory feedback/decreased strength

## 2024-05-17 NOTE — PHYSICAL THERAPY INITIAL EVALUATION ADULT - PATIENT/FAMILY/SIGNIFICANT OTHER GOALS STATEMENT, PT EVAL
Pt did not offer any goals, but son seemed to be making all the decisions, including discouraging pt from going to rehab, despite education provided to family.

## 2024-05-18 LAB
-  AMPICILLIN/SULBACTAM: SIGNIFICANT CHANGE UP
-  AMPICILLIN: SIGNIFICANT CHANGE UP
-  AZTREONAM: SIGNIFICANT CHANGE UP
-  CEFAZOLIN: SIGNIFICANT CHANGE UP
-  CEFEPIME: SIGNIFICANT CHANGE UP
-  CEFOXITIN: SIGNIFICANT CHANGE UP
-  CEFTRIAXONE: SIGNIFICANT CHANGE UP
-  CIPROFLOXACIN: SIGNIFICANT CHANGE UP
-  ERTAPENEM: SIGNIFICANT CHANGE UP
-  GENTAMICIN: SIGNIFICANT CHANGE UP
-  IMIPENEM: SIGNIFICANT CHANGE UP
-  LEVOFLOXACIN: SIGNIFICANT CHANGE UP
-  MEROPENEM: SIGNIFICANT CHANGE UP
-  PIPERACILLIN/TAZOBACTAM: SIGNIFICANT CHANGE UP
-  TOBRAMYCIN: SIGNIFICANT CHANGE UP
-  TRIMETHOPRIM/SULFAMETHOXAZOLE: SIGNIFICANT CHANGE UP
ALBUMIN SERPL ELPH-MCNC: 2.7 G/DL — LOW (ref 3.5–5.2)
ALP SERPL-CCNC: 121 U/L — HIGH (ref 30–115)
ALT FLD-CCNC: 222 U/L — HIGH (ref 0–41)
ANION GAP SERPL CALC-SCNC: 11 MMOL/L — SIGNIFICANT CHANGE UP (ref 7–14)
AST SERPL-CCNC: 89 U/L — HIGH (ref 0–41)
BILIRUB SERPL-MCNC: 0.3 MG/DL — SIGNIFICANT CHANGE UP (ref 0.2–1.2)
BUN SERPL-MCNC: 35 MG/DL — HIGH (ref 10–20)
CALCIUM SERPL-MCNC: 8.2 MG/DL — LOW (ref 8.4–10.5)
CHLORIDE SERPL-SCNC: 105 MMOL/L — SIGNIFICANT CHANGE UP (ref 98–110)
CO2 SERPL-SCNC: 22 MMOL/L — SIGNIFICANT CHANGE UP (ref 17–32)
CREAT SERPL-MCNC: 0.7 MG/DL — SIGNIFICANT CHANGE UP (ref 0.7–1.5)
CULTURE RESULTS: ABNORMAL
EGFR: 88 ML/MIN/1.73M2 — SIGNIFICANT CHANGE UP
GLUCOSE SERPL-MCNC: 166 MG/DL — HIGH (ref 70–99)
HCT VFR BLD CALC: 36.4 % — LOW (ref 37–47)
HCV AB S/CO SERPL IA: 0.08 COI — SIGNIFICANT CHANGE UP
HCV AB SERPL-IMP: SIGNIFICANT CHANGE UP
HGB BLD-MCNC: 11.5 G/DL — LOW (ref 12–16)
LDH SERPL L TO P-CCNC: 536 — HIGH (ref 50–242)
MAGNESIUM SERPL-MCNC: 1.9 MG/DL — SIGNIFICANT CHANGE UP (ref 1.8–2.4)
MCHC RBC-ENTMCNC: 31.6 G/DL — LOW (ref 32–37)
MCHC RBC-ENTMCNC: 31.8 PG — HIGH (ref 27–31)
MCV RBC AUTO: 100.6 FL — HIGH (ref 81–99)
METHOD TYPE: SIGNIFICANT CHANGE UP
NRBC # BLD: 0 /100 WBCS — SIGNIFICANT CHANGE UP (ref 0–0)
ORGANISM # SPEC MICROSCOPIC CNT: ABNORMAL
ORGANISM # SPEC MICROSCOPIC CNT: ABNORMAL
ORGANISM # SPEC MICROSCOPIC CNT: SIGNIFICANT CHANGE UP
PLATELET # BLD AUTO: 200 K/UL — SIGNIFICANT CHANGE UP (ref 130–400)
PMV BLD: 9.4 FL — SIGNIFICANT CHANGE UP (ref 7.4–10.4)
POTASSIUM SERPL-MCNC: 4.2 MMOL/L — SIGNIFICANT CHANGE UP (ref 3.5–5)
POTASSIUM SERPL-SCNC: 4.2 MMOL/L — SIGNIFICANT CHANGE UP (ref 3.5–5)
PROCALCITONIN SERPL-MCNC: 0.92 NG/ML — HIGH (ref 0.02–0.1)
PROT SERPL-MCNC: 5.9 G/DL — LOW (ref 6–8)
RBC # BLD: 3.62 M/UL — LOW (ref 4.2–5.4)
RBC # FLD: 15.1 % — HIGH (ref 11.5–14.5)
SODIUM SERPL-SCNC: 138 MMOL/L — SIGNIFICANT CHANGE UP (ref 135–146)
SPECIMEN SOURCE: SIGNIFICANT CHANGE UP
WBC # BLD: 10.97 K/UL — HIGH (ref 4.8–10.8)
WBC # FLD AUTO: 10.97 K/UL — HIGH (ref 4.8–10.8)

## 2024-05-18 PROCEDURE — 71045 X-RAY EXAM CHEST 1 VIEW: CPT | Mod: 26

## 2024-05-18 PROCEDURE — 99232 SBSQ HOSP IP/OBS MODERATE 35: CPT

## 2024-05-18 RX ORDER — GUAIFENESIN/DEXTROMETHORPHAN 600MG-30MG
5 TABLET, EXTENDED RELEASE 12 HR ORAL EVERY 8 HOURS
Refills: 0 | Status: DISCONTINUED | OUTPATIENT
Start: 2024-05-18 | End: 2024-05-23

## 2024-05-18 RX ORDER — SODIUM CHLORIDE 9 MG/ML
1000 INJECTION INTRAMUSCULAR; INTRAVENOUS; SUBCUTANEOUS
Refills: 0 | Status: DISCONTINUED | OUTPATIENT
Start: 2024-05-18 | End: 2024-05-21

## 2024-05-18 RX ORDER — LANOLIN ALCOHOL/MO/W.PET/CERES
3 CREAM (GRAM) TOPICAL AT BEDTIME
Refills: 0 | Status: DISCONTINUED | OUTPATIENT
Start: 2024-05-18 | End: 2024-05-23

## 2024-05-18 RX ADMIN — MIRTAZAPINE 7.5 MILLIGRAM(S): 45 TABLET, ORALLY DISINTEGRATING ORAL at 11:09

## 2024-05-18 RX ADMIN — Medication 20 MILLIGRAM(S): at 06:21

## 2024-05-18 RX ADMIN — Medication 10 MILLIGRAM(S): at 18:08

## 2024-05-18 RX ADMIN — Medication 500 MILLIGRAM(S): at 11:09

## 2024-05-18 RX ADMIN — LISINOPRIL 10 MILLIGRAM(S): 2.5 TABLET ORAL at 06:21

## 2024-05-18 RX ADMIN — Medication 650 MILLIGRAM(S): at 00:46

## 2024-05-18 RX ADMIN — Medication 650 MILLIGRAM(S): at 12:07

## 2024-05-18 RX ADMIN — TIOTROPIUM BROMIDE 2 PUFF(S): 18 CAPSULE ORAL; RESPIRATORY (INHALATION) at 11:08

## 2024-05-18 RX ADMIN — SODIUM CHLORIDE 75 MILLILITER(S): 9 INJECTION INTRAMUSCULAR; INTRAVENOUS; SUBCUTANEOUS at 19:35

## 2024-05-18 RX ADMIN — Medication 650 MILLIGRAM(S): at 11:44

## 2024-05-18 RX ADMIN — BUDESONIDE AND FORMOTEROL FUMARATE DIHYDRATE 2 PUFF(S): 160; 4.5 AEROSOL RESPIRATORY (INHALATION) at 20:50

## 2024-05-18 RX ADMIN — BUDESONIDE AND FORMOTEROL FUMARATE DIHYDRATE 2 PUFF(S): 160; 4.5 AEROSOL RESPIRATORY (INHALATION) at 11:17

## 2024-05-18 RX ADMIN — Medication 81 MILLIGRAM(S): at 11:09

## 2024-05-18 RX ADMIN — MONTELUKAST 10 MILLIGRAM(S): 4 TABLET, CHEWABLE ORAL at 11:08

## 2024-05-18 RX ADMIN — CEFEPIME 100 MILLIGRAM(S): 1 INJECTION, POWDER, FOR SOLUTION INTRAMUSCULAR; INTRAVENOUS at 17:02

## 2024-05-18 RX ADMIN — Medication 20 MILLIGRAM(S): at 06:26

## 2024-05-18 RX ADMIN — ENOXAPARIN SODIUM 40 MILLIGRAM(S): 100 INJECTION SUBCUTANEOUS at 17:14

## 2024-05-18 RX ADMIN — Medication 10 MILLIGRAM(S): at 11:09

## 2024-05-18 RX ADMIN — Medication 3 MILLIGRAM(S): at 00:46

## 2024-05-18 RX ADMIN — Medication 650 MILLIGRAM(S): at 18:44

## 2024-05-18 RX ADMIN — Medication 5 MILLILITER(S): at 10:02

## 2024-05-18 RX ADMIN — SODIUM CHLORIDE 75 MILLILITER(S): 9 INJECTION INTRAMUSCULAR; INTRAVENOUS; SUBCUTANEOUS at 14:12

## 2024-05-18 RX ADMIN — CEFEPIME 100 MILLIGRAM(S): 1 INJECTION, POWDER, FOR SOLUTION INTRAMUSCULAR; INTRAVENOUS at 06:21

## 2024-05-18 RX ADMIN — SENNA PLUS 2 TABLET(S): 8.6 TABLET ORAL at 22:14

## 2024-05-18 NOTE — PROGRESS NOTE ADULT - ASSESSMENT
78 year old  female brought in by ambulance today for an episode of unresponsiveness and increased confusion this morning.  Family states that the patient has been taking Prednisone since January for COPD and IBS and attributes the change in mental status to a side effect of the steroid.  Patient was disoriented and confused this morning and could not respond to her son.  Patient also has been eating less and has needed to be carried upstairs and on/off the toilet an increasing amount of times since the winter.  Patient's confusion was in relation to daily routines rather than forgetting names and places.  Patient has increased tiredness and weakness/lack of energy - not being able to keep her head up, per son.  The symptoms are worse when the patient is tired, in the morning and at night.      # metabolic encephalopathy most likely 2/2 gram negative bacteremia   CT head negative.   No clear source of infection.   Will obtain CT abd/pelvis to localize the source.   ID consulted, recommended Cefepime 2g q12h.   follow sensitivity   follow Echo to rule out vegetation.     # COPD on home o2  - pulm consult; change in mental status unlikely due to COPD.   - Douneb q4h.   - Spiriva   - Budesonide   - prednisone 20 mg daily for 5 days.   - o2   2L- 3L liters - home dose   - c/w Spiriva / singular / Advair ( symbicort )   - c/w home pred dose     # Anemia with macrocytosis   hgb stable   - folate and b12 levels are normal.      # IBS- constipation   - Miralax / senna/ Dulcolax   - tap water enema prn   - c/w bentyl     # anxiety / MDD  - c/w home Remeron / Buspar     # HTn   - c/w acei  - low salt diet     DNR/ DNI   molst in chart

## 2024-05-19 LAB
ALBUMIN SERPL ELPH-MCNC: 2.3 G/DL — LOW (ref 3.5–5.2)
ALP SERPL-CCNC: 147 U/L — HIGH (ref 30–115)
ALT FLD-CCNC: 207 U/L — HIGH (ref 0–41)
ANION GAP SERPL CALC-SCNC: 9 MMOL/L — SIGNIFICANT CHANGE UP (ref 7–14)
AST SERPL-CCNC: 64 U/L — HIGH (ref 0–41)
BILIRUB SERPL-MCNC: <0.2 MG/DL — SIGNIFICANT CHANGE UP (ref 0.2–1.2)
BUN SERPL-MCNC: 39 MG/DL — HIGH (ref 10–20)
CALCIUM SERPL-MCNC: 8.3 MG/DL — LOW (ref 8.4–10.5)
CHLORIDE SERPL-SCNC: 108 MMOL/L — SIGNIFICANT CHANGE UP (ref 98–110)
CO2 SERPL-SCNC: 25 MMOL/L — SIGNIFICANT CHANGE UP (ref 17–32)
CREAT SERPL-MCNC: 0.9 MG/DL — SIGNIFICANT CHANGE UP (ref 0.7–1.5)
CRYPTOC AG FLD QL: NEGATIVE — SIGNIFICANT CHANGE UP
EGFR: 65 ML/MIN/1.73M2 — SIGNIFICANT CHANGE UP
GLUCOSE SERPL-MCNC: 200 MG/DL — HIGH (ref 70–99)
HBV CORE AB SER-ACNC: SIGNIFICANT CHANGE UP
HBV SURFACE AB SER-ACNC: SIGNIFICANT CHANGE UP
HBV SURFACE AG SER-ACNC: SIGNIFICANT CHANGE UP
HCT VFR BLD CALC: 32.3 % — LOW (ref 37–47)
HGB BLD-MCNC: 9.9 G/DL — LOW (ref 12–16)
HIV 1+2 AB+HIV1 P24 AG SERPL QL IA: SIGNIFICANT CHANGE UP
MAGNESIUM SERPL-MCNC: 2 MG/DL — SIGNIFICANT CHANGE UP (ref 1.8–2.4)
MCHC RBC-ENTMCNC: 30.7 G/DL — LOW (ref 32–37)
MCHC RBC-ENTMCNC: 31 PG — SIGNIFICANT CHANGE UP (ref 27–31)
MCV RBC AUTO: 101.3 FL — HIGH (ref 81–99)
NRBC # BLD: 0 /100 WBCS — SIGNIFICANT CHANGE UP (ref 0–0)
PLATELET # BLD AUTO: 213 K/UL — SIGNIFICANT CHANGE UP (ref 130–400)
PMV BLD: 9.9 FL — SIGNIFICANT CHANGE UP (ref 7.4–10.4)
POTASSIUM SERPL-MCNC: 4.2 MMOL/L — SIGNIFICANT CHANGE UP (ref 3.5–5)
POTASSIUM SERPL-SCNC: 4.2 MMOL/L — SIGNIFICANT CHANGE UP (ref 3.5–5)
PROT SERPL-MCNC: 5.6 G/DL — LOW (ref 6–8)
RBC # BLD: 3.19 M/UL — LOW (ref 4.2–5.4)
RBC # FLD: 15.1 % — HIGH (ref 11.5–14.5)
SODIUM SERPL-SCNC: 142 MMOL/L — SIGNIFICANT CHANGE UP (ref 135–146)
WBC # BLD: 12.37 K/UL — HIGH (ref 4.8–10.8)
WBC # FLD AUTO: 12.37 K/UL — HIGH (ref 4.8–10.8)

## 2024-05-19 PROCEDURE — 99232 SBSQ HOSP IP/OBS MODERATE 35: CPT

## 2024-05-19 RX ORDER — CEFTRIAXONE 500 MG/1
2000 INJECTION, POWDER, FOR SOLUTION INTRAMUSCULAR; INTRAVENOUS EVERY 24 HOURS
Refills: 0 | Status: DISCONTINUED | OUTPATIENT
Start: 2024-05-19 | End: 2024-05-23

## 2024-05-19 RX ORDER — TIMOLOL 0.5 %
1 DROPS OPHTHALMIC (EYE)
Refills: 0 | Status: DISCONTINUED | OUTPATIENT
Start: 2024-05-19 | End: 2024-05-23

## 2024-05-19 RX ORDER — MIRTAZAPINE 45 MG/1
7.5 TABLET, ORALLY DISINTEGRATING ORAL
Refills: 0 | Status: DISCONTINUED | OUTPATIENT
Start: 2024-05-19 | End: 2024-05-23

## 2024-05-19 RX ORDER — DORZOLAMIDE HYDROCHLORIDE 20 MG/ML
1 SOLUTION/ DROPS OPHTHALMIC THREE TIMES A DAY
Refills: 0 | Status: DISCONTINUED | OUTPATIENT
Start: 2024-05-19 | End: 2024-05-23

## 2024-05-19 RX ADMIN — Medication 81 MILLIGRAM(S): at 11:14

## 2024-05-19 RX ADMIN — CEFEPIME 100 MILLIGRAM(S): 1 INJECTION, POWDER, FOR SOLUTION INTRAMUSCULAR; INTRAVENOUS at 05:17

## 2024-05-19 RX ADMIN — LISINOPRIL 10 MILLIGRAM(S): 2.5 TABLET ORAL at 05:17

## 2024-05-19 RX ADMIN — Medication 20 MILLIGRAM(S): at 06:20

## 2024-05-19 RX ADMIN — Medication 10 MILLIGRAM(S): at 11:14

## 2024-05-19 RX ADMIN — Medication 500 MILLIGRAM(S): at 11:14

## 2024-05-19 RX ADMIN — MONTELUKAST 10 MILLIGRAM(S): 4 TABLET, CHEWABLE ORAL at 11:14

## 2024-05-19 RX ADMIN — ENOXAPARIN SODIUM 40 MILLIGRAM(S): 100 INJECTION SUBCUTANEOUS at 17:37

## 2024-05-19 RX ADMIN — SENNA PLUS 2 TABLET(S): 8.6 TABLET ORAL at 21:47

## 2024-05-19 RX ADMIN — Medication 3 MILLIGRAM(S): at 21:51

## 2024-05-19 RX ADMIN — DORZOLAMIDE HYDROCHLORIDE 1 DROP(S): 20 SOLUTION/ DROPS OPHTHALMIC at 13:07

## 2024-05-19 RX ADMIN — Medication 20 MILLIGRAM(S): at 05:17

## 2024-05-19 RX ADMIN — CEFTRIAXONE 100 MILLIGRAM(S): 500 INJECTION, POWDER, FOR SOLUTION INTRAMUSCULAR; INTRAVENOUS at 13:06

## 2024-05-19 RX ADMIN — DORZOLAMIDE HYDROCHLORIDE 1 DROP(S): 20 SOLUTION/ DROPS OPHTHALMIC at 21:46

## 2024-05-19 RX ADMIN — TIOTROPIUM BROMIDE 2 PUFF(S): 18 CAPSULE ORAL; RESPIRATORY (INHALATION) at 07:41

## 2024-05-19 RX ADMIN — Medication 10 MILLIGRAM(S): at 18:43

## 2024-05-19 RX ADMIN — SODIUM CHLORIDE 75 MILLILITER(S): 9 INJECTION INTRAMUSCULAR; INTRAVENOUS; SUBCUTANEOUS at 21:52

## 2024-05-19 RX ADMIN — Medication 1 DROP(S): at 17:37

## 2024-05-19 NOTE — CHART NOTE - NSCHARTNOTEFT_GEN_A_CORE
Registered Dietitian Follow-Up     Patient Profile Reviewed                           Yes []X   No []     Nutrition History Previously Obtained        Yes [X]  No []       Pertinent Information:     pt is 78 year old female with hx of COPD, IBS, colitis, CVA,  HTN p/w increased confusion and unresponsiveness admitted with failure to thrive acute vs chronic dementia? r/o infection  As per ID Enterobacterials Bacteremia-Life threatening     Diet, Regular:   Fiber/Residue Restricted (LOWFIBER)  DASH/TLC {Sodium & Cholesterol Restricted} (DASH)  Supplement Feeding Modality:  Oral  Ensure Enlive Cans or Servings Per Day:  1       Frequency:  Three Times a day (05-19-24 @ 12:05) [Active]    **In shanika of ensure pt is receiving Sealed Standard 1.0 shake twice daily and is tolerating well. Pt's son refuses to provide pt with anything whey based. He continues to limit her dietary intake based on foods he believes may aggravate her IBS. These restricted items have been forwarded to dietary to aid in providing the pt with enough options to help meet her nutrient needs.          Anthropometrics:  - Ht. 157.5 cm  - Wt.  40.5 kgs      05-19    142  |  108  |  39<H>  ----------------------------<  200<H>  4.2   |  25  |  0.9    Ca    8.3<L>      19 May 2024 15:39  Mg     2.0     05-19    TPro  5.6<L>  /  Alb  2.3<L>  /  TBili  <0.2  /  DBili  x   /  AST  64<H>  /  ALT  207<H>  /  AlkPhos  147<H>  05-19                            9.9    12.37 )-----------( 213      ( 19 May 2024 15:39 )             32.3       MEDICATIONS  (STANDING):  ascorbic acid 500 milliGRAM(s) Oral daily  aspirin  chewable 81 milliGRAM(s) Oral daily  budesonide 160 MICROgram(s)/formoterol 4.5 MICROgram(s) Inhaler 2 Puff(s) Inhalation two times a day  busPIRone 10 milliGRAM(s) Oral <User Schedule>  busPIRone 20 milliGRAM(s) Oral <User Schedule>  cefTRIAXone   IVPB 2000 milliGRAM(s) IV Intermittent every 24 hours  dorzolamide 2% Ophthalmic Solution 1 Drop(s) Both EYES three times a day  enoxaparin Injectable 40 milliGRAM(s) SubCutaneous every 24 hours  lisinopril 10 milliGRAM(s) Oral daily  melatonin 3 milliGRAM(s) Oral at bedtime  mirtazapine 7.5 milliGRAM(s) Oral <User Schedule>  montelukast 10 milliGRAM(s) Oral daily  predniSONE   Tablet 20 milliGRAM(s) Oral daily  senna 2 Tablet(s) Oral at bedtime  sodium chloride 0.9%. 1000 milliLiter(s) (75 mL/Hr) IV Continuous <Continuous>  timolol 0.5% Solution 1 Drop(s) Both EYES two times a day  tiotropium 2.5 MICROgram(s) Inhaler 2 Puff(s) Inhalation daily    MEDICATIONS  (PRN):  acetaminophen     Tablet .. 650 milliGRAM(s) Oral every 6 hours PRN Temp greater or equal to 38C (100.4F), Mild Pain (1 - 3)  benzonatate 100 milliGRAM(s) Oral three times a day PRN Cough  bisacodyl 5 milliGRAM(s) Oral every 12 hours PRN Constipation  dicyclomine 10 milliGRAM(s) Oral three times a day before meals PRN abd cramps  guaifenesin/dextromethorphan Oral Liquid 5 milliLiter(s) Oral every 8 hours PRN Cough  ondansetron Injectable 4 milliGRAM(s) IV Push every 8 hours PRN Nausea and/or Vomiting  polyethylene glycol 3350 17 Gram(s) Oral daily PRN Constipation       Physical Findings:  - Appearance: pt is far more alert today, seen sitting up in bed eating lunch surrounded by visitors  - GI function: +BS  - Tubes: n/a   - Oral/Mouth cavity:  - Skin: sacral spine unstagable      Nutrition Requirements  Weight Used:  40.5 kgs     Estimated Energy Needs   0869-8448 kcals (30-35 kcals/kg/BW)  49-57g protein (1.2-1.4g/kg/BW)  1;1 kcal for estimated fluid needs     Nutrient Intake: at lunch pt consumed 50% of tray and 50% of her [employer] shake. pt was requesting more food. Extended Family at bedside was also encouraging family to eat more        [] Previous Nutrition Diagnosis:            [X] Ongoing          [] Resolved    severe PCM remains     Nutrition Intervention: maintain on present diet with debra Listen [employer] shakes twice daily.     Goal/Expected Outcome: pt to continue to tolerate po diet and meet at least 50-75% of estimated needs within 3-5 days     Indicator/Monitoring: RD to continue to monitor tolerance to po diet, labs, NFPF and f/u as needed.

## 2024-05-19 NOTE — PROGRESS NOTE ADULT - ASSESSMENT
78 year old  female brought in by ambulance today for an episode of unresponsiveness and increased confusion this morning.  Family states that the patient has been taking Prednisone since January for COPD and IBS and attributes the change in mental status to a side effect of the steroid.  Patient was disoriented and confused this morning and could not respond to her son.  Patient also has been eating less and has needed to be carried upstairs and on/off the toilet an increasing amount of times since the winter.  Patient's confusion was in relation to daily routines rather than forgetting names and places.  Patient has increased tiredness and weakness/lack of energy - not being able to keep her head up, per son.  The symptoms are worse when the patient is tired, in the morning and at night.      # metabolic encephalopathy most likely 2/2 citrobacter amalonticu complex bacteria.   CT head negative.   CT abd/pelvis: negative for any abscess but possible pneumonia.   No clear source of infection.   s/p cefepime, switched based on sensitivity to Ceftriaxone 2g IV daily 5/19-   follow Echo to rule out vegetation.     # Severe malnutrition.   Son who is the HCP is controlling his mother diet. Stated that she can not eat most of the food as that causes her a lot of pain and discomfort.   Nutrition consulted and recommended Tink.   Fiber/Residue Restricted (LOWFIBER)  DASH/TLC {Sodium & Cholesterol Restricted} (DASH)      # COPD on home o2  - pulm consult; change in mental status unlikely due to COPD.   - Douneb q4h.   - Spiriva   - Budesonide   - prednisone 20 mg daily for 5 days.   - o2   2L- 3L liters - home dose   - c/w Spiriva / singular / Advair ( symbicort )   - c/w home pred dose     # Anemia with macrocytosis   hgb stable   - folate and b12 levels are normal.      # IBS- constipation:   - Miralax / senna/ Dulcolax   - tap water enema prn   - c/w bentyl     # anxiety / MDD  - c/w home Remeron / Buspar     # HTn   - c/w acei  - low salt diet     DNR/ DNI   molst in chart

## 2024-05-20 ENCOUNTER — RESULT REVIEW (OUTPATIENT)
Age: 79
End: 2024-05-20

## 2024-05-20 LAB
ALBUMIN SERPL ELPH-MCNC: 2.1 G/DL — LOW (ref 3.5–5.2)
ALP SERPL-CCNC: 113 U/L — SIGNIFICANT CHANGE UP (ref 30–115)
ALT FLD-CCNC: 174 U/L — HIGH (ref 0–41)
ANION GAP SERPL CALC-SCNC: 5 MMOL/L — LOW (ref 7–14)
AST SERPL-CCNC: 55 U/L — HIGH (ref 0–41)
BILIRUB SERPL-MCNC: 0.2 MG/DL — SIGNIFICANT CHANGE UP (ref 0.2–1.2)
BUN SERPL-MCNC: 39 MG/DL — HIGH (ref 10–20)
CALCIUM SERPL-MCNC: 7.7 MG/DL — LOW (ref 8.4–10.5)
CHLORIDE SERPL-SCNC: 111 MMOL/L — HIGH (ref 98–110)
CO2 SERPL-SCNC: 27 MMOL/L — SIGNIFICANT CHANGE UP (ref 17–32)
CREAT SERPL-MCNC: 0.7 MG/DL — SIGNIFICANT CHANGE UP (ref 0.7–1.5)
CULTURE RESULTS: ABNORMAL
EGFR: 88 ML/MIN/1.73M2 — SIGNIFICANT CHANGE UP
GLUCOSE SERPL-MCNC: 81 MG/DL — SIGNIFICANT CHANGE UP (ref 70–99)
HCT VFR BLD CALC: 27.9 % — LOW (ref 37–47)
HGB BLD-MCNC: 8.5 G/DL — LOW (ref 12–16)
MAGNESIUM SERPL-MCNC: 2 MG/DL — SIGNIFICANT CHANGE UP (ref 1.8–2.4)
MCHC RBC-ENTMCNC: 30.5 G/DL — LOW (ref 32–37)
MCHC RBC-ENTMCNC: 31.1 PG — HIGH (ref 27–31)
MCV RBC AUTO: 102.2 FL — HIGH (ref 81–99)
NRBC # BLD: 0 /100 WBCS — SIGNIFICANT CHANGE UP (ref 0–0)
PLATELET # BLD AUTO: 158 K/UL — SIGNIFICANT CHANGE UP (ref 130–400)
PMV BLD: 10.2 FL — SIGNIFICANT CHANGE UP (ref 7.4–10.4)
POTASSIUM SERPL-MCNC: 5.6 MMOL/L — HIGH (ref 3.5–5)
POTASSIUM SERPL-SCNC: 5.6 MMOL/L — HIGH (ref 3.5–5)
PROT SERPL-MCNC: 4.8 G/DL — LOW (ref 6–8)
RBC # BLD: 2.73 M/UL — LOW (ref 4.2–5.4)
RBC # FLD: 15.2 % — HIGH (ref 11.5–14.5)
SODIUM SERPL-SCNC: 143 MMOL/L — SIGNIFICANT CHANGE UP (ref 135–146)
SPECIMEN SOURCE: SIGNIFICANT CHANGE UP
WBC # BLD: 9.76 K/UL — SIGNIFICANT CHANGE UP (ref 4.8–10.8)
WBC # FLD AUTO: 9.76 K/UL — SIGNIFICANT CHANGE UP (ref 4.8–10.8)

## 2024-05-20 PROCEDURE — 99232 SBSQ HOSP IP/OBS MODERATE 35: CPT

## 2024-05-20 PROCEDURE — 74018 RADEX ABDOMEN 1 VIEW: CPT | Mod: 26

## 2024-05-20 PROCEDURE — 93306 TTE W/DOPPLER COMPLETE: CPT | Mod: 26

## 2024-05-20 RX ORDER — CHLORHEXIDINE GLUCONATE 213 G/1000ML
1 SOLUTION TOPICAL
Refills: 0 | Status: DISCONTINUED | OUTPATIENT
Start: 2024-05-20 | End: 2024-05-23

## 2024-05-20 RX ORDER — ATOVAQUONE 750 MG/5ML
1500 SUSPENSION ORAL DAILY
Refills: 0 | Status: DISCONTINUED | OUTPATIENT
Start: 2024-05-20 | End: 2024-05-23

## 2024-05-20 RX ADMIN — SODIUM CHLORIDE 75 MILLILITER(S): 9 INJECTION INTRAMUSCULAR; INTRAVENOUS; SUBCUTANEOUS at 05:23

## 2024-05-20 RX ADMIN — ENOXAPARIN SODIUM 40 MILLIGRAM(S): 100 INJECTION SUBCUTANEOUS at 18:22

## 2024-05-20 RX ADMIN — ATOVAQUONE 1500 MILLIGRAM(S): 750 SUSPENSION ORAL at 18:23

## 2024-05-20 RX ADMIN — Medication 650 MILLIGRAM(S): at 11:02

## 2024-05-20 RX ADMIN — MIRTAZAPINE 7.5 MILLIGRAM(S): 45 TABLET, ORALLY DISINTEGRATING ORAL at 21:46

## 2024-05-20 RX ADMIN — Medication 10 MILLIGRAM(S): at 18:22

## 2024-05-20 RX ADMIN — SODIUM CHLORIDE 75 MILLILITER(S): 9 INJECTION INTRAMUSCULAR; INTRAVENOUS; SUBCUTANEOUS at 21:45

## 2024-05-20 RX ADMIN — Medication 20 MILLIGRAM(S): at 06:20

## 2024-05-20 RX ADMIN — TIOTROPIUM BROMIDE 2 PUFF(S): 18 CAPSULE ORAL; RESPIRATORY (INHALATION) at 09:41

## 2024-05-20 RX ADMIN — MONTELUKAST 10 MILLIGRAM(S): 4 TABLET, CHEWABLE ORAL at 12:13

## 2024-05-20 RX ADMIN — Medication 500 MILLIGRAM(S): at 12:15

## 2024-05-20 RX ADMIN — Medication 81 MILLIGRAM(S): at 12:13

## 2024-05-20 RX ADMIN — Medication 1 DROP(S): at 18:21

## 2024-05-20 RX ADMIN — LISINOPRIL 10 MILLIGRAM(S): 2.5 TABLET ORAL at 05:22

## 2024-05-20 RX ADMIN — SODIUM CHLORIDE 75 MILLILITER(S): 9 INJECTION INTRAMUSCULAR; INTRAVENOUS; SUBCUTANEOUS at 18:24

## 2024-05-20 RX ADMIN — Medication 20 MILLIGRAM(S): at 05:22

## 2024-05-20 RX ADMIN — Medication 650 MILLIGRAM(S): at 11:32

## 2024-05-20 RX ADMIN — SENNA PLUS 2 TABLET(S): 8.6 TABLET ORAL at 21:46

## 2024-05-20 RX ADMIN — BUDESONIDE AND FORMOTEROL FUMARATE DIHYDRATE 2 PUFF(S): 160; 4.5 AEROSOL RESPIRATORY (INHALATION) at 09:42

## 2024-05-20 RX ADMIN — DORZOLAMIDE HYDROCHLORIDE 1 DROP(S): 20 SOLUTION/ DROPS OPHTHALMIC at 21:46

## 2024-05-20 RX ADMIN — DORZOLAMIDE HYDROCHLORIDE 1 DROP(S): 20 SOLUTION/ DROPS OPHTHALMIC at 05:22

## 2024-05-20 RX ADMIN — CEFTRIAXONE 100 MILLIGRAM(S): 500 INJECTION, POWDER, FOR SOLUTION INTRAMUSCULAR; INTRAVENOUS at 12:18

## 2024-05-20 RX ADMIN — Medication 10 MILLIGRAM(S): at 12:13

## 2024-05-20 RX ADMIN — Medication 3 MILLIGRAM(S): at 21:47

## 2024-05-20 RX ADMIN — Medication 10 MILLIGRAM(S): at 12:07

## 2024-05-20 RX ADMIN — Medication 1 DROP(S): at 05:23

## 2024-05-20 RX ADMIN — DORZOLAMIDE HYDROCHLORIDE 1 DROP(S): 20 SOLUTION/ DROPS OPHTHALMIC at 13:58

## 2024-05-20 NOTE — PROGRESS NOTE ADULT - ASSESSMENT
This is a 78 year old  female with PMH of COPD on home O2 and IBS is brought in due to confusion.     IMPRESSION  #Citrobacter bacteremia- Life threatening    #Metabolic encephalopathy   #chronic hypoxic resp failure, COPD on home O2.   #Lower chest with debris/secretions in bibasilar distal airways. Complete collapse of the left lower lobe and small left pleural effusion noted on CT  #Chronically on steroids. History of bronchiectasis.   #History of TIA s/p TPA 2022 (MRI was negative)  #History of IBS, Constipation  #Functionally not very active.   #CKD 3  #Sacral ulcer Stage 2 present on admission  #Obesity BMI (kg/m2): 18.3    RECOMMENDATIONS  -Collect sputum cultures if possible. Collect AFB cultures (for NTM) No need for isolation precautions.  -UA not impressive but was treated with oral antibiotics recently and Urine culture was negative (4/2024) Perhaps Macrobid or Bactrim  -Obtain wound care evaluation for the sacral decubitus.  -Continue with IV ceftriaxone 2 gram q 24 hours for now.   -May need to consider PJP ppx if planned for prolonged high dose steroids.   -Off loading to prevent pressure sores and preventive measures to avoid aspiration    If any questions, please send a message or call on [employer] Teams  Please continue to update ID with any pertinent new laboratory or radiographic findings.    Kumar Ng M.D  Infectious Diseases Attending/   [employer] at [facility]   This is a 78 year old  female with PMH of COPD on home O2 and IBS is brought in due to confusion.     IMPRESSION  #Citrobacter bacteremia- Life threatening    #Metabolic encephalopathy   #chronic hypoxic resp failure, COPD on home O2.   #Lower chest with debris/secretions in bibasilar distal airways. Complete collapse of the left lower lobe and small left pleural effusion noted on CT  #Chronically on steroids. History of bronchiectasis.   #History of TIA s/p TPA 2022 (MRI was negative)  #History of IBS, Constipation  #Functionally not very active.   #CKD 3  #Sacral ulcer Stage 2 present on admission  #Obesity BMI (kg/m2): 18.3    RECOMMENDATIONS  -Collect sputum cultures if possible. Collect AFB cultures (for NTM) No need for isolation precautions.  -UA not impressive but was treated with oral antibiotics recently and Urine culture was negative (4/2024) Perhaps Macrobid or Bactrim  -Monitor LFTs. Check KUB or bladder scan for abdominal pain.   -Obtain wound care evaluation for the sacral decubitus.  -Continue with IV ceftriaxone 2 gram q 24 hours for now.   -May need to consider PJP ppx if planned for prolonged high dose steroids. Can be started on atovaquone 1500 mg daily.   -Off loading to prevent pressure sores and preventive measures to avoid aspiration.   -Goals of care.     If any questions, please send a message or call on [employer] Teams  Please continue to update ID with any pertinent new laboratory or radiographic findings.    Kumar Ng M.D  Infectious Diseases Attending/   [employer] at [facility]

## 2024-05-20 NOTE — PHARMACOTHERAPY INTERVENTION NOTE - COMMENTS
To assist with antimicrobial dosing and estimation of renal function, updated patient's height for this admission to 157.5 cm, as this was the height previously documented in [facility], and there is no height documented for this admission. 
Recommended changing cefepime dose from 1g IV q8h to 2g IV q12h STAT for treatment of Enterobacterales bacteremia given CrCl of ~47 mL/min. 
Modified penicillin allergy history to state that patient tolerated cefepime and ceftriaxone during this admission.

## 2024-05-20 NOTE — PROGRESS NOTE ADULT - ASSESSMENT
78 year old  female brought in by ambulance today for an episode of unresponsiveness and increased confusion this morning.  Family states that the patient has been taking Prednisone since January for COPD and IBS and attributes the change in mental status to a side effect of the steroid.  Patient was disoriented and confused this morning and could not respond to her son.  Patient also has been eating less and has needed to be carried upstairs and on/off the toilet an increasing amount of times since the winter.  Patient's confusion was in relation to daily routines rather than forgetting names and places.  Patient has increased tiredness and weakness/lack of energy - not being able to keep her head up, per son.  The symptoms are worse when the patient is tired, in the morning and at night.      # metabolic encephalopathy most likely 2/2 citrobacter amalonticu complex bacteria.   CT head negative.   CT abd/pelvis: negative for any abscess but possible pneumonia.   ID recommended sacral ulcer wound eval.   ID recommended to obtain AFB stain to rule out NTM infection ( no isolation needed).   s/p cefepime, switched based on sensitivity to Ceftriaxone 2g IV daily 5/19-   follow Echo to rule out vegetation.     # Severe malnutrition.   Son who is the HCP is controlling his mother diet. Stated that she can not eat most of the food as that causes her a lot of pain and discomfort.   Nutrition consulted and recommended Saranya Valladares.   Fiber/Residue Restricted (LOWFIBER)  DASH/TLC {Sodium & Cholesterol Restricted} (DASH)      # COPD on home o2  - pulm consult; change in mental status unlikely due to COPD.   - Douneb q4h.   - Spiriva   - Budesonide   - prednisone 20 mg daily for 5 days.   - o2   2L- 3L liters - home dose   - c/w Spiriva / singular / Advair ( symbicort )   - c/w home pred dose     # Anemia with macrocytosis   hgb stable   - folate and b12 levels are normal.      # IBS- constipation:   - Miralax / senna/ Dulcolax   - tap water enema prn   - c/w bentyl     # anxiety / MDD  - c/w home Remeron / Buspar     # HTn   - c/w acei  - low salt diet     DNR/ DNI   molst in chart

## 2024-05-21 LAB
ALBUMIN SERPL ELPH-MCNC: 2.1 G/DL — LOW (ref 3.5–5.2)
ALP SERPL-CCNC: 126 U/L — HIGH (ref 30–115)
ALT FLD-CCNC: 155 U/L — HIGH (ref 0–41)
ANION GAP SERPL CALC-SCNC: 9 MMOL/L — SIGNIFICANT CHANGE UP (ref 7–14)
AST SERPL-CCNC: 49 U/L — HIGH (ref 0–41)
BILIRUB SERPL-MCNC: 0.2 MG/DL — SIGNIFICANT CHANGE UP (ref 0.2–1.2)
BUN SERPL-MCNC: 29 MG/DL — HIGH (ref 10–20)
CALCIUM SERPL-MCNC: 7.3 MG/DL — LOW (ref 8.4–10.5)
CHLORIDE SERPL-SCNC: 110 MMOL/L — SIGNIFICANT CHANGE UP (ref 98–110)
CO2 SERPL-SCNC: 24 MMOL/L — SIGNIFICANT CHANGE UP (ref 17–32)
CREAT SERPL-MCNC: 0.6 MG/DL — LOW (ref 0.7–1.5)
EGFR: 91 ML/MIN/1.73M2 — SIGNIFICANT CHANGE UP
GLUCOSE SERPL-MCNC: 74 MG/DL — SIGNIFICANT CHANGE UP (ref 70–99)
HCT VFR BLD CALC: 28.4 % — LOW (ref 37–47)
HGB BLD-MCNC: 8.8 G/DL — LOW (ref 12–16)
MCHC RBC-ENTMCNC: 31 G/DL — LOW (ref 32–37)
MCHC RBC-ENTMCNC: 31.3 PG — HIGH (ref 27–31)
MCV RBC AUTO: 101.1 FL — HIGH (ref 81–99)
NRBC # BLD: 0 /100 WBCS — SIGNIFICANT CHANGE UP (ref 0–0)
PLATELET # BLD AUTO: 177 K/UL — SIGNIFICANT CHANGE UP (ref 130–400)
PMV BLD: 9.8 FL — SIGNIFICANT CHANGE UP (ref 7.4–10.4)
POTASSIUM SERPL-MCNC: 4.4 MMOL/L — SIGNIFICANT CHANGE UP (ref 3.5–5)
POTASSIUM SERPL-SCNC: 4.4 MMOL/L — SIGNIFICANT CHANGE UP (ref 3.5–5)
PROT SERPL-MCNC: 4.9 G/DL — LOW (ref 6–8)
RBC # BLD: 2.81 M/UL — LOW (ref 4.2–5.4)
RBC # FLD: 15.2 % — HIGH (ref 11.5–14.5)
SODIUM SERPL-SCNC: 143 MMOL/L — SIGNIFICANT CHANGE UP (ref 135–146)
WBC # BLD: 11.69 K/UL — HIGH (ref 4.8–10.8)
WBC # FLD AUTO: 11.69 K/UL — HIGH (ref 4.8–10.8)

## 2024-05-21 PROCEDURE — 99232 SBSQ HOSP IP/OBS MODERATE 35: CPT

## 2024-05-21 PROCEDURE — 99221 1ST HOSP IP/OBS SF/LOW 40: CPT

## 2024-05-21 RX ORDER — SODIUM CHLORIDE 9 MG/ML
1000 INJECTION, SOLUTION INTRAVENOUS
Refills: 0 | Status: DISCONTINUED | OUTPATIENT
Start: 2024-05-21 | End: 2024-05-23

## 2024-05-21 RX ADMIN — CHLORHEXIDINE GLUCONATE 1 APPLICATION(S): 213 SOLUTION TOPICAL at 05:22

## 2024-05-21 RX ADMIN — DORZOLAMIDE HYDROCHLORIDE 1 DROP(S): 20 SOLUTION/ DROPS OPHTHALMIC at 13:32

## 2024-05-21 RX ADMIN — Medication 3 MILLIGRAM(S): at 21:22

## 2024-05-21 RX ADMIN — SENNA PLUS 2 TABLET(S): 8.6 TABLET ORAL at 21:22

## 2024-05-21 RX ADMIN — Medication 10 MILLIGRAM(S): at 20:27

## 2024-05-21 RX ADMIN — BUDESONIDE AND FORMOTEROL FUMARATE DIHYDRATE 2 PUFF(S): 160; 4.5 AEROSOL RESPIRATORY (INHALATION) at 07:29

## 2024-05-21 RX ADMIN — Medication 20 MILLIGRAM(S): at 07:28

## 2024-05-21 RX ADMIN — Medication 650 MILLIGRAM(S): at 19:33

## 2024-05-21 RX ADMIN — Medication 650 MILLIGRAM(S): at 12:57

## 2024-05-21 RX ADMIN — SODIUM CHLORIDE 50 MILLILITER(S): 9 INJECTION, SOLUTION INTRAVENOUS at 17:50

## 2024-05-21 RX ADMIN — TIOTROPIUM BROMIDE 2 PUFF(S): 18 CAPSULE ORAL; RESPIRATORY (INHALATION) at 07:29

## 2024-05-21 RX ADMIN — Medication 650 MILLIGRAM(S): at 13:32

## 2024-05-21 RX ADMIN — MONTELUKAST 10 MILLIGRAM(S): 4 TABLET, CHEWABLE ORAL at 11:45

## 2024-05-21 RX ADMIN — ATOVAQUONE 1500 MILLIGRAM(S): 750 SUSPENSION ORAL at 11:47

## 2024-05-21 RX ADMIN — Medication 1 DROP(S): at 17:52

## 2024-05-21 RX ADMIN — LISINOPRIL 10 MILLIGRAM(S): 2.5 TABLET ORAL at 05:23

## 2024-05-21 RX ADMIN — Medication 500 MILLIGRAM(S): at 11:46

## 2024-05-21 RX ADMIN — DORZOLAMIDE HYDROCHLORIDE 1 DROP(S): 20 SOLUTION/ DROPS OPHTHALMIC at 05:23

## 2024-05-21 RX ADMIN — DORZOLAMIDE HYDROCHLORIDE 1 DROP(S): 20 SOLUTION/ DROPS OPHTHALMIC at 21:21

## 2024-05-21 RX ADMIN — Medication 81 MILLIGRAM(S): at 11:45

## 2024-05-21 RX ADMIN — ENOXAPARIN SODIUM 40 MILLIGRAM(S): 100 INJECTION SUBCUTANEOUS at 17:53

## 2024-05-21 RX ADMIN — CEFTRIAXONE 100 MILLIGRAM(S): 500 INJECTION, POWDER, FOR SOLUTION INTRAMUSCULAR; INTRAVENOUS at 12:57

## 2024-05-21 RX ADMIN — Medication 10 MILLIGRAM(S): at 11:45

## 2024-05-21 RX ADMIN — Medication 1 DROP(S): at 05:23

## 2024-05-21 NOTE — CONSULT NOTE ADULT - SUBJECTIVE AND OBJECTIVE BOX
HPI:  Patient is a poor historian. HPI obtained from  and two sons.      Patient is a 78 year old  female brought in by ambulance today for an episode of unresponsiveness and increased confusion this morning.  Family states that the patient has been taking Prednisone since January for COPD and IBS and attributes the change in mental status to a side effect of the steroid.  Patient was disoriented and confused this morning and could not respond to her son.  Patient also has been eating less and has needed to be carried upstairs and on/off the toilet an increasing amount of times since the winter.  Patient's confusion was in relation to daily routines rather than forgetting names and places.  Patient has increased tiredness and weakness/lack of energy - not being able to keep her head up, per son.  The symptoms are worse when the patient is tired, in the morning and at night.  Patient needs to be handled by son.  Nothing makes the symptoms better or worse.  Son attributes the symptoms to a lack of nutrition due to patients fear of eating pains and bathroom pains due to Colitis and IBS.      Patient and family deny any other symptoms such as urinary incontinence, stool incontinence, agitation, dementia.    denies cp /fever / chills     pcp : home care program with dr jesus / chelesy madison   last colonoscopy 10 years ago , unknown gi     ED : given solumedrol IVP    son GEORGETTE : [phone]                      [phone]  son CORBIN : [phone]    GEORGETTE SR.  [phone] (16 May 2024 15:15)            PAST MEDICAL & SURGICAL HISTORY:  HTN (hypertension)      High cholesterol      CVA (cerebrovascular accident)      Advanced COPD      Colitis      Glaucoma      Anxiety      Implantable loop recorder present          REVIEW OF SYSTEMS: Pt unable to offer  General/ Breast/ Skin/ Neuro/ MSK: see HPI  All other systems negative    MEDICATIONS  (STANDING):  ascorbic acid 500 milliGRAM(s) Oral daily  aspirin  chewable 81 milliGRAM(s) Oral daily  atovaquone  Suspension 1500 milliGRAM(s) Oral daily  budesonide 160 MICROgram(s)/formoterol 4.5 MICROgram(s) Inhaler 2 Puff(s) Inhalation two times a day  busPIRone 10 milliGRAM(s) Oral <User Schedule>  busPIRone 20 milliGRAM(s) Oral <User Schedule>  cefTRIAXone   IVPB 2000 milliGRAM(s) IV Intermittent every 24 hours  chlorhexidine 2% Cloths 1 Application(s) Topical <User Schedule>  dorzolamide 2% Ophthalmic Solution 1 Drop(s) Both EYES three times a day  enoxaparin Injectable 40 milliGRAM(s) SubCutaneous every 24 hours  lisinopril 10 milliGRAM(s) Oral daily  melatonin 3 milliGRAM(s) Oral at bedtime  mirtazapine 7.5 milliGRAM(s) Oral <User Schedule>  montelukast 10 milliGRAM(s) Oral daily  senna 2 Tablet(s) Oral at bedtime  sodium chloride 0.9%. 1000 milliLiter(s) (75 mL/Hr) IV Continuous <Continuous>  timolol 0.5% Solution 1 Drop(s) Both EYES two times a day  tiotropium 2.5 MICROgram(s) Inhaler 2 Puff(s) Inhalation daily    MEDICATIONS  (PRN):  acetaminophen     Tablet .. 650 milliGRAM(s) Oral every 6 hours PRN Temp greater or equal to 38C (100.4F), Mild Pain (1 - 3)  benzonatate 100 milliGRAM(s) Oral three times a day PRN Cough  bisacodyl 5 milliGRAM(s) Oral every 12 hours PRN Constipation  dicyclomine 10 milliGRAM(s) Oral three times a day before meals PRN abd cramps  guaifenesin/dextromethorphan Oral Liquid 5 milliLiter(s) Oral every 8 hours PRN Cough  ondansetron Injectable 4 milliGRAM(s) IV Push every 8 hours PRN Nausea and/or Vomiting  polyethylene glycol 3350 17 Gram(s) Oral daily PRN Constipation      Allergies    penicillins (Unknown)    Intolerances        SOCIAL HISTORY:  / /single/ ; (+)HHA/ lives in [facility]; Former smoker, No/ Denies smoking, ETOH, drugs    FAMILY HISTORY:  Family history of anxiety disorder (Child)    FHx: breast cancer (Mother)    FHx: kidney cancer (Father)       >>> <<<    PHYSICAL EXAM:  Vital Signs Last 24 Hrs  T(C): 37.1 (21 May 2024 05:09), Max: 37.1 (21 May 2024 05:09)  T(F): 98.8 (21 May 2024 05:09), Max: 98.8 (21 May 2024 05:09)  HR: 82 (21 May 2024 05:09) (60 - 82)  BP: 154/78 (21 May 2024 05:09) (137/66 - 154/78)  BP(mean): --  RR: 16 (21 May 2024 05:09) (16 - 18)  SpO2: 95% (21 May 2024 08:20) (95% - 97%)    Parameters below as of 21 May 2024 08:20  Patient On (Oxygen Delivery Method): nasal cannula  O2 Flow (L/min): 3       General : NAD / Guarded but stable,  A&Ox3/ Alert/ Confused  cachectic/ thin/  MO/ Obese/ frail  WD/ WN/ WG/ Disheveled   HEENT:  NC/AT, PERRL, EOMI, sclera clear, mucosa moist, throat clear, trachea midline, neck supple  Cardiovascular: RRR (+)m  Respiratory: CTA/ equal chest rise  Gastrointestinal soft NT/ND (+)BS  (+)PEG (+)ostomy (+)NGT  Neurology:  weakened strength & sensation grossly intact/ paraesthesia  nonverbal, no follow commands/ paraplegic  Psych: calm/ appropriate/ flat affect/ easily aggitated/ restless  Musculoskeletal:  limited stiff / FROM, no deformities/ contractures  Vascular: BLE equally warm/ cool,  no cyanosis, clubbing, edema           >LE //BLE edema equal           BLE DP/PT pulses palpable           no acute ischemia noted          BLE hemosiderin staining  Skin:  moist w/ good turgor  pale, frail,  ecchymosis w/o hematoma  serosanguinous drainage  No odor, erythema, increased warmth, tenderness, induration, fluctuance, nor crepitus    LABS/ CULTURES/ RADIOLOGY:                        8.8    11.69 )-----------( 177      ( 21 May 2024 08:30 )             28.4       143  |  110  |  29  ----------------------------<  74      [05-21-24 @ 08:30]  4.4   |  24  |  0.6        Ca     7.3     [05-21-24 @ 08:30]      Mg     2.0     [05-20-24 @ 05:45]    TPro  4.9  /  Alb  2.1  /  TBili  0.2  /  DBili  x   /  AST  49  /  ALT  155  /  AlkPhos  126  [05-21-24 @ 08:30]              Culture - Blood (collected 05-19-24 @ 15:39)  Source: .Blood None  Preliminary Report (05-21-24 @ 04:01):    No growth at 24 hours    Culture - Blood (collected 05-19-24 @ 15:39)  Source: .Blood None  Preliminary Report (05-21-24 @ 04:01):    No growth at 24 hours    Culture - Blood (collected 05-16-24 @ 09:40)  Source: .Blood Blood-Peripheral  Gram Stain (05-17-24 @ 08:09):    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 bottle: Gram Negative Rods    Growth in anaerobic bottle: Gram Negative Rods  Final Report (05-18-24 @ 14:23):    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 and anaerobic bottles: Citrobacter amalonaticus complex    Direct identification is available within approximately 3-5    hours either by Blood Panel Multiplexed PCR or Direct    MALDI-TOF. Details: [email or web address]  Organism: Blood Culture PCR  Citrobacter amalonaticus complex (05-18-24 @ 14:23)  Organism: Citrobacter amalonaticus complex (05-18-24 @ 14:23)      Method Type: CHAPARRO      -  Ampicillin: R 16 These ampicillin results predict results for amoxicillin      -  Ampicillin/Sulbactam: S <=4/2      -  Aztreonam: S <=4      -  Cefazolin: R >16      -  Cefepime: S <=2      -  Cefoxitin: S <=8      -  Ceftriaxone: S <=1      -  Ciprofloxacin: S <=0.25      -  Ertapenem: S <=0.5      -  Gentamicin: S <=2      -  Imipenem: S <=1      -  Levofloxacin: S <=0.5      -  Meropenem: S <=1      -  Piperacillin/Tazobactam: S <=8      -  Tobramycin: S <=2      -  Trimethoprim/Sulfamethoxazole: S <=0.5/9.5  Organism: Blood Culture PCR (05-18-24 @ 14:23)      Method Type: PCR      -  Enterobacterales: Detec    Culture - Blood (collected 05-16-24 @ 09:40)  Source: .Blood Blood-Peripheral  Gram Stain (05-17-24 @ 18:41):    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 bottle: Gram Negative Rods    Growth in anaerobic bottle: Gram Negative Rods  Final Report (05-20-24 @ 15:31):    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 bottle: Citrobacter amalonaticus complex    See previous culture [id number]    Growth in anaerobic bottle: Gram Negative Rods Most closely resembling    Hungatella species "Susceptibilities not performed"    Please Note:************************************************    Hungatella species    may appear as gram negative rods in direct smears of clinical specimens.                       HPI:  Patient is a poor historian. HPI obtained from  and two sons.    Patient is a 78 year old  female brought in by ambulance today for an episode of unresponsiveness and increased confusion this morning.  Family states that the patient has been taking Prednisone since January for COPD and IBS and attributes the change in mental status to a side effect of the steroid.  Patient was disoriented and confused this morning and could not respond to her son.  Patient also has been eating less and has needed to be carried upstairs and on/off the toilet an increasing amount of times since the winter.  Patient's confusion was in relation to daily routines rather than forgetting names and places.  Patient has increased tiredness and weakness/lack of energy - not being able to keep her head up, per son.  The symptoms are worse when the patient is tired, in the morning and at night.  Patient needs to be handled by son.  Nothing makes the symptoms better or worse.  Son attributes the symptoms to a lack of nutrition due to patients fear of eating pains and bathroom pains due to Colitis and IBS.    Patient and family deny any other symptoms such as urinary incontinence, stool incontinence, agitation, dementia.    denies cp /fever / chills   pcp : home care program with dr jesus / chelsey madison   last colonoscopy 10 years ago , unknown gi     ED : given solumedrol IVP    son GEORGETTE : [phone]                      [phone]  son CORBIN : [phone]    GEORGETTE SR.  [phone] (16 May 2024 15:15)    PAST MEDICAL & SURGICAL HISTORY:  HTN (hypertension)  High cholesterol  CVA (cerebrovascular accident)  Advanced COPD  Colitis  Glaucoma  Anxiety  Implantable loop recorder present    REVIEW OF SYSTEMS:  All other systems negative, unless indicated in HPI    MEDICATIONS  (STANDING):  ascorbic acid 500 milliGRAM(s) Oral daily  aspirin  chewable 81 milliGRAM(s) Oral daily  atovaquone  Suspension 1500 milliGRAM(s) Oral daily  budesonide 160 MICROgram(s)/formoterol 4.5 MICROgram(s) Inhaler 2 Puff(s) Inhalation two times a day  busPIRone 10 milliGRAM(s) Oral <User Schedule>  busPIRone 20 milliGRAM(s) Oral <User Schedule>  cefTRIAXone   IVPB 2000 milliGRAM(s) IV Intermittent every 24 hours  chlorhexidine 2% Cloths 1 Application(s) Topical <User Schedule>  dorzolamide 2% Ophthalmic Solution 1 Drop(s) Both EYES three times a day  enoxaparin Injectable 40 milliGRAM(s) SubCutaneous every 24 hours  lisinopril 10 milliGRAM(s) Oral daily  melatonin 3 milliGRAM(s) Oral at bedtime  mirtazapine 7.5 milliGRAM(s) Oral <User Schedule>  montelukast 10 milliGRAM(s) Oral daily  senna 2 Tablet(s) Oral at bedtime  sodium chloride 0.9%. 1000 milliLiter(s) (75 mL/Hr) IV Continuous <Continuous>  timolol 0.5% Solution 1 Drop(s) Both EYES two times a day  tiotropium 2.5 MICROgram(s) Inhaler 2 Puff(s) Inhalation daily    MEDICATIONS  (PRN):  acetaminophen     Tablet .. 650 milliGRAM(s) Oral every 6 hours PRN Temp greater or equal to 38C (100.4F), Mild Pain (1 - 3)  benzonatate 100 milliGRAM(s) Oral three times a day PRN Cough  bisacodyl 5 milliGRAM(s) Oral every 12 hours PRN Constipation  dicyclomine 10 milliGRAM(s) Oral three times a day before meals PRN abd cramps  guaifenesin/dextromethorphan Oral Liquid 5 milliLiter(s) Oral every 8 hours PRN Cough  ondansetron Injectable 4 milliGRAM(s) IV Push every 8 hours PRN Nausea and/or Vomiting  polyethylene glycol 3350 17 Gram(s) Oral daily PRN Constipation      Allergies  penicillins (Unknown)  Intolerances    SOCIAL HISTORY:  , home     FAMILY HISTORY:  Family history of anxiety disorder (Child)  FHx: breast cancer (Mother)    FHx: kidney cancer (Father)        PHYSICAL EXAM:  Vital Signs Last 24 Hrs  T(C): 37.1 (21 May 2024 05:09), Max: 37.1 (21 May 2024 05:09)  T(F): 98.8 (21 May 2024 05:09), Max: 98.8 (21 May 2024 05:09)  HR: 82 (21 May 2024 05:09) (60 - 82)  BP: 154/78 (21 May 2024 05:09) (137/66 - 154/78)  BP(mean): --  RR: 16 (21 May 2024 05:09) (16 - 18)  SpO2: 95% (21 May 2024 08:20) (95% - 97%)    Parameters below as of 21 May 2024 08:20  Patient On (Oxygen Delivery Method): nasal cannula  O2 Flow (L/min): 3  GENERAL: catechetic   HEENT - NC/AT, pupils equal and reactive to light,  ; Moist mucous membranes, Good dentition, No lesions  NECK: Supple, No JVD  CHEST/LUNG: Clear to auscultation bilaterally; No rales, rhonchi, wheezing  HEART: Regular rate and rhythm; No murmurs, rubs, or gallops  ABDOMEN: Soft, Nontender, Nondistended; Bowel sounds present  EXTREMITIES:  2+ Peripheral Pulses, No clubbing, cyanosis, or edema  NEURO:  No Focal deficits, sensory and motor intact  Skin : Pressure Injury       LABS/ CULTURES/ RADIOLOGY:                        8.8    11.69 )-----------( 177      ( 21 May 2024 08:30 )             28.4       143  |  110  |  29  ----------------------------<  74      [05-21-24 @ 08:30]  4.4   |  24  |  0.6        Ca     7.3     [05-21-24 @ 08:30]      Mg     2.0     [05-20-24 @ 05:45]    TPro  4.9  /  Alb  2.1  /  TBili  0.2  /  DBili  x   /  AST  49  /  ALT  155  /  AlkPhos  126  [05-21-24 @ 08:30]              Culture - Blood (collected 05-19-24 @ 15:39)  Source: .Blood None  Preliminary Report (05-21-24 @ 04:01):    No growth at 24 hours    Culture - Blood (collected 05-19-24 @ 15:39)  Source: .Blood None  Preliminary Report (05-21-24 @ 04:01):    No growth at 24 hours    Culture - Blood (collected 05-16-24 @ 09:40)  Source: .Blood Blood-Peripheral  Gram Stain (05-17-24 @ 08:09):    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 bottle: Gram Negative Rods    Growth in anaerobic bottle: Gram Negative Rods  Final Report (05-18-24 @ 14:23):    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 and anaerobic bottles: Citrobacter amalonaticus complex    Direct identification is available within approximately 3-5    hours either by Blood Panel Multiplexed PCR or Direct    MALDI-TOF. Details: [email or web address]  Organism: Blood Culture PCR  Citrobacter amalonaticus complex (05-18-24 @ 14:23)  Organism: Citrobacter amalonaticus complex (05-18-24 @ 14:23)      Method Type: CHAPARRO      -  Ampicillin: R 16 These ampicillin results predict results for amoxicillin      -  Ampicillin/Sulbactam: S <=4/2      -  Aztreonam: S <=4      -  Cefazolin: R >16      -  Cefepime: S <=2      -  Cefoxitin: S <=8      -  Ceftriaxone: S <=1      -  Ciprofloxacin: S <=0.25      -  Ertapenem: S <=0.5      -  Gentamicin: S <=2      -  Imipenem: S <=1      -  Levofloxacin: S <=0.5      -  Meropenem: S <=1      -  Piperacillin/Tazobactam: S <=8      -  Tobramycin: S <=2      -  Trimethoprim/Sulfamethoxazole: S <=0.5/9.5  Organism: Blood Culture PCR (05-18-24 @ 14:23)      Method Type: PCR      -  Enterobacterales: Detec    Culture - Blood (collected 05-16-24 @ 09:40)  Source: .Blood Blood-Peripheral  Gram Stain (05-17-24 @ 18:41):    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 bottle: Gram Negative Rods    Growth in anaerobic bottle: Gram Negative Rods  Final Report (05-20-24 @ 15:31):    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 bottle: Citrobacter amalonaticus complex    See previous culture [id number]    Growth in anaerobic bottle: Gram Negative Rods Most closely resembling    Hungatella species "Susceptibilities not performed"    Please Note:************************************************    Hungatella species    may appear as gram negative rods in direct smears of clinical specimens.

## 2024-05-21 NOTE — PROGRESS NOTE ADULT - ASSESSMENT
This is a 78 year old  female with PMH of COPD on home O2 and IBS is brought in due to confusion.     IMPRESSION  #Citrobacter bacteremia- Life threatening. Source perhaps GI translocation.    #Metabolic encephalopathy   #chronic hypoxic resp failure, COPD on home O2.   #Lower chest with debris/secretions in bibasilar distal airways. Complete collapse of the left lower lobe and small left pleural effusion noted on CT  #Chronically on steroids. History of bronchiectasis.   #History of TIA s/p TPA 2022 (MRI was negative)  #History of IBS, Constipation  #Functionally not very active.   #CKD 3  #Sacral ulcer Stage 2 present on admission  #Obesity BMI (kg/m2): 18.3    RECOMMENDATIONS  -Collect sputum cultures if possible. Collect AFB cultures (for NTM) No need for isolation precautions.  -UA not impressive but was treated with oral antibiotics recently and Urine culture was negative (4/2024) Perhaps Macrobid or Bactrim  -Monitor LFTs. Wound care as per the team.   -Continue with IV ceftriaxone 2 gram q 24 hours for now. When closer to discharge, can be switched to PO levofloxacin 500mg q 24 from 5/19/24 for 10 days.    -Elevated LDH. Follow up with fungitell. Started on atovaquone 1500 mg daily. Can be continued until prednisone is tapered below 20mg once a day.   -Off loading to prevent pressure sores and preventive measures to avoid aspiration.   -Goals of care.     If any questions, please send a message or call on [employer] Teams  Please continue to update ID with any pertinent new laboratory or radiographic findings.    Kumar Ng M.D  Infectious Diseases Attending/   [employer] at [facility]       This is a 78 year old  female with PMH of COPD on home O2 and IBS is brought in due to confusion.     IMPRESSION  #Citrobacter bacteremia- Life threatening. Source perhaps GI translocation.    #Metabolic encephalopathy   #chronic hypoxic resp failure, COPD on home O2.   #Lower chest with debris/secretions in bibasilar distal airways. Complete collapse of the left lower lobe and small left pleural effusion noted on CT  #Chronically on steroids. History of bronchiectasis.   #History of TIA s/p TPA 2022 (MRI was negative)  #History of IBS, Constipation  #Functionally not very active.   #CKD 3  #Sacral ulcer Stage 2 present on admission  #Obesity BMI (kg/m2): 18.3    RECOMMENDATIONS  -Collect sputum cultures if possible. Collect AFB cultures (for NTM) No need for isolation precautions.  -UA not impressive but was treated with oral antibiotics recently and Urine culture was negative (4/2024) Perhaps Macrobid or Bactrim  -Monitor LFTs. Wound care as per the team.   -Continue with IV ceftriaxone 2 gram q 24 hours for now. When closer to discharge, can be switched to PO levofloxacin 500mg q 24 from 5/19/24 for 10 days.    -Elevated LDH. Follow up with fungitell. Started on atovaquone 1500 mg daily. Can be continued until prednisone is tapered below 20mg once a day.   -Off loading to prevent pressure sores and preventive measures to avoid aspiration.   -Goals of care. She will benefit from PCV 20 and RSV vaccines in 1-2 months.     If any questions, please send a message or call on [employer] Teams  Please continue to update ID with any pertinent new laboratory or radiographic findings.    Kumar Ng M.D  Infectious Diseases Attending/   [employer] at [facility]

## 2024-05-21 NOTE — PROGRESS NOTE ADULT - ASSESSMENT
78 year old  female brought in by ambulance today for an episode of unresponsiveness and increased confusion this morning.  Family states that the patient has been taking Prednisone since January for COPD and IBS and attributes the change in mental status to a side effect of the steroid.  Patient was disoriented and confused this morning and could not respond to her son.  Patient also has been eating less and has needed to be carried upstairs and on/off the toilet an increasing amount of times since the winter.  Patient's confusion was in relation to daily routines rather than forgetting names and places.  Patient has increased tiredness and weakness/lack of energy - not being able to keep her head up, per son.  The symptoms are worse when the patient is tired, in the morning and at night.      # metabolic encephalopathy most likely 2/2 citrobacter amalonticu complex bacteremia.    CT head negative.   CT abd/pelvis: negative for any abscess but possible pneumonia.   Echo negative for vegetation.   Repeat blood culture 5/19 negative.   ID recommended sacral ulcer wound eval. DONE today.   ID recommended to obtain AFB stain to rule out NTM infection ( no isolation needed). pt not able to produce sputum will try with nebulizer to induce sputum.   s/p cefepime, switched based on sensitivity to Ceftriaxone 2g IV daily 5/19- When closer to discharge, can be switched to PO levofloxacin 500mg q 24 from 5/19/24 for 10 days.    -Elevated LDH. Follow up with fungitell. Started on atovaquone 1500 mg daily. Can be continued until prednisone is tapered below 20mg once a day.   -Off loading to prevent pressure sores and preventive measures to avoid aspiration.       # Severe malnutrition.   Son who is the HCP is controlling his mother diet. Stated that she can not eat most of the food as that causes her a lot of pain and discomfort. advised the son on the importance of proper nutrtion to fight the infection, showed understanding and has been more cooperative  Nutrition consulted and recommended [employer].   Fiber/Residue Restricted (LOWFIBER)  DASH/TLC {Sodium & Cholesterol Restricted} (DASH)    # sacrococcygeal pressure wound.   Size: ~3x2  Tissue Description :    Red,   Wound Exudate : None   Wound Edge:  Irregular   Periwound Condition  : Macerated   Clean  wound with soap and water, pat dry then apply triad hydrophilic  dressing   Assess wound daily and inform primary provider of any changes     # COPD on home o2  - pulm consult; change in mental status unlikely due to COPD.   - Douneb q4h.   - Spiriva   - Budesonide   - prednisone 20 mg daily for 5 days.   - o2   2L- 3L liters - home dose   - c/w Spiriva / singular / Advair ( symbicort )   - c/w home pred dose     # Anemia with macrocytosis   hgb stable   - folate and b12 levels are normal.      # IBS- constipation:   - Miralax / senna/ Dulcolax   - tap water enema prn   - c/w bentyl     # anxiety / MDD  - c/w home Remeron / Buspar     # HTn   - c/w acei  - low salt diet     DNR/ DNI   molst in chart      78 year old  female brought in by ambulance today for an episode of unresponsiveness and increased confusion this morning.  Family states that the patient has been taking Prednisone since January for COPD and IBS and attributes the change in mental status to a side effect of the steroid.  Patient was disoriented and confused this morning and could not respond to her son.  Patient also has been eating less and has needed to be carried upstairs and on/off the toilet an increasing amount of times since the winter.  Patient's confusion was in relation to daily routines rather than forgetting names and places.  Patient has increased tiredness and weakness/lack of energy - not being able to keep her head up, per son.  The symptoms are worse when the patient is tired, in the morning and at night.      # metabolic encephalopathy most likely 2/2 citrobacter amalonticu complex bacteremia.    CT head negative.   CT abd/pelvis: negative for any abscess but possible pneumonia.   Echo negative for vegetation.   Repeat blood culture 5/19 negative.   ID recommended sacral ulcer wound eval. DONE today.   ID recommended to obtain AFB stain to rule out NTM infection ( no isolation needed). pt not able to produce sputum will try with nebulizer to induce sputum.   s/p cefepime, switched based on sensitivity to Ceftriaxone 2g IV daily 5/19- When closer to discharge, can be switched to PO levofloxacin 500mg q 24 from 5/19/24 for 10 days.    -Elevated LDH. Follow up with fungitell. Started on atovaquone 1500 mg daily. Can be continued until prednisone is tapered below 20mg once a day.   -Off loading to prevent pressure sores and preventive measures to avoid aspiration.       # Severe malnutrition.   Son who is the HCP is controlling his mother diet. Stated that she can not eat most of the food as that causes her a lot of pain and discomfort. advised the son on the importance of proper nutrtion to fight the infection, showed understanding and has been more cooperative  Nutrition consulted and recommended [employer].   Fiber/Residue Restricted (LOWFIBER)  DASH/TLC {Sodium & Cholesterol Restricted} (DASH)    # sacrococcygeal pressure wound.   Size: ~3x2  Tissue Description :    Red,   Wound Exudate : None   Wound Edge:  Irregular   Periwound Condition  : Macerated   Clean  wound with soap and water, pat dry then apply triad hydrophilic  dressing   Assess wound daily and inform primary provider of any changes     # COPD on home o2  - pulm consult; change in mental status unlikely due to COPD.   - Douneb q4h.   - Spiriva   - Budesonide   - prednisone 20 mg daily for 5 days.   - o2   2L- 3L liters - home dose   - c/w Spiriva / singular / Advair ( symbicort )   - c/w home pred dose     # Anemia with macrocytosis   hgb stable   - folate and b12 levels are normal.      # IBS- constipation:   - Miralax / senna/ Dulcolax   - tap water enema prn   - c/w bentyl     # anxiety / MDD  - c/w home Remeron / Buspar     # HTn   - c/w acei  - low salt diet     DNR/ DNI   molst in chart     pending physical therapy and improvement nutrition status.

## 2024-05-21 NOTE — CONSULT NOTE ADULT - REASON FOR ADMISSION
increased confusion, altered mental status

## 2024-05-21 NOTE — CONSULT NOTE ADULT - ASSESSMENT
Assessment:  Patient  Vincent score vent dependent , current  pressore usage  ,  on sedation obese /frial                            High risk for pressur injury developemnt or progression   Skin assessed-  Wound #1  Type and location :   Size:   Tissue Description :   Wound Exudate :   Wound Edge:   Periwound Condition          Other Etiology:  [ ] Aterial  [ ] Venous   [ ] Surgical Incision  [ ] Other: ________      Woun/SKin Recs    Pressure  injury  preventive  measures  Skin  and incontince care   Assess wound and inform primary provider of any changes   Case discussed with primary Rn/ Md   Wound/ ostomy specialist  to f/u as needed     Offloading: [ ] Frequent position changes [ ] Devices/Equipment  Cleansing: [ ] Saline [ ] Soap/Water [ ] Other: ______  Topicals: [ ] Barrier Cream [ ] Antimicrobial [ ] Enzymatic Wound Debridement [] Moist  wound Healig   Dressings: [ ] Dry, sterile [ ] Allevyn  Foam [ ] Absorbant Pads [ ] Collagenase    Other Recs.   Per Primary team   Total time for bedside assessment  , review of medical records  and  discussion of plan of care with primary team greater than 35 min                                Skin assessed-  B/L heel dry and intact                                               High risk for pressure injury development or progression     Wound #1  Type and location :  Pressure  injury  stage  two to sacrococcygeal   Size: ~3x2  Tissue Description :    Red,   Wound Exudate : None    Wound Edge:  Irregular   Periwound Condition  : Macerated             Wound and skin care recs.   Clean  wound with soap and water, pat dry then apply triad hydrophilic  dressing   Pressure  injury  preventive  measures  Skin  and incontinence care   Assess wound and inform primary provider of any changes   Case discussed with primary Rn  Wound/ ostomy specialist  to f/u as needed     Offloading: [x ] Frequent position changes [ ] Devices/Equipment  Cleansing: [ ] Saline [x ] Soap/Water [ ] Other: ______  Topicals: [x ] Barrier Cream [ ] Antimicrobial [ ] Enzymatic Wound Debridement [] Moist  wound Healig   Dressings: [ ] Dry, sterile [ ] Allevyn  Foam [ ] Absorbant Pads [ ] Collagenase    Other Recs.   Per Primary team   Total time for bedside assessment  , review of medical records  and  discussion of plan of care with primary team greater than 35 min

## 2024-05-22 LAB
ALBUMIN SERPL ELPH-MCNC: 2.1 G/DL — LOW (ref 3.5–5.2)
ALP SERPL-CCNC: 163 U/L — HIGH (ref 30–115)
ALT FLD-CCNC: 152 U/L — HIGH (ref 0–41)
ANION GAP SERPL CALC-SCNC: 6 MMOL/L — LOW (ref 7–14)
AST SERPL-CCNC: 59 U/L — HIGH (ref 0–41)
BILIRUB SERPL-MCNC: 0.2 MG/DL — SIGNIFICANT CHANGE UP (ref 0.2–1.2)
BUN SERPL-MCNC: 29 MG/DL — HIGH (ref 10–20)
CALCIUM SERPL-MCNC: 7.8 MG/DL — LOW (ref 8.4–10.5)
CHLORIDE SERPL-SCNC: 107 MMOL/L — SIGNIFICANT CHANGE UP (ref 98–110)
CO2 SERPL-SCNC: 25 MMOL/L — SIGNIFICANT CHANGE UP (ref 17–32)
CREAT SERPL-MCNC: 0.7 MG/DL — SIGNIFICANT CHANGE UP (ref 0.7–1.5)
EGFR: 88 ML/MIN/1.73M2 — SIGNIFICANT CHANGE UP
FUNGITELL: 88 PG/ML — HIGH
GGT SERPL-CCNC: 154 U/L — HIGH (ref 1–40)
GLUCOSE SERPL-MCNC: 74 MG/DL — SIGNIFICANT CHANGE UP (ref 70–99)
HCT VFR BLD CALC: 28.2 % — LOW (ref 37–47)
HGB BLD-MCNC: 8.7 G/DL — LOW (ref 12–16)
MAGNESIUM SERPL-MCNC: 1.8 MG/DL — SIGNIFICANT CHANGE UP (ref 1.8–2.4)
MCHC RBC-ENTMCNC: 30.9 G/DL — LOW (ref 32–37)
MCHC RBC-ENTMCNC: 31.4 PG — HIGH (ref 27–31)
MCV RBC AUTO: 101.8 FL — HIGH (ref 81–99)
NRBC # BLD: 0 /100 WBCS — SIGNIFICANT CHANGE UP (ref 0–0)
PLATELET # BLD AUTO: 182 K/UL — SIGNIFICANT CHANGE UP (ref 130–400)
PMV BLD: 10.2 FL — SIGNIFICANT CHANGE UP (ref 7.4–10.4)
POTASSIUM SERPL-MCNC: 4.5 MMOL/L — SIGNIFICANT CHANGE UP (ref 3.5–5)
POTASSIUM SERPL-SCNC: 4.5 MMOL/L — SIGNIFICANT CHANGE UP (ref 3.5–5)
PROT SERPL-MCNC: 5.1 G/DL — LOW (ref 6–8)
RBC # BLD: 2.77 M/UL — LOW (ref 4.2–5.4)
RBC # FLD: 15.2 % — HIGH (ref 11.5–14.5)
SODIUM SERPL-SCNC: 138 MMOL/L — SIGNIFICANT CHANGE UP (ref 135–146)
WBC # BLD: 13.74 K/UL — HIGH (ref 4.8–10.8)
WBC # FLD AUTO: 13.74 K/UL — HIGH (ref 4.8–10.8)

## 2024-05-22 PROCEDURE — 99232 SBSQ HOSP IP/OBS MODERATE 35: CPT

## 2024-05-22 PROCEDURE — 76705 ECHO EXAM OF ABDOMEN: CPT | Mod: 26

## 2024-05-22 RX ORDER — LACTULOSE 10 G/15ML
10 SOLUTION ORAL EVERY 4 HOURS
Refills: 0 | Status: COMPLETED | OUTPATIENT
Start: 2024-05-22 | End: 2024-05-23

## 2024-05-22 RX ORDER — TAMSULOSIN HYDROCHLORIDE 0.4 MG/1
0.4 CAPSULE ORAL AT BEDTIME
Refills: 0 | Status: DISCONTINUED | OUTPATIENT
Start: 2024-05-23 | End: 2024-05-23

## 2024-05-22 RX ORDER — TAMSULOSIN HYDROCHLORIDE 0.4 MG/1
0.8 CAPSULE ORAL AT BEDTIME
Refills: 0 | Status: DISCONTINUED | OUTPATIENT
Start: 2024-05-22 | End: 2024-05-22

## 2024-05-22 RX ORDER — SACCHAROMYCES BOULARDII 250 MG
250 POWDER IN PACKET (EA) ORAL
Refills: 0 | Status: DISCONTINUED | OUTPATIENT
Start: 2024-05-22 | End: 2024-05-23

## 2024-05-22 RX ORDER — TAMSULOSIN HYDROCHLORIDE 0.4 MG/1
0.8 CAPSULE ORAL ONCE
Refills: 0 | Status: COMPLETED | OUTPATIENT
Start: 2024-05-22 | End: 2024-05-22

## 2024-05-22 RX ORDER — POLYETHYLENE GLYCOL 3350 17 G/17G
17 POWDER, FOR SOLUTION ORAL EVERY 12 HOURS
Refills: 0 | Status: DISCONTINUED | OUTPATIENT
Start: 2024-05-22 | End: 2024-05-23

## 2024-05-22 RX ORDER — PANTOPRAZOLE SODIUM 20 MG/1
40 TABLET, DELAYED RELEASE ORAL
Refills: 0 | Status: DISCONTINUED | OUTPATIENT
Start: 2024-05-22 | End: 2024-05-23

## 2024-05-22 RX ORDER — METRONIDAZOLE 500 MG
500 TABLET ORAL EVERY 12 HOURS
Refills: 0 | Status: DISCONTINUED | OUTPATIENT
Start: 2024-05-22 | End: 2024-05-23

## 2024-05-22 RX ADMIN — DORZOLAMIDE HYDROCHLORIDE 1 DROP(S): 20 SOLUTION/ DROPS OPHTHALMIC at 05:21

## 2024-05-22 RX ADMIN — DORZOLAMIDE HYDROCHLORIDE 1 DROP(S): 20 SOLUTION/ DROPS OPHTHALMIC at 21:47

## 2024-05-22 RX ADMIN — LACTULOSE 10 GRAM(S): 10 SOLUTION ORAL at 18:13

## 2024-05-22 RX ADMIN — LISINOPRIL 10 MILLIGRAM(S): 2.5 TABLET ORAL at 05:21

## 2024-05-22 RX ADMIN — Medication 1 DROP(S): at 18:28

## 2024-05-22 RX ADMIN — ATOVAQUONE 1500 MILLIGRAM(S): 750 SUSPENSION ORAL at 11:07

## 2024-05-22 RX ADMIN — MONTELUKAST 10 MILLIGRAM(S): 4 TABLET, CHEWABLE ORAL at 11:06

## 2024-05-22 RX ADMIN — SENNA PLUS 2 TABLET(S): 8.6 TABLET ORAL at 21:46

## 2024-05-22 RX ADMIN — SODIUM CHLORIDE 50 MILLILITER(S): 9 INJECTION, SOLUTION INTRAVENOUS at 21:50

## 2024-05-22 RX ADMIN — LACTULOSE 10 GRAM(S): 10 SOLUTION ORAL at 21:46

## 2024-05-22 RX ADMIN — BUDESONIDE AND FORMOTEROL FUMARATE DIHYDRATE 2 PUFF(S): 160; 4.5 AEROSOL RESPIRATORY (INHALATION) at 07:39

## 2024-05-22 RX ADMIN — MIRTAZAPINE 7.5 MILLIGRAM(S): 45 TABLET, ORALLY DISINTEGRATING ORAL at 21:45

## 2024-05-22 RX ADMIN — Medication 81 MILLIGRAM(S): at 11:06

## 2024-05-22 RX ADMIN — Medication 10 MILLIGRAM(S): at 13:05

## 2024-05-22 RX ADMIN — Medication 20 MILLIGRAM(S): at 08:54

## 2024-05-22 RX ADMIN — CEFTRIAXONE 100 MILLIGRAM(S): 500 INJECTION, POWDER, FOR SOLUTION INTRAMUSCULAR; INTRAVENOUS at 13:04

## 2024-05-22 RX ADMIN — DORZOLAMIDE HYDROCHLORIDE 1 DROP(S): 20 SOLUTION/ DROPS OPHTHALMIC at 13:05

## 2024-05-22 RX ADMIN — Medication 5 MILLIGRAM(S): at 16:33

## 2024-05-22 RX ADMIN — TIOTROPIUM BROMIDE 2 PUFF(S): 18 CAPSULE ORAL; RESPIRATORY (INHALATION) at 07:38

## 2024-05-22 RX ADMIN — Medication 500 MILLIGRAM(S): at 18:29

## 2024-05-22 RX ADMIN — Medication 10 MILLIGRAM(S): at 13:20

## 2024-05-22 RX ADMIN — Medication 500 MILLIGRAM(S): at 11:06

## 2024-05-22 RX ADMIN — Medication 1 DROP(S): at 05:21

## 2024-05-22 RX ADMIN — POLYETHYLENE GLYCOL 3350 17 GRAM(S): 17 POWDER, FOR SOLUTION ORAL at 05:20

## 2024-05-22 RX ADMIN — Medication 250 MILLIGRAM(S): at 18:48

## 2024-05-22 RX ADMIN — Medication 10 MILLIGRAM(S): at 21:43

## 2024-05-22 RX ADMIN — TAMSULOSIN HYDROCHLORIDE 0.8 MILLIGRAM(S): 0.4 CAPSULE ORAL at 18:14

## 2024-05-22 RX ADMIN — ENOXAPARIN SODIUM 40 MILLIGRAM(S): 100 INJECTION SUBCUTANEOUS at 18:16

## 2024-05-22 RX ADMIN — Medication 3 MILLIGRAM(S): at 21:45

## 2024-05-22 NOTE — PROGRESS NOTE ADULT - NUTRITIONAL ASSESSMENT
This patient has been assessed with a concern for Malnutrition and has been determined to have a diagnosis/diagnoses of Severe protein-calorie malnutrition and Underweight (BMI < 19).    This patient is being managed with:   Diet Regular-  Fiber/Residue Restricted (LOWFIBER)  DASH/TLC {Sodium & Cholesterol Restricted} (DASH)  Supplement Feeding Modality:  Oral  Ensure Enlive Cans or Servings Per Day:  1       Frequency:  Three Times a day  Entered: May 19 2024 12:02PM  
This patient has been assessed with a concern for Malnutrition and has been determined to have a diagnosis/diagnoses of Severe protein-calorie malnutrition and Underweight (BMI < 19).    This patient is being managed with:   Diet Regular-  Fiber/Residue Restricted (LOWFIBER)  DASH/TLC {Sodium & Cholesterol Restricted} (DASH)  Supplement Feeding Modality:  Oral  Ensure Enlive Cans or Servings Per Day:  1       Frequency:  Three Times a day  Entered: May 19 2024 12:02PM  
This patient has been assessed with a concern for Malnutrition and has been determined to have a diagnosis/diagnoses of Severe protein-calorie malnutrition and Underweight (BMI < 19).    This patient is being managed with:   Diet Regular-  Supplement Feeding Modality:  Oral  Ensure Enlive Cans or Servings Per Day:  1       Frequency:  Three Times a day  Entered: May 18 2024 12:08PM  
This patient has been assessed with a concern for Malnutrition and has been determined to have a diagnosis/diagnoses of Severe protein-calorie malnutrition and Underweight (BMI < 19).    This patient is being managed with:   Diet Regular-  Supplement Feeding Modality:  Oral  Entered: May 17 2024  9:58AM  
This patient has been assessed with a concern for Malnutrition and has been determined to have a diagnosis/diagnoses of Severe protein-calorie malnutrition and Underweight (BMI < 19).    This patient is being managed with:   Diet Regular-  Fiber/Residue Restricted (LOWFIBER)  DASH/TLC {Sodium & Cholesterol Restricted} (DASH)  Supplement Feeding Modality:  Oral  Ensure Enlive Cans or Servings Per Day:  1       Frequency:  Three Times a day  Entered: May 19 2024 12:02PM  
This patient has been assessed with a concern for Malnutrition and has been determined to have a diagnosis/diagnoses of Severe protein-calorie malnutrition and Underweight (BMI < 19).    This patient is being managed with:   Diet Regular-  Fiber/Residue Restricted (LOWFIBER)  DASH/TLC {Sodium & Cholesterol Restricted} (DASH)  Supplement Feeding Modality:  Oral  Ensure Enlive Cans or Servings Per Day:  1       Frequency:  Three Times a day  Entered: May 19 2024 12:02PM

## 2024-05-22 NOTE — PROGRESS NOTE ADULT - ASSESSMENT
May 21, 2019      [facility] [address], [address]  [place] [address]  Phone: [phone]  Fax: [phone]       Patient: Nagi Kim   YOB: 2015  Date of Visit: 05/21/2019    To Whom It May Concern:    Teresita Kim  was at [facility] on 05/21/2019. He may return to work/school on 5/22/19 with no restrictions. If you have any questions or concerns, or if I can be of further assistance, please do not hesitate to contact me.    Sincerely,    Gaston nAdrews PA-C      78 year old  female brought in by ambulance today for an episode of unresponsiveness and increased confusion this morning.  Family states that the patient has been taking Prednisone since January for COPD and IBS and attributes the change in mental status to a side effect of the steroid.  Patient was disoriented and confused this morning and could not respond to her son.  Patient also has been eating less and has needed to be carried upstairs and on/off the toilet an increasing amount of times since the winter.  Patient's confusion was in relation to daily routines rather than forgetting names and places.  Patient has increased tiredness and weakness/lack of energy - not being able to keep her head up, per son.  The symptoms are worse when the patient is tired, in the morning and at night.      Metabolic encephalopathy most likely secondary to  citrobacter amalonticu complex bacteremia.    CT head negative, CT abd/pelvis: negative for any abscess but possible pneumonia.   Echo negative for vegetation, Repeat blood culture 5/19 negative.   ID recommended to obtain AFB stain to rule out NTM infection ( no isolation needed). pt not able to produce sputum will try with nebulizer to induce sputum.   Continue with  Ceftriaxone 2g IV daily 5/19- When closer to discharge, can be switched to PO levofloxacin 500mg q 24 from 5/19/24 for 10 days.    Elevated LDH. Follow up with fungitell. Started on atovaquone 1500 mg daily.   Can be continued until prednisone is tapered below 20mg once a day   Off loading to prevent pressure sores and preventive measures to avoid aspiration.     Transaminitis likely Cholestasis from Antibiotics   C/W Mild IVF F/up GGT, acute Hepatic panel and Liver US   -Trend for now     Severe malnutrition with sacrococcygeal pressure wound.    Nutrition and Wound care Nurse  recs appreciated  Continue with Wound care      Acute Urinary Retention: c/w Potter catheter and Flomax  -TOV in NH when more ambulatory or within 3 days or starting Flomax  ( 5/25)     COPD on Supplemental home oxygen   -Continue with Nebulizers and inhalers   - prednisone 20 mg daily for 5 days.   - o2   2L- 3L liters - home dose     Anemia with macrocytosis : hgb stable   Likely from chronic Disease   - folate and b12 levels are normal  -Hematology F/up O/P     IBS- constipation  KUB with Large stool burden in colon and rectum   Continue aggressive bowel regimen and bentyl     Anxiety +  MDD:  c/w home Remeron / Buspar   Hypertension: Continue with ACEi and DASH    DVT PPX: lovenox  GI PPX: PPI   DNR/ DNI   Dispo: from Home   Anticipate D/C in AM, after Transaminitis w/up

## 2024-05-23 ENCOUNTER — TRANSCRIPTION ENCOUNTER (OUTPATIENT)
Age: 79
End: 2024-05-23

## 2024-05-23 VITALS — TEMPERATURE: 99 F | OXYGEN SATURATION: 96 %

## 2024-05-23 LAB
ALBUMIN SERPL ELPH-MCNC: 1.8 G/DL — LOW (ref 3.5–5.2)
ALP SERPL-CCNC: 175 U/L — HIGH (ref 30–115)
ALT FLD-CCNC: 122 U/L — HIGH (ref 0–41)
ANION GAP SERPL CALC-SCNC: 10 MMOL/L — SIGNIFICANT CHANGE UP (ref 7–14)
AST SERPL-CCNC: 49 U/L — HIGH (ref 0–41)
BILIRUB SERPL-MCNC: <0.2 MG/DL — SIGNIFICANT CHANGE UP (ref 0.2–1.2)
BUN SERPL-MCNC: 29 MG/DL — HIGH (ref 10–20)
CALCIUM SERPL-MCNC: 7.6 MG/DL — LOW (ref 8.4–10.5)
CHLORIDE SERPL-SCNC: 106 MMOL/L — SIGNIFICANT CHANGE UP (ref 98–110)
CO2 SERPL-SCNC: 24 MMOL/L — SIGNIFICANT CHANGE UP (ref 17–32)
CREAT SERPL-MCNC: 0.8 MG/DL — SIGNIFICANT CHANGE UP (ref 0.7–1.5)
EGFR: 75 ML/MIN/1.73M2 — SIGNIFICANT CHANGE UP
GLUCOSE SERPL-MCNC: 122 MG/DL — HIGH (ref 70–99)
HAV IGM SER-ACNC: SIGNIFICANT CHANGE UP
HBV CORE IGM SER-ACNC: SIGNIFICANT CHANGE UP
HBV SURFACE AG SER-ACNC: SIGNIFICANT CHANGE UP
HCT VFR BLD CALC: 27 % — LOW (ref 37–47)
HCV AB S/CO SERPL IA: 0.09 S/CO — SIGNIFICANT CHANGE UP (ref 0–0.99)
HCV AB SERPL-IMP: SIGNIFICANT CHANGE UP
HGB BLD-MCNC: 8.5 G/DL — LOW (ref 12–16)
MCHC RBC-ENTMCNC: 31.3 PG — HIGH (ref 27–31)
MCHC RBC-ENTMCNC: 31.5 G/DL — LOW (ref 32–37)
MCV RBC AUTO: 99.3 FL — HIGH (ref 81–99)
NRBC # BLD: 0 /100 WBCS — SIGNIFICANT CHANGE UP (ref 0–0)
PLATELET # BLD AUTO: 174 K/UL — SIGNIFICANT CHANGE UP (ref 130–400)
PMV BLD: 9.8 FL — SIGNIFICANT CHANGE UP (ref 7.4–10.4)
POTASSIUM SERPL-MCNC: 3.9 MMOL/L — SIGNIFICANT CHANGE UP (ref 3.5–5)
POTASSIUM SERPL-SCNC: 3.9 MMOL/L — SIGNIFICANT CHANGE UP (ref 3.5–5)
PROT SERPL-MCNC: 4.8 G/DL — LOW (ref 6–8)
RBC # BLD: 2.72 M/UL — LOW (ref 4.2–5.4)
RBC # FLD: 15.4 % — HIGH (ref 11.5–14.5)
SODIUM SERPL-SCNC: 140 MMOL/L — SIGNIFICANT CHANGE UP (ref 135–146)
WBC # BLD: 14.49 K/UL — HIGH (ref 4.8–10.8)
WBC # FLD AUTO: 14.49 K/UL — HIGH (ref 4.8–10.8)

## 2024-05-23 PROCEDURE — 99239 HOSP IP/OBS DSCHRG MGMT >30: CPT

## 2024-05-23 RX ORDER — SIMETHICONE 80 MG/1
1 TABLET, CHEWABLE ORAL
Qty: 0 | Refills: 0 | DISCHARGE
Start: 2024-05-23

## 2024-05-23 RX ORDER — PANTOPRAZOLE SODIUM 20 MG/1
1 TABLET, DELAYED RELEASE ORAL
Qty: 0 | Refills: 0 | DISCHARGE
Start: 2024-05-23

## 2024-05-23 RX ORDER — SIMETHICONE 80 MG/1
80 TABLET, CHEWABLE ORAL EVERY 6 HOURS
Refills: 0 | Status: DISCONTINUED | OUTPATIENT
Start: 2024-05-23 | End: 2024-05-23

## 2024-05-23 RX ORDER — ATOVAQUONE 750 MG/5ML
5 SUSPENSION ORAL
Qty: 0 | Refills: 0 | DISCHARGE
Start: 2024-05-23

## 2024-05-23 RX ORDER — LEVOFLOXACIN 5 MG/ML
500 INJECTION, SOLUTION INTRAVENOUS EVERY 24 HOURS
Refills: 0 | Status: DISCONTINUED | OUTPATIENT
Start: 2024-05-23 | End: 2024-05-23

## 2024-05-23 RX ORDER — LEVOFLOXACIN 5 MG/ML
1 INJECTION, SOLUTION INTRAVENOUS
Qty: 0 | Refills: 0 | DISCHARGE
Start: 2024-05-23

## 2024-05-23 RX ORDER — SACCHAROMYCES BOULARDII 250 MG
1 POWDER IN PACKET (EA) ORAL
Qty: 0 | Refills: 0 | DISCHARGE
Start: 2024-05-23

## 2024-05-23 RX ORDER — SENNA PLUS 8.6 MG/1
2 TABLET ORAL
Qty: 0 | Refills: 0 | DISCHARGE
Start: 2024-05-23

## 2024-05-23 RX ORDER — HYDROXYZINE HCL 10 MG
25 TABLET ORAL ONCE
Refills: 0 | Status: COMPLETED | OUTPATIENT
Start: 2024-05-23 | End: 2024-05-23

## 2024-05-23 RX ORDER — METRONIDAZOLE 500 MG
1 TABLET ORAL
Qty: 0 | Refills: 0 | DISCHARGE
Start: 2024-05-23

## 2024-05-23 RX ORDER — LISINOPRIL 2.5 MG/1
1 TABLET ORAL
Refills: 0 | DISCHARGE

## 2024-05-23 RX ORDER — TAMSULOSIN HYDROCHLORIDE 0.4 MG/1
0.4 CAPSULE ORAL ONCE
Refills: 0 | Status: COMPLETED | OUTPATIENT
Start: 2024-05-23 | End: 2024-05-23

## 2024-05-23 RX ORDER — POLYETHYLENE GLYCOL 3350 17 G/17G
17 POWDER, FOR SOLUTION ORAL
Qty: 0 | Refills: 0 | DISCHARGE
Start: 2024-05-23

## 2024-05-23 RX ORDER — ATOVAQUONE 750 MG/5ML
750 SUSPENSION ORAL
Refills: 0 | Status: DISCONTINUED | OUTPATIENT
Start: 2024-05-23 | End: 2024-05-23

## 2024-05-23 RX ORDER — LANOLIN ALCOHOL/MO/W.PET/CERES
1 CREAM (GRAM) TOPICAL
Qty: 0 | Refills: 0 | DISCHARGE
Start: 2024-05-23

## 2024-05-23 RX ORDER — TAMSULOSIN HYDROCHLORIDE 0.4 MG/1
1 CAPSULE ORAL
Qty: 0 | Refills: 0 | DISCHARGE
Start: 2024-05-23

## 2024-05-23 RX ADMIN — Medication 500 MILLIGRAM(S): at 11:28

## 2024-05-23 RX ADMIN — Medication 250 MILLIGRAM(S): at 06:17

## 2024-05-23 RX ADMIN — Medication 20 MILLIGRAM(S): at 06:23

## 2024-05-23 RX ADMIN — Medication 500 MILLIGRAM(S): at 06:17

## 2024-05-23 RX ADMIN — PANTOPRAZOLE SODIUM 40 MILLIGRAM(S): 20 TABLET, DELAYED RELEASE ORAL at 06:15

## 2024-05-23 RX ADMIN — POLYETHYLENE GLYCOL 3350 17 GRAM(S): 17 POWDER, FOR SOLUTION ORAL at 06:15

## 2024-05-23 RX ADMIN — MONTELUKAST 10 MILLIGRAM(S): 4 TABLET, CHEWABLE ORAL at 11:29

## 2024-05-23 RX ADMIN — TAMSULOSIN HYDROCHLORIDE 0.4 MILLIGRAM(S): 0.4 CAPSULE ORAL at 13:30

## 2024-05-23 RX ADMIN — SIMETHICONE 80 MILLIGRAM(S): 80 TABLET, CHEWABLE ORAL at 11:28

## 2024-05-23 RX ADMIN — Medication 1 DROP(S): at 06:21

## 2024-05-23 RX ADMIN — CHLORHEXIDINE GLUCONATE 1 APPLICATION(S): 213 SOLUTION TOPICAL at 06:16

## 2024-05-23 RX ADMIN — Medication 25 MILLIGRAM(S): at 02:14

## 2024-05-23 RX ADMIN — Medication 10 MILLIGRAM(S): at 15:32

## 2024-05-23 RX ADMIN — LACTULOSE 10 GRAM(S): 10 SOLUTION ORAL at 01:30

## 2024-05-23 RX ADMIN — BUDESONIDE AND FORMOTEROL FUMARATE DIHYDRATE 2 PUFF(S): 160; 4.5 AEROSOL RESPIRATORY (INHALATION) at 07:29

## 2024-05-23 RX ADMIN — Medication 81 MILLIGRAM(S): at 11:28

## 2024-05-23 RX ADMIN — SODIUM CHLORIDE 50 MILLILITER(S): 9 INJECTION, SOLUTION INTRAVENOUS at 11:56

## 2024-05-23 RX ADMIN — Medication 10 MILLIGRAM(S): at 01:32

## 2024-05-23 RX ADMIN — TIOTROPIUM BROMIDE 2 PUFF(S): 18 CAPSULE ORAL; RESPIRATORY (INHALATION) at 07:32

## 2024-05-23 RX ADMIN — DORZOLAMIDE HYDROCHLORIDE 1 DROP(S): 20 SOLUTION/ DROPS OPHTHALMIC at 06:18

## 2024-05-23 RX ADMIN — Medication 10 MILLIGRAM(S): at 11:28

## 2024-05-23 NOTE — DISCHARGE NOTE PROVIDER - ATTENDING DISCHARGE PHYSICAL EXAMINATION:
GENERAL: No acute distress, appears Frail and deconditioned   HEAD:  Atraumatic, Normocephalic  EYES: conjunctiva and sclera clear  NECK: Supple, no JVD  CHEST/LUNG: CTAB; No wheezes  HEART: Regular rate and rhythm; No murmurs, rubs, or gallops  ABDOMEN: Soft, non-tender, MILDY distended; normal bowel sounds,  EXTREMITIES:  2+ peripheral pulses b/l, No clubbing, cyanosis, or edema  GENITOURINARY: Potter draining clear urine   NEUROLOGY: A&O x 1-2 no focal deficits, sleepy   SKIN: No rashes or lesions

## 2024-05-23 NOTE — DISCHARGE NOTE PROVIDER - DID THE PATIENT PRESENT WITH OR WAS TREATED FOR MALNUTRITION DURING THIS ADMISSION
Yes... Aklief Pregnancy And Lactation Text: It is unknown if this medication is safe to use during pregnancy.  It is unknown if this medication is excreted in breast milk.  Breastfeeding women should use the topical cream on the smallest area of the skin for the shortest time needed while breastfeeding.  Do not apply to nipple and areola.

## 2024-05-23 NOTE — PROGRESS NOTE ADULT - SUBJECTIVE AND OBJECTIVE BOX
JEAN BURNS  78y  Female       History of Present Illness: Patient was seen and examined today. Pt lying in bed, does not seem in any distress. family at the bedside. pt condition been worsening since last year.     Vital Signs Last 24 Hrs  T(C): 36.6 (17 May 2024 12:52), Max: 36.6 (17 May 2024 05:15)  T(F): 97.8 (17 May 2024 12:52), Max: 97.8 (17 May 2024 05:15)  HR: 66 (17 May 2024 12:52) (64 - 81)  BP: 157/71 (17 May 2024 12:52) (136/75 - 157/71)  BP(mean): --  RR: 18 (17 May 2024 12:52) (18 - 18)  SpO2: 94% (17 May 2024 12:52) (94% - 99%)    Parameters below as of 17 May 2024 12:52  Patient On (Oxygen Delivery Method): nasal cannula        PHYSICAL EXAM:  GENERAL: NAD, well-groomed, well-developed  HEENT - NC/AT, pupils equal and reactive to light,  ; Moist mucous membranes, Good dentition, No lesions  NECK: Supple, No JVD  CHEST/LUNG: Clear to auscultation bilaterally; No rales, rhonchi, wheezing  HEART: Regular rate and rhythm; No murmurs, rubs, or gallops  ABDOMEN: Soft, Nontender, Nondistended; Bowel sounds present  EXTREMITIES:  2+ Peripheral Pulses, No clubbing, cyanosis, or edema  NEURO:  No Focal deficits, sensory and motor intact        acetaminophen     Tablet .. 650 milliGRAM(s) Oral every 6 hours PRN  ascorbic acid 500 milliGRAM(s) Oral daily  aspirin  chewable 81 milliGRAM(s) Oral daily  benzonatate 100 milliGRAM(s) Oral three times a day PRN  bisacodyl 5 milliGRAM(s) Oral every 12 hours PRN  budesonide 160 MICROgram(s)/formoterol 4.5 MICROgram(s) Inhaler 2 Puff(s) Inhalation two times a day  busPIRone 20 milliGRAM(s) Oral <User Schedule>  busPIRone 10 milliGRAM(s) Oral <User Schedule>  cefepime   IVPB 2000 milliGRAM(s) IV Intermittent every 12 hours  dicyclomine 10 milliGRAM(s) Oral three times a day before meals PRN  dorzolamide 2%/timolol 0.5% Ophthalmic Solution 1 Drop(s) Both EYES two times a day  enoxaparin Injectable 40 milliGRAM(s) SubCutaneous every 24 hours  lisinopril 10 milliGRAM(s) Oral daily  mirtazapine 7.5 milliGRAM(s) Oral daily  montelukast 10 milliGRAM(s) Oral daily  ondansetron Injectable 4 milliGRAM(s) IV Push every 8 hours PRN  polyethylene glycol 3350 17 Gram(s) Oral daily PRN  predniSONE   Tablet 20 milliGRAM(s) Oral daily  senna 2 Tablet(s) Oral at bedtime  sodium chloride 0.9%. 1000 milliLiter(s) IV Continuous <Continuous>  tiotropium 2.5 MICROgram(s) Inhaler 2 Puff(s) Inhalation daily            
  KATY JEAN  78y  Female       History of Present Illness: Patient was seen and examined today. Pt lying in her bed, family at the bedside. she looks weak, only nodding her head. respond to verbal stimuli. does not seem in any pain.       Vital Signs Last 24 Hrs  T(C): 36.4 (20 May 2024 05:00), Max: 36.9 (19 May 2024 22:15)  T(F): 97.5 (20 May 2024 05:00), Max: 98.5 (19 May 2024 22:15)  HR: 67 (20 May 2024 06:48) (67 - 88)  BP: 135/77 (20 May 2024 06:48) (103/70 - 170/74)  BP(mean): --  RR: 18 (20 May 2024 05:00) (18 - 18)  SpO2: 100% (20 May 2024 05:00) (94% - 100%)    Parameters below as of 20 May 2024 05:00  Patient On (Oxygen Delivery Method): nasal cannula        PHYSICAL EXAM:  GENERAL: catechetic   HEENT - NC/AT, pupils equal and reactive to light,  ; Moist mucous membranes, Good dentition, No lesions  NECK: Supple, No JVD  CHEST/LUNG: Clear to auscultation bilaterally; No rales, rhonchi, wheezing  HEART: Regular rate and rhythm; No murmurs, rubs, or gallops  ABDOMEN: Soft, Nontender, Nondistended; Bowel sounds present  EXTREMITIES:  2+ Peripheral Pulses, No clubbing, cyanosis, or edema  NEURO:  No Focal deficits, sensory and motor intact        acetaminophen     Tablet .. 650 milliGRAM(s) Oral every 6 hours PRN  ascorbic acid 500 milliGRAM(s) Oral daily  aspirin  chewable 81 milliGRAM(s) Oral daily  atovaquone  Suspension 1500 milliGRAM(s) Oral daily  benzonatate 100 milliGRAM(s) Oral three times a day PRN  bisacodyl 5 milliGRAM(s) Oral every 12 hours PRN  budesonide 160 MICROgram(s)/formoterol 4.5 MICROgram(s) Inhaler 2 Puff(s) Inhalation two times a day  busPIRone 10 milliGRAM(s) Oral <User Schedule>  busPIRone 20 milliGRAM(s) Oral <User Schedule>  cefTRIAXone   IVPB 2000 milliGRAM(s) IV Intermittent every 24 hours  chlorhexidine 2% Cloths 1 Application(s) Topical <User Schedule>  dicyclomine 10 milliGRAM(s) Oral three times a day before meals PRN  dorzolamide 2% Ophthalmic Solution 1 Drop(s) Both EYES three times a day  enoxaparin Injectable 40 milliGRAM(s) SubCutaneous every 24 hours  guaifenesin/dextromethorphan Oral Liquid 5 milliLiter(s) Oral every 8 hours PRN  lisinopril 10 milliGRAM(s) Oral daily  melatonin 3 milliGRAM(s) Oral at bedtime  mirtazapine 7.5 milliGRAM(s) Oral <User Schedule>  montelukast 10 milliGRAM(s) Oral daily  ondansetron Injectable 4 milliGRAM(s) IV Push every 8 hours PRN  polyethylene glycol 3350 17 Gram(s) Oral daily PRN  senna 2 Tablet(s) Oral at bedtime  sodium chloride 0.9%. 1000 milliLiter(s) IV Continuous <Continuous>  timolol 0.5% Solution 1 Drop(s) Both EYES two times a day  tiotropium 2.5 MICROgram(s) Inhaler 2 Puff(s) Inhalation daily            
FARHAN BURNSANN  78y, Female    LOS  5d    INTERVAL EVENTS/HPI  - No acute events overnight  - T(F): , Max: 98.8 (05-21-24 @ 05:09)  - WBC Count: 9.76 (05-20-24 @ 05:45)  WBC Count: 12.37 (05-19-24 @ 15:39)  - Creatinine: 0.7 (05-20-24 @ 05:45)  Creatinine: 0.9 (05-19-24 @ 15:39)    REVIEW OF SYSTEMS:  CONSTITUTIONAL: No fever or chills  HEENT: No sore throat  RESPIRATORY: No cough, no shortness of breath  CARDIOVASCULAR: No chest pain or palpitations  GASTROINTESTINAL: No abdominal or epigastric pain  GENITOURINARY: No dysuria  NEUROLOGICAL: No headache/dizziness  MSK: No joint pain, erythema, or swelling; no back pain  SKIN: No itching, rashes  All other ROS negative except noted above    Prior hospital charts reviewed [Yes]  Primary team notes reviewed [Yes]  Other consultant notes reviewed [Yes]    ANTIMICROBIALS:   atovaquone  Suspension 1500 daily  cefTRIAXone   IVPB 2000 every 24 hours      OTHER MEDS: MEDICATIONS  (STANDING):  acetaminophen     Tablet .. 650 every 6 hours PRN  aspirin  chewable 81 daily  benzonatate 100 three times a day PRN  bisacodyl 5 every 12 hours PRN  budesonide 160 MICROgram(s)/formoterol 4.5 MICROgram(s) Inhaler 2 two times a day  busPIRone 10 <User Schedule>  busPIRone 20 <User Schedule>  dicyclomine 10 three times a day before meals PRN  enoxaparin Injectable 40 every 24 hours  guaifenesin/dextromethorphan Oral Liquid 5 every 8 hours PRN  lisinopril 10 daily  melatonin 3 at bedtime  mirtazapine 7.5 <User Schedule>  montelukast 10 daily  ondansetron Injectable 4 every 8 hours PRN  polyethylene glycol 3350 17 daily PRN  senna 2 at bedtime  tiotropium 2.5 MICROgram(s) Inhaler 2 daily      Vital Signs Last 24 Hrs  T(F): 98.8 (05-21-24 @ 05:09), Max: 98.8 (05-21-24 @ 05:09)    Vital Signs Last 24 Hrs  HR: 82 (05-21-24 @ 05:09) (60 - 82)  BP: 154/78 (05-21-24 @ 05:09) (137/66 - 154/78)  RR: 16 (05-21-24 @ 05:09)  SpO2: 95% (05-21-24 @ 08:20) (95% - 97%)  Wt(kg): --    EXAM:  GENERAL: Cachectic, on NC.   HEAD: No head lesions  NECK: Supple  CHEST/LUNG: Shallow breath sounds bilaterally  HEART: S1 S2  ABDOMEN: Soft, nontender  EXTREMITIES: No clubbing  NERVOUS SYSTEM: Alert and oriented to person  MSK: No joint erythema, swelling or pain  SKIN: No rashes or lesions, no superficial thrombophlebitis  Labs:                        8.5    9.76  )-----------( 158      ( 20 May 2024 05:45 )             27.9     05-20    143  |  111<H>  |  39<H>  ----------------------------<  81  5.6<H>   |  27  |  0.7    Ca    7.7<L>      20 May 2024 05:45  Mg     2.0     05-20    TPro  4.8<L>  /  Alb  2.1<L>  /  TBili  0.2  /  DBili  x   /  AST  55<H>  /  ALT  174<H>  /  AlkPhos  113  05-20      WBC Trend:  WBC Count: 9.76 (05-20-24 @ 05:45)  WBC Count: 12.37 (05-19-24 @ 15:39)  WBC Count: 10.97 (05-18-24 @ 08:52)  WBC Count: 9.55 (05-17-24 @ 09:30)      Creatine Trend:  Creatinine: 0.7 (05-20)  Creatinine: 0.9 (05-19)  Creatinine: 0.7 (05-18)  Creatinine: 0.7 (05-17)      Liver Biochemical Testing Trend:  Alanine Aminotransferase (ALT/SGPT): 174 *H* (05-20)  Alanine Aminotransferase (ALT/SGPT): 207 *H* (05-19)  Alanine Aminotransferase (ALT/SGPT): 222 *H* (05-18)  Alanine Aminotransferase (ALT/SGPT): 139 *H* (05-17)  Alanine Aminotransferase (ALT/SGPT): 135 *H* (05-16)  Aspartate Aminotransferase (AST/SGOT): 55 (05-20-24 @ 05:45)  Aspartate Aminotransferase (AST/SGOT): 64 (05-19-24 @ 15:39)  Aspartate Aminotransferase (AST/SGOT): 89 (05-18-24 @ 08:52)  Aspartate Aminotransferase (AST/SGOT): 72 (05-17-24 @ 09:30)  Aspartate Aminotransferase (AST/SGOT): 48 (05-16-24 @ 09:41)  Bilirubin Total: 0.2 (05-20)  Bilirubin Total: <0.2 (05-19)  Bilirubin Total: 0.3 (05-18)  Bilirubin Total: 0.2 (05-17)  Bilirubin Direct: <0.2 (05-17)      Trend LDH  05-18-24 @ 08:52  536<H>      Urinalysis Basic - ( 20 May 2024 05:45 )    Color: x / Appearance: x / SG: x / pH: x  Gluc: 81 mg/dL / Ketone: x  / Bili: x / Urobili: x   Blood: x / Protein: x / Nitrite: x   Leuk Esterase: x / RBC: x / WBC x   Sq Epi: x / Non Sq Epi: x / Bacteria: x        MICROBIOLOGY:    Female    Culture - Blood (collected 19 May 2024 15:39)  Source: .Blood None  Preliminary Report:    No growth at 24 hours    Culture - Blood (collected 19 May 2024 15:39)  Source: .Blood None  Preliminary Report:    No growth at 24 hours    Urinalysis with Rflx Culture (collected 16 May 2024 12:25)    Culture - Blood (collected 16 May 2024 09:40)  Source: .Blood Blood-Peripheral  Final Report:    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 and anaerobic bottles: Citrobacter amalonaticus complex    Direct identification is available within approximately 3-5    hours either by Blood Panel Multiplexed PCR or Direct    MALDI-TOF. Details: [email or web address]  Organism: Blood Culture PCR  Citrobacter amalonaticus complex  Organism: Citrobacter amalonaticus complex    Sensitivities:      Method Type: CHAPARRO      -  Ampicillin: R 16 These ampicillin results predict results for amoxicillin      -  Ampicillin/Sulbactam: S <=4/2      -  Aztreonam: S <=4      -  Cefazolin: R >16      -  Cefepime: S <=2      -  Cefoxitin: S <=8      -  Ceftriaxone: S <=1      -  Ciprofloxacin: S <=0.25      -  Ertapenem: S <=0.5      -  Gentamicin: S <=2      -  Imipenem: S <=1      -  Levofloxacin: S <=0.5      -  Meropenem: S <=1      -  Piperacillin/Tazobactam: S <=8      -  Tobramycin: S <=2      -  Trimethoprim/Sulfamethoxazole: S <=0.5/9.5  Organism: Blood Culture PCR    Sensitivities:      Method Type: PCR      -  Enterobacterales: Detec    Culture - Blood (collected 16 May 2024 09:40)  Source: .Blood Blood-Peripheral  Final Report:    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    Culture - Urine (collected 07 Jun 2022 11:18)  Source: Clean Catch Clean Catch (Midstream)  Final Report:    <10,000 CFU/mL Normal Urogenital Jaida      HIV-1/2 Combo Result: Nonreact (05-18-24 @ 08:52)        Cryptococcal Antigen - Serum: Negative (05-18-24 @ 08:52)              Cryptococcal Antigen - Serum: Negative (05-18 @ 08:52)        Procalcitonin: 0.92 (05-17)          Lactate Dehydrogenase, Serum: 536 (05-18)      Troponin T, High Sensitivity Result: 33 (05-16)  Troponin T, High Sensitivity Result: 36 (05-16)          RADIOLOGY & ADDITIONAL TESTS:  I have personally reviewed the relevant images.   CXR      CT    < from: Xray Kidney Ureter Bladder (05.20.24 @ 12:29) >    Single AP supine view of the abdomen was performed and submitted for   evaluation. Comparison is made to the CT scan of the abdomen/pelvis dated   May 17, 2024.    There is a non-obstructive bowel gas pattern with a large amount of stool   seen throughout the colon and rectum.    No masses are seen.    No abnormal calcifications are seen.    No free air is seen.    There are degenerative changes the axial skeleton.    Impression:    Nonobstructive bowel gas pattern with a large amount of stool seen   throughout the colon and rectum.    Bones as above.    --- End of Report ---    < end of copied text >  < from: Xray Chest 1 View- PORTABLE-Routine (Xray Chest 1 View- PORTABLE-Routine in AM.) (05.18.24 @ 09:16) >    Impression:    No interval change. Stable bilateral opacities.        --- End of Report ---    < end of copied text >  < from: CT Abdomen and Pelvis w/ IV Cont (05.17.24 @ 18:42) >  BOWEL: No bowel obstruction. Appendix is not definitively identified.   Sigmoid diverticulosis. Moderate amount of stool in thecolon with   prominent stool in the rectum may reflect evidence of constipation.    < end of copied text >      WEIGHT  Weight (kg): 45.4 (05-16-24 @ 08:42)      All available historical records have been reviewed      
FARHAN BURNSANN  78y, Female    LOS  4d    INTERVAL EVENTS/HPI  - No acute events overnight  - T(F): , Max: 98.5 (05-19-24 @ 22:15)  - Denies any worsening symptoms  - Tolerating medication  - WBC Count: 9.76 (05-20-24 @ 05:45)  WBC Count: 12.37 (05-19-24 @ 15:39)  - Creatinine: 0.7 (05-20-24 @ 05:45)  - Creatinine: 0.9 (05-19-24 @ 15:39)    REVIEW OF SYSTEMS:  CONSTITUTIONAL: No fever or chills  HEENT: No sore throat  RESPIRATORY: No cough, no shortness of breath  CARDIOVASCULAR: No chest pain or palpitations  GASTROINTESTINAL: No abdominal or epigastric pain  GENITOURINARY: No dysuria  NEUROLOGICAL: No headache/dizziness  MSK: No joint pain, erythema, or swelling; no back pain  SKIN: No itching, rashes  All other ROS negative except noted above    Prior hospital charts reviewed [Yes]  Primary team notes reviewed [Yes]  Other consultant notes reviewed [Yes]    ANTIMICROBIALS:   cefTRIAXone   IVPB 2000 every 24 hours    OTHER MEDS: MEDICATIONS  (STANDING):  acetaminophen     Tablet .. 650 every 6 hours PRN  aspirin  chewable 81 daily  benzonatate 100 three times a day PRN  bisacodyl 5 every 12 hours PRN  budesonide 160 MICROgram(s)/formoterol 4.5 MICROgram(s) Inhaler 2 two times a day  busPIRone 20 <User Schedule>  busPIRone 10 <User Schedule>  dicyclomine 10 three times a day before meals PRN  enoxaparin Injectable 40 every 24 hours  guaifenesin/dextromethorphan Oral Liquid 5 every 8 hours PRN  lisinopril 10 daily  melatonin 3 at bedtime  mirtazapine 7.5 <User Schedule>  montelukast 10 daily  ondansetron Injectable 4 every 8 hours PRN  polyethylene glycol 3350 17 daily PRN  senna 2 at bedtime  tiotropium 2.5 MICROgram(s) Inhaler 2 daily      Vital Signs Last 24 Hrs  T(F): 97.5 (05-20-24 @ 05:00), Max: 98.5 (05-19-24 @ 22:15)    Vital Signs Last 24 Hrs  HR: 67 (05-20-24 @ 06:48) (67 - 88)  BP: 135/77 (05-20-24 @ 06:48) (103/70 - 170/74)  RR: 18 (05-20-24 @ 05:00)  SpO2: 100% (05-20-24 @ 05:00) (94% - 100%)  Wt(kg): --    EXAM:  GENERAL: Cachectic, on NC.   HEAD: No head lesions  NECK: Supple  CHEST/LUNG: Shallow breath sounds bilaterally  HEART: S1 S2  ABDOMEN: Soft, nontender  EXTREMITIES: No clubbing  NERVOUS SYSTEM: Alert and oriented to person  MSK: No joint erythema, swelling or pain  SKIN: No rashes or lesions, no superficial thrombophlebitis    Labs:                        8.5    9.76  )-----------( 158      ( 20 May 2024 05:45 )             27.9     05-20    143  |  111<H>  |  39<H>  ----------------------------<  81  5.6<H>   |  27  |  0.7    Ca    7.7<L>      20 May 2024 05:45  Mg     2.0     05-20    TPro  4.8<L>  /  Alb  2.1<L>  /  TBili  0.2  /  DBili  x   /  AST  55<H>  /  ALT  174<H>  /  AlkPhos  113  05-20      WBC Trend:  WBC Count: 9.76 (05-20-24 @ 05:45)  WBC Count: 12.37 (05-19-24 @ 15:39)  WBC Count: 10.97 (05-18-24 @ 08:52)  WBC Count: 9.55 (05-17-24 @ 09:30)      Creatine Trend:  Creatinine: 0.7 (05-20)  Creatinine: 0.9 (05-19)  Creatinine: 0.7 (05-18)  Creatinine: 0.7 (05-17)      Liver Biochemical Testing Trend:  Alanine Aminotransferase (ALT/SGPT): 174 *H* (05-20)  Alanine Aminotransferase (ALT/SGPT): 207 *H* (05-19)  Alanine Aminotransferase (ALT/SGPT): 222 *H* (05-18)  Alanine Aminotransferase (ALT/SGPT): 139 *H* (05-17)  Alanine Aminotransferase (ALT/SGPT): 135 *H* (05-16)  Aspartate Aminotransferase (AST/SGOT): 55 (05-20-24 @ 05:45)  Aspartate Aminotransferase (AST/SGOT): 64 (05-19-24 @ 15:39)  Aspartate Aminotransferase (AST/SGOT): 89 (05-18-24 @ 08:52)  Aspartate Aminotransferase (AST/SGOT): 72 (05-17-24 @ 09:30)  Aspartate Aminotransferase (AST/SGOT): 48 (05-16-24 @ 09:41)  Bilirubin Total: 0.2 (05-20)  Bilirubin Total: <0.2 (05-19)  Bilirubin Total: 0.3 (05-18)  Bilirubin Total: 0.2 (05-17)  Bilirubin Direct: <0.2 (05-17)      Trend LDH  05-18-24 @ 08:52  536<H>      Urinalysis Basic - ( 20 May 2024 05:45 )    Color: x / Appearance: x / SG: x / pH: x  Gluc: 81 mg/dL / Ketone: x  / Bili: x / Urobili: x   Blood: x / Protein: x / Nitrite: x   Leuk Esterase: x / RBC: x / WBC x   Sq Epi: x / Non Sq Epi: x / Bacteria: x        MICROBIOLOGY:    Female    Urinalysis with Rflx Culture (collected 16 May 2024 12:25)    Culture - Blood (collected 16 May 2024 09:40)  Source: .Blood Blood-Peripheral  Final Report:    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 and anaerobic bottles: Citrobacter amalonaticus complex    Direct identification is available within approximately 3-5    hours either by Blood Panel Multiplexed PCR or Direct    MALDI-TOF. Details: [email or web address]  Organism: Blood Culture PCR  Citrobacter amalonaticus complex  Organism: Citrobacter amalonaticus complex    Sensitivities:      Method Type: CHAPARRO      -  Ampicillin: R 16 These ampicillin results predict results for amoxicillin      -  Ampicillin/Sulbactam: S <=4/2      -  Aztreonam: S <=4      -  Cefazolin: R >16      -  Cefepime: S <=2      -  Cefoxitin: S <=8      -  Ceftriaxone: S <=1      -  Ciprofloxacin: S <=0.25      -  Ertapenem: S <=0.5      -  Gentamicin: S <=2      -  Imipenem: S <=1      -  Levofloxacin: S <=0.5      -  Meropenem: S <=1      -  Piperacillin/Tazobactam: S <=8      -  Tobramycin: S <=2      -  Trimethoprim/Sulfamethoxazole: S <=0.5/9.5  Organism: Blood Culture PCR    Sensitivities:      Method Type: PCR      -  Enterobacterales: Detec    Culture - Blood (collected 16 May 2024 09:40)  Source: .Blood Blood-Peripheral  Preliminary Report:    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 bottle: Citrobacter amalonaticus complex    See previous culture [id number]    Growth in anaerobic bottle: Gram Negative Rods    Culture - Urine (collected 07 Jun 2022 11:18)  Source: Clean Catch Clean Catch (Midstream)  Final Report:    <10,000 CFU/mL Normal Urogenital Jaida      HIV-1/2 Combo Result: Nonreact (05-18-24 @ 08:52)        Cryptococcal Antigen - Serum: Negative (05-18-24 @ 08:52)      Cryptococcal Antigen - Serum: Negative (05-18 @ 08:52)        Procalcitonin: 0.92 (05-17)          Lactate Dehydrogenase, Serum: 536 (05-18)      Troponin T, High Sensitivity Result: 33 (05-16)  Troponin T, High Sensitivity Result: 36 (05-16)    Lactate, Blood: 3.3 (05-17 @ 09:30)        RADIOLOGY & ADDITIONAL TESTS:  I have personally reviewed the relevant images.   CXR      CT  CT Abdomen and Pelvis w/ IV Cont:   ACC: [id number] EXAM:  CT ABDOMEN AND PELVIS IC   ORDERED BY: FLOR COLLIER     PROCEDURE DATE:  05/17/2024          INTERPRETATION:  CLINICAL STATEMENT: Abdominal pain, fever, abscess    TECHNIQUE: Contiguous axial CT images were obtained of the abdomen and   pelvis following administration of 95 cc Omnipaque 350 intravenous   contrast (5 cc discarded). Oral contrast was not administered.   Reformatted images in the coronal and sagittal planes were acquired.    COMPARISON: CT abdomen/pelvis 9/90/23.      FINDINGS:    LOWER CHEST: Debris/secretions in the bibasilar distal airways. Left   lower lobe peribronchovascular thickening. Interval complete collapse of   the left lower lobe and small left pleural effusion.    LIVER: Unremarkable.    GALLBLADDER AND BILIARY TREE: Unremarkable.    SPLEEN: Unremarkable.    PANCREAS: Unremarkable.    ADRENAL GLANDS: Unremarkable.    KIDNEYS: Symmetric renal enhancement. No hydronephrosis. Bilateral renal   cysts and subcentimeter hypodensities too small to characterize.    PELVIC ORGANS: No definite pelvic mass. The urinary bladder appears   unremarkable.    BOWEL: No bowel obstruction. Appendix is not definitively identified.   Sigmoid diverticulosis. Moderate amount of stool in thecolon with   prominent stool in the rectum may reflect evidence of constipation.    PERITONEUM/MESENTERY: No free air. No ascites.    VASCULAR: Normal caliber aorta.    ABDOMINOPELVIC NODES: No enlarged abdominal or pelvic lymph nodes.    BONES/SOFT TISSUES: No acute osseous abnormality. Degenerative changes of   the spine.      IMPRESSION:      Moderate amount of stool in the colon with prominent stool in the rectum   may reflect evidence of constipation.    Interval left lower lobe collapse andsmall left pleural effusion.   Superimposed infectious process is not excluded.    --- End of Report ---            SALOME CRABTREE MD; Attending Radiologist  This document has been electronically signed. May 19 2024 10:14AM (05-17-24 @ 18:42)    < from: US Abdomen Upper Quadrant Right (05.17.24 @ 12:09) >  FINDINGS:  Liver: Within normal limits.  Bile ducts: Normal caliber. Common bile duct measures 6 mm.  Gallbladder: Bladder is poorly distended with no definite stones   identified.  Pancreas: Visualized portions are within normal limits.  Right kidney: 9.2 cm. No hydronephrosis.  Ascites: None.  IVC: Visualized portions are within normal limits.    Visualized aorta is atherosclerotic measuring up to 1.7 cm proximally.   Distal portion well-visualized.    IMPRESSION:  Limited by excessive bowel gas. Gallbladder collapsed with no definite   stones. No significant biliary ductal dilatation.    < end of copied text >        WEIGHT  Weight (kg): 45.4 (05-16-24 @ 08:42)  Creatinine: 0.7 mg/dL (05-20-24 @ 05:45)  Creatinine: 0.9 mg/dL (05-19-24 @ 15:39)      All available historical records have been reviewed      
FARHAN BURNSANN  78y, Female    LOS  7d    INTERVAL EVENTS/HPI  - No acute events overnight  - T(F): , Max: 98 (05-22-24 @ 21:20)  - Tolerating medication  - WBC Count: 14.49 (05-23-24 @ 08:30)  WBC Count: 13.74 (05-22-24 @ 06:25)  - Creatinine: 0.8 (05-23-24 @ 08:30)  Creatinine: 0.7 (05-22-24 @ 06:25)    REVIEW OF SYSTEMS:  CONSTITUTIONAL: No fever or chills  HEENT: No sore throat  RESPIRATORY: No cough, no shortness of breath  CARDIOVASCULAR: No chest pain or palpitations  GASTROINTESTINAL: No abdominal or epigastric pain  GENITOURINARY: No dysuria  NEUROLOGICAL: No headache/dizziness  MSK: No joint pain, erythema, or swelling; no back pain  SKIN: No itching, rashes  All other ROS negative except noted above    Prior hospital charts reviewed [Yes]  Primary team notes reviewed [Yes]  Other consultant notes reviewed [Yes]    ANTIMICROBIALS:   atovaquone  Suspension 750 two times a day  levoFLOXacin  Tablet 500 every 48 hours  metroNIDAZOLE    Tablet 500 every 12 hours      OTHER MEDS: MEDICATIONS  (STANDING):  acetaminophen     Tablet .. 650 every 6 hours PRN  aspirin  chewable 81 daily  benzonatate 100 three times a day PRN  bisacodyl 5 daily PRN  budesonide 160 MICROgram(s)/formoterol 4.5 MICROgram(s) Inhaler 2 two times a day  busPIRone 10 <User Schedule>  busPIRone 20 <User Schedule>  dicyclomine 10 three times a day before meals PRN  enoxaparin Injectable 40 every 24 hours  guaifenesin/dextromethorphan Oral Liquid 5 every 8 hours PRN  lisinopril 10 daily  melatonin 3 at bedtime  mirtazapine 7.5 <User Schedule>  montelukast 10 daily  ondansetron Injectable 4 every 8 hours PRN  pantoprazole    Tablet 40 before breakfast  polyethylene glycol 3350 17 every 12 hours  senna 2 at bedtime  simethicone 80 every 6 hours  tamsulosin 0.4 once  tiotropium 2.5 MICROgram(s) Inhaler 2 daily      Vital Signs Last 24 Hrs  T(F): 98 (05-23-24 @ 05:08), Max: 98.8 (05-21-24 @ 05:09)    Vital Signs Last 24 Hrs  HR: 70 (05-23-24 @ 08:35) (70 - 87)  BP: 100/52 (05-23-24 @ 08:35) (100/52 - 136/66)  RR: 20 (05-23-24 @ 05:08)  SpO2: 97% (05-23-24 @ 08:35) (96% - 98%)  Wt(kg): --    EXAM:  GENERAL: Cachectic, on NC.   HEAD: No head lesions  NECK: Supple  CHEST/LUNG: Shallow breath sounds bilaterally  HEART: S1 S2  ABDOMEN: Soft, nontender  EXTREMITIES: No clubbing  NERVOUS SYSTEM: Alert and oriented to person  MSK: No joint erythema, swelling or pain  SKIN: No rashes or lesions, no superficial thrombophlebitis    Labs:                        8.5    14.49 )-----------( 174      ( 23 May 2024 08:30 )             27.0     05-23    140  |  106  |  29<H>  ----------------------------<  122<H>  3.9   |  24  |  0.8    Ca    7.6<L>      23 May 2024 08:30  Mg     1.8     05-22    TPro  4.8<L>  /  Alb  1.8<L>  /  TBili  <0.2  /  DBili  x   /  AST  49<H>  /  ALT  122<H>  /  AlkPhos  175<H>  05-23      WBC Trend:  WBC Count: 14.49 (05-23-24 @ 08:30)  WBC Count: 13.74 (05-22-24 @ 06:25)  WBC Count: 11.69 (05-21-24 @ 08:30)  WBC Count: 9.76 (05-20-24 @ 05:45)      Creatine Trend:  Creatinine: 0.8 (05-23)  Creatinine: 0.7 (05-22)  Creatinine: 0.6 (05-21)  Creatinine: 0.7 (05-20)      Liver Biochemical Testing Trend:  Alanine Aminotransferase (ALT/SGPT): 122 *H* (05-23)  Alanine Aminotransferase (ALT/SGPT): 152 *H* (05-22)  Alanine Aminotransferase (ALT/SGPT): 155 *H* (05-21)  Alanine Aminotransferase (ALT/SGPT): 174 *H* (05-20)  Alanine Aminotransferase (ALT/SGPT): 207 *H* (05-19)  Aspartate Aminotransferase (AST/SGOT): 49 (05-23-24 @ 08:30)  Aspartate Aminotransferase (AST/SGOT): 59 (05-22-24 @ 06:25)  Aspartate Aminotransferase (AST/SGOT): 49 (05-21-24 @ 08:30)  Aspartate Aminotransferase (AST/SGOT): 55 (05-20-24 @ 05:45)  Aspartate Aminotransferase (AST/SGOT): 64 (05-19-24 @ 15:39)  Bilirubin Total: <0.2 (05-23)  Bilirubin Total: 0.2 (05-22)  Bilirubin Total: 0.2 (05-21)  Bilirubin Total: 0.2 (05-20)  Bilirubin Total: <0.2 (05-19)      Trend LDH  05-18-24 @ 08:52  536<H>      Urinalysis Basic - ( 23 May 2024 08:30 )    Color: x / Appearance: x / SG: x / pH: x  Gluc: 122 mg/dL / Ketone: x  / Bili: x / Urobili: x   Blood: x / Protein: x / Nitrite: x   Leuk Esterase: x / RBC: x / WBC x   Sq Epi: x / Non Sq Epi: x / Bacteria: x        MICROBIOLOGY:    Female    Culture - Blood (collected 19 May 2024 15:39)  Source: .Blood None  Preliminary Report:    No growth at 72 Hours    Culture - Blood (collected 19 May 2024 15:39)  Source: .Blood None  Preliminary Report:    No growth at 72 Hours    Urinalysis with Rflx Culture (collected 16 May 2024 12:25)    Culture - Blood (collected 16 May 2024 09:40)  Source: .Blood Blood-Peripheral  Final Report:    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 and anaerobic bottles: Citrobacter amalonaticus complex    Direct identification is available within approximately 3-5    hours either by Blood Panel Multiplexed PCR or Direct    MALDI-TOF. Details: [email or web address]  Organism: Blood Culture PCR  Citrobacter amalonaticus complex  Organism: Citrobacter amalonaticus complex    Sensitivities:      Method Type: CHAPARRO      -  Ampicillin: R 16 These ampicillin results predict results for amoxicillin      -  Ampicillin/Sulbactam: S <=4/2      -  Aztreonam: S <=4      -  Cefazolin: R >16      -  Cefepime: S <=2      -  Cefoxitin: S <=8      -  Ceftriaxone: S <=1      -  Ciprofloxacin: S <=0.25      -  Ertapenem: S <=0.5      -  Gentamicin: S <=2      -  Imipenem: S <=1      -  Levofloxacin: S <=0.5      -  Meropenem: S <=1      -  Piperacillin/Tazobactam: S <=8      -  Tobramycin: S <=2      -  Trimethoprim/Sulfamethoxazole: S <=0.5/9.5  Organism: Blood Culture PCR    Sensitivities:      Method Type: PCR      -  Enterobacterales: Detec    Culture - Blood (collected 16 May 2024 09:40)  Source: .Blood Blood-Peripheral  Final Report:    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 bottle: Citrobacter amalonaticus complex    See previous culture [id number]    Growth in anaerobic bottle: Gram Negative Rods Most closely resembling    Hungatella species "Susceptibilities not performed"    Please Note:************************************************    Hungatella species    may appear as gram negative rods in direct smears of clinical specimens.    Culture - Urine (collected 07 Jun 2022 11:18)  Source: Clean Catch Clean Catch (Midstream)  Final Report:    <10,000 CFU/mL Normal Urogenital Jaida      HIV-1/2 Combo Result: Nonreact (05-18-24 @ 08:52)        Cryptococcal Antigen - Serum: Negative (05-18-24 @ 08:52)              Cryptococcal Antigen - Serum: Negative (05-18 @ 08:52)  Fungitell: 88 pg/mL (05-18 @ 08:52)        Procalcitonin: 0.92 (05-17)          Lactate Dehydrogenase, Serum: 536 (05-18)              RADIOLOGY & ADDITIONAL TESTS:  I have personally reviewed the relevant images.   CXR      CT    < from: US Abdomen Upper Quadrant Right (05.22.24 @ 17:50) >  IMPRESSION:    Gallbladder is not visualized likely due to overlying bowel gas.    Partially visualized right pleural effusion. Likely small ascites.    < end of copied text >  < from: Xray Kidney Ureter Bladder (05.20.24 @ 12:29) >  Single AP supine view of the abdomen was performed and submitted for   evaluation. Comparison is made to the CT scan of the abdomen/pelvis dated   May 17, 2024.    There is a non-obstructive bowel gas pattern with a large amount of stool   seen throughout the colon and rectum.    No masses are seen.    No abnormal calcifications are seen.    No free air is seen.    There are degenerative changes the axial skeleton.    Impression:    Nonobstructive bowel gas pattern with a large amount of stool seen   throughout the colon and rectum.    Bones as above.    --- End of Report ---    < end of copied text >      WEIGHT  Weight (kg): 45.4 (05-16-24 @ 08:42)  Creatinine: 0.8 mg/dL (05-23-24 @ 08:30)      All available historical records have been reviewed      
Patient is a 78y old  Female who presents with a chief complaint of increased confusion, altered mental status (21 May 2024 16:34)    MEDICATIONS  (STANDING):  ascorbic acid 500 milliGRAM(s) Oral daily  aspirin  chewable 81 milliGRAM(s) Oral daily  atovaquone  Suspension 1500 milliGRAM(s) Oral daily  budesonide 160 MICROgram(s)/formoterol 4.5 MICROgram(s) Inhaler 2 Puff(s) Inhalation two times a day  busPIRone 20 milliGRAM(s) Oral <User Schedule>  busPIRone 10 milliGRAM(s) Oral <User Schedule>  cefTRIAXone   IVPB 2000 milliGRAM(s) IV Intermittent every 24 hours  chlorhexidine 2% Cloths 1 Application(s) Topical <User Schedule>  dorzolamide 2% Ophthalmic Solution 1 Drop(s) Both EYES three times a day  enoxaparin Injectable 40 milliGRAM(s) SubCutaneous every 24 hours  lactated ringers. 1000 milliLiter(s) (50 mL/Hr) IV Continuous <Continuous>  lactulose Syrup 10 Gram(s) Oral every 4 hours  lisinopril 10 milliGRAM(s) Oral daily  melatonin 3 milliGRAM(s) Oral at bedtime  metroNIDAZOLE    Tablet 500 milliGRAM(s) Oral every 12 hours  mirtazapine 7.5 milliGRAM(s) Oral <User Schedule>  montelukast 10 milliGRAM(s) Oral daily  senna 2 Tablet(s) Oral at bedtime  tamsulosin 0.8 milliGRAM(s) Oral once  timolol 0.5% Solution 1 Drop(s) Both EYES two times a day  tiotropium 2.5 MICROgram(s) Inhaler 2 Puff(s) Inhalation daily    MEDICATIONS  (PRN):  acetaminophen     Tablet .. 650 milliGRAM(s) Oral every 6 hours PRN Temp greater or equal to 38C (100.4F), Mild Pain (1 - 3)  benzonatate 100 milliGRAM(s) Oral three times a day PRN Cough  bisacodyl 5 milliGRAM(s) Oral every 12 hours PRN Constipation  dicyclomine 10 milliGRAM(s) Oral three times a day before meals PRN abd cramps  guaifenesin/dextromethorphan Oral Liquid 5 milliLiter(s) Oral every 8 hours PRN Cough  ondansetron Injectable 4 milliGRAM(s) IV Push every 8 hours PRN Nausea and/or Vomiting  polyethylene glycol 3350 17 Gram(s) Oral daily PRN Constipation      CAPILLARY BLOOD GLUCOSE    I&O's Summary    21 May 2024 07:01  -  22 May 2024 07:00  --------------------------------------------------------  IN: 950 mL / OUT: 700 mL / NET: 250 mL    22 May 2024 07:01  -  22 May 2024 16:40  --------------------------------------------------------  IN: 0 mL / OUT: 200 mL / NET: -200 mL        PHYSICAL EXAM:  Vital Signs Last 24 Hrs  T(C): 35.6 (22 May 2024 14:36), Max: 36.7 (21 May 2024 20:35)  T(F): 96.1 (22 May 2024 14:36), Max: 98 (21 May 2024 20:35)  HR: 87 (22 May 2024 14:36) (65 - 87)  BP: 109/52 (22 May 2024 14:36) (102/67 - 149/81)  BP(mean): 104 (22 May 2024 05:16) (104 - 104)  RR: 18 (22 May 2024 14:36) (18 - 18)  SpO2: 96% (22 May 2024 13:56) (96% - 99%)    Parameters below as of 22 May 2024 05:16  Patient On (Oxygen Delivery Method): nasal cannula  O2 Flow (L/min): 3      GENERAL: No acute distress, appears Frail and deconditioned   HEAD:  Atraumatic, Normocephalic  EYES: conjunctiva and sclera clear  NECK: Supple, no JVD  CHEST/LUNG: CTAB; No wheezes  HEART: Regular rate and rhythm; No murmurs, rubs, or gallops  ABDOMEN: Soft, non-tender, non-distended; normal bowel sounds, no organomegaly  EXTREMITIES:  2+ peripheral pulses b/l, No clubbing, cyanosis, or edema  GENITOURINARY: Potter draining clear urine   NEUROLOGY: A&O x 1-2 no focal deficits, sleepy   SKIN: No rashes or lesions    LABS:                        8.7    13.74 )-----------( 182      ( 22 May 2024 06:25 )             28.2     05-22    138  |  107  |  29<H>  ----------------------------<  74  4.5   |  25  |  0.7    Ca    7.8<L>      22 May 2024 06:25  Mg     1.8     05-22    TPro  5.1<L>  /  Alb  2.1<L>  /  TBili  0.2  /  DBili  x   /  AST  59<H>  /  ALT  152<H>  /  AlkPhos  163<H>  05-22      Urinalysis Basic - ( 22 May 2024 06:25 )    Color: x / Appearance: x / SG: x / pH: x  Gluc: 74 mg/dL / Ketone: x  / Bili: x / Urobili: x   Blood: x / Protein: x / Nitrite: x   Leuk Esterase: x / RBC: x / WBC x   Sq Epi: x / Non Sq Epi: x / Bacteria: x          
  JEAN BURNS  78y  Female       History of Present Illness: Patient was seen and examined today. Pt lying in bed, does not seem in any distress. Family at the bedside. family are concerned about her nutrition status as  the son is controlliing her diet and not allowing her to eat much. He also controlling her activities, interfering with physical therapist and nurses.     Vital Signs Last 24 Hrs  T(C): 36.6 (17 May 2024 12:52), Max: 36.6 (17 May 2024 05:15)  T(F): 97.8 (17 May 2024 12:52), Max: 97.8 (17 May 2024 05:15)  HR: 66 (17 May 2024 12:52) (64 - 81)  BP: 157/71 (17 May 2024 12:52) (136/75 - 157/71)  BP(mean): --  RR: 18 (17 May 2024 12:52) (18 - 18)  SpO2: 94% (17 May 2024 12:52) (94% - 99%)    Parameters below as of 17 May 2024 12:52  Patient On (Oxygen Delivery Method): nasal cannula        PHYSICAL EXAM:  GENERAL: NAD, well-groomed, well-developed  HEENT - NC/AT, pupils equal and reactive to light,  ; Moist mucous membranes, Good dentition, No lesions  NECK: Supple, No JVD  CHEST/LUNG: Clear to auscultation bilaterally; No rales, rhonchi, wheezing  HEART: Regular rate and rhythm; No murmurs, rubs, or gallops  ABDOMEN: Soft, Nontender, Nondistended; Bowel sounds present  EXTREMITIES:  2+ Peripheral Pulses, No clubbing, cyanosis, or edema  NEURO:  No Focal deficits, sensory and motor intact        acetaminophen     Tablet .. 650 milliGRAM(s) Oral every 6 hours PRN  ascorbic acid 500 milliGRAM(s) Oral daily  aspirin  chewable 81 milliGRAM(s) Oral daily  benzonatate 100 milliGRAM(s) Oral three times a day PRN  bisacodyl 5 milliGRAM(s) Oral every 12 hours PRN  budesonide 160 MICROgram(s)/formoterol 4.5 MICROgram(s) Inhaler 2 Puff(s) Inhalation two times a day  busPIRone 20 milliGRAM(s) Oral <User Schedule>  busPIRone 10 milliGRAM(s) Oral <User Schedule>  cefepime   IVPB 2000 milliGRAM(s) IV Intermittent every 12 hours  dicyclomine 10 milliGRAM(s) Oral three times a day before meals PRN  dorzolamide 2%/timolol 0.5% Ophthalmic Solution 1 Drop(s) Both EYES two times a day  enoxaparin Injectable 40 milliGRAM(s) SubCutaneous every 24 hours  lisinopril 10 milliGRAM(s) Oral daily  mirtazapine 7.5 milliGRAM(s) Oral daily  montelukast 10 milliGRAM(s) Oral daily  ondansetron Injectable 4 milliGRAM(s) IV Push every 8 hours PRN  polyethylene glycol 3350 17 Gram(s) Oral daily PRN  predniSONE   Tablet 20 milliGRAM(s) Oral daily  senna 2 Tablet(s) Oral at bedtime  sodium chloride 0.9%. 1000 milliLiter(s) IV Continuous <Continuous>  tiotropium 2.5 MICROgram(s) Inhaler 2 Puff(s) Inhalation daily            
  JEAN BURNS  78y  Female       History of Present Illness: Patient was seen and examined today. Pt lying in bed, does not seem in any distress. Family at the bedside. family are concerned about her nutrition status as  the son is controlling her diet and not allowing her to eat much. He also controlling her activities, interfering with physical therapist and nurses. Today she looks more alert and awake. Family also concerned about urine output.     Vital Signs Last 24 Hrs  T(C): 36.6 (17 May 2024 12:52), Max: 36.6 (17 May 2024 05:15)  T(F): 97.8 (17 May 2024 12:52), Max: 97.8 (17 May 2024 05:15)  HR: 66 (17 May 2024 12:52) (64 - 81)  BP: 157/71 (17 May 2024 12:52) (136/75 - 157/71)  BP(mean): --  RR: 18 (17 May 2024 12:52) (18 - 18)  SpO2: 94% (17 May 2024 12:52) (94% - 99%)    Parameters below as of 17 May 2024 12:52  Patient On (Oxygen Delivery Method): nasal cannula        PHYSICAL EXAM:  GENERAL: pale, catechetic   HEENT - NC/AT, pupils equal and reactive to light,  ; Moist mucous membranes, Good dentition, No lesions  NECK: Supple, No JVD  CHEST/LUNG: Clear to auscultation bilaterally; No rales, rhonchi, wheezing  HEART: Regular rate and rhythm; No murmurs, rubs, or gallops  ABDOMEN: Soft, Nontender, Nondistended; Bowel sounds present  EXTREMITIES:  2+ Peripheral Pulses, No clubbing, cyanosis, or edema  NEURO:  drowsy.         acetaminophen     Tablet .. 650 milliGRAM(s) Oral every 6 hours PRN  ascorbic acid 500 milliGRAM(s) Oral daily  aspirin  chewable 81 milliGRAM(s) Oral daily  benzonatate 100 milliGRAM(s) Oral three times a day PRN  bisacodyl 5 milliGRAM(s) Oral every 12 hours PRN  budesonide 160 MICROgram(s)/formoterol 4.5 MICROgram(s) Inhaler 2 Puff(s) Inhalation two times a day  busPIRone 20 milliGRAM(s) Oral <User Schedule>  busPIRone 10 milliGRAM(s) Oral <User Schedule>  cefepime   IVPB 2000 milliGRAM(s) IV Intermittent every 12 hours  dicyclomine 10 milliGRAM(s) Oral three times a day before meals PRN  dorzolamide 2%/timolol 0.5% Ophthalmic Solution 1 Drop(s) Both EYES two times a day  enoxaparin Injectable 40 milliGRAM(s) SubCutaneous every 24 hours  lisinopril 10 milliGRAM(s) Oral daily  mirtazapine 7.5 milliGRAM(s) Oral daily  montelukast 10 milliGRAM(s) Oral daily  ondansetron Injectable 4 milliGRAM(s) IV Push every 8 hours PRN  polyethylene glycol 3350 17 Gram(s) Oral daily PRN  predniSONE   Tablet 20 milliGRAM(s) Oral daily  senna 2 Tablet(s) Oral at bedtime  sodium chloride 0.9%. 1000 milliLiter(s) IV Continuous <Continuous>  tiotropium 2.5 MICROgram(s) Inhaler 2 Puff(s) Inhalation daily            
  JEAN BURNS  78y  Female       History of Present Illness: Patient was seen and examined today. Pt lying in her bed, family at the bedside. pt looks very weak, drowsy, easily awaken, answer questions. encouraged to eat more.       Vital Signs Last 24 Hrs  T(C): 36.4 (20 May 2024 05:00), Max: 36.9 (19 May 2024 22:15)  T(F): 97.5 (20 May 2024 05:00), Max: 98.5 (19 May 2024 22:15)  HR: 67 (20 May 2024 06:48) (67 - 88)  BP: 135/77 (20 May 2024 06:48) (103/70 - 170/74)  BP(mean): --  RR: 18 (20 May 2024 05:00) (18 - 18)  SpO2: 100% (20 May 2024 05:00) (94% - 100%)    Parameters below as of 20 May 2024 05:00  Patient On (Oxygen Delivery Method): nasal cannula        PHYSICAL EXAM:  GENERAL: catechetic   HEENT - NC/AT, pupils equal and reactive to light,  ; Moist mucous membranes, Good dentition, No lesions  NECK: Supple, No JVD  CHEST/LUNG: Clear to auscultation bilaterally; No rales, rhonchi, wheezing  HEART: Regular rate and rhythm; No murmurs, rubs, or gallops  ABDOMEN: Soft, Nontender, Nondistended; Bowel sounds present  EXTREMITIES:  2+ Peripheral Pulses, No clubbing, cyanosis, or edema  NEURO:  drowsy but easily awaken         acetaminophen     Tablet .. 650 milliGRAM(s) Oral every 6 hours PRN  ascorbic acid 500 milliGRAM(s) Oral daily  aspirin  chewable 81 milliGRAM(s) Oral daily  atovaquone  Suspension 1500 milliGRAM(s) Oral daily  benzonatate 100 milliGRAM(s) Oral three times a day PRN  bisacodyl 5 milliGRAM(s) Oral every 12 hours PRN  budesonide 160 MICROgram(s)/formoterol 4.5 MICROgram(s) Inhaler 2 Puff(s) Inhalation two times a day  busPIRone 10 milliGRAM(s) Oral <User Schedule>  busPIRone 20 milliGRAM(s) Oral <User Schedule>  cefTRIAXone   IVPB 2000 milliGRAM(s) IV Intermittent every 24 hours  chlorhexidine 2% Cloths 1 Application(s) Topical <User Schedule>  dicyclomine 10 milliGRAM(s) Oral three times a day before meals PRN  dorzolamide 2% Ophthalmic Solution 1 Drop(s) Both EYES three times a day  enoxaparin Injectable 40 milliGRAM(s) SubCutaneous every 24 hours  guaifenesin/dextromethorphan Oral Liquid 5 milliLiter(s) Oral every 8 hours PRN  lisinopril 10 milliGRAM(s) Oral daily  melatonin 3 milliGRAM(s) Oral at bedtime  mirtazapine 7.5 milliGRAM(s) Oral <User Schedule>  montelukast 10 milliGRAM(s) Oral daily  ondansetron Injectable 4 milliGRAM(s) IV Push every 8 hours PRN  polyethylene glycol 3350 17 Gram(s) Oral daily PRN  senna 2 Tablet(s) Oral at bedtime  sodium chloride 0.9%. 1000 milliLiter(s) IV Continuous <Continuous>  timolol 0.5% Solution 1 Drop(s) Both EYES two times a day  tiotropium 2.5 MICROgram(s) Inhaler 2 Puff(s) Inhalation daily

## 2024-05-23 NOTE — DISCHARGE NOTE NURSING/CASE MANAGEMENT/SOCIAL WORK - NSDCPEFALRISK_GEN_ALL_CORE
For information on Fall & Injury Prevention, visit: [email or web address] OR  [email or web address] OR  https://www.cdc.gov/steadi/patient.html

## 2024-05-23 NOTE — DISCHARGE NOTE PROVIDER - CARE PROVIDER_API CALL
Janell Dominguez  Geriatric Medicine  [address] [place] [id number]  Phone: [phone]  Fax: [phone]  Follow Up Time: 1 week   Janell Dominguez  Geriatric Medicine  [address] [place] [id number]  Phone: [phone]  Fax: [phone]  Follow Up Time: 1 week    Kumar Ng  Infectious Disease  [address] [facility] [address]  [place] [address]  Phone: [phone]  Fax: [phone]  Follow Up Time:

## 2024-05-23 NOTE — DISCHARGE NOTE PROVIDER - CARE PROVIDERS DIRECT ADDRESSES
[email or web address] [email or web address]

## 2024-05-23 NOTE — DISCHARGE NOTE NURSING/CASE MANAGEMENT/SOCIAL WORK - PATIENT PORTAL LINK FT
You can access the FollowMyHealth Patient Portal offered by [facility] by registering at the following website: [email or web address]. By joining [facility]’s FollowMyHealth portal, you will also be able to view your health information using other applications (apps) compatible with our system.

## 2024-05-23 NOTE — DISCHARGE NOTE PROVIDER - NSDCFUADDINST_GEN_ALL_CORE_FT
- Return to Emergency room if develop any persistent fever > 101, chills, tremors, persistent nausea/vomiting, severe pain not relieved by pain medication, inability to urinate, chest pains, difficulty breathing or any bleeding.   PLEASE DISCONTINUE BARNETT CATHETER FOR TRIAL OF VOID ON 5/25, PT WOULD HAVE COMPLETED 3 DOSES OF FLOMAX AT THAT TIME

## 2024-05-23 NOTE — DISCHARGE NOTE PROVIDER - NSDCMRMEDTOKEN_GEN_ALL_CORE_FT
Advair Diskus 250 mcg-50 mcg inhalation powder: 1 puff(s) inhaled 2 times a day  aspirin 81 mg oral tablet, chewable: 1 tab(s) orally once a day  benzonatate 100 mg oral capsule: 1 cap(s) orally 3 times a day  BuSpar 10 mg oral tablet: 2 tab(s) orally once a day  BuSpar 10 mg oral tablet: 1 tab(s) orally 2 times a day  dicyclomine 20 mg oral tablet: 1 tab(s) orally 4 times a day   dorzolamide-timolol 2%-0.5% preservative-free ophthalmic solution: 1 drop(s) to each affected eye 2 times a day  lisinopril 10 mg oral tablet: 1 tab(s) orally once a day  mirtazapine 7.5 mg oral tablet: 1 tab(s) orally once a day  montelukast 10 mg oral tablet: 1 tab(s) orally once a day  oxycodone-acetaminophen 5 mg-325 mg oral tablet: 1 tab(s) orally 4 times a day MDD:4  predniSONE 20 mg oral tablet: 2 tab(s) orally once a day  Prinivil 10 mg oral tablet: 1 tab(s) orally once a day  Spiriva 18 mcg inhalation capsule: 1 cap(s) inhaled once a day  Vitamin C 500 mg oral tablet: 1 tab(s) orally once a day  Vitamin D3 50 mcg (2000 intl units) oral tablet: 1 tab(s) orally once a day   Advair Diskus 250 mcg-50 mcg inhalation powder: 1 puff(s) inhaled 2 times a day  aspirin 81 mg oral tablet, chewable: 1 tab(s) orally once a day  atovaquone 750 mg/5 mL oral suspension: 5 milliliter(s) orally 2 times a day CONTINUE UNTIL EVALUATED BY INFECTIOUS DISEASE DOCTOR  bisacodyl 5 mg oral delayed release tablet: 1 tab(s) orally once a day As needed Constipation  BuSpar 10 mg oral tablet: 2 tab(s) orally once a day GIVE AT 7AM  BuSpar 10 mg oral tablet: 1 tab(s) orally 2 times a day GIVE AT 12NOON AND 7PM  dicyclomine 20 mg oral tablet: 1 tab(s) orally 4 times a day   dorzolamide-timolol 2%-0.5% preservative-free ophthalmic solution: 1 drop(s) to each affected eye 2 times a day  levoFLOXacin 500 mg oral tablet: 1 tab(s) orally every 48 hours START 5/25, ENDS 5/29  lisinopril 10 mg oral tablet: 1 tab(s) orally once a day  melatonin 3 mg oral tablet: 1 tab(s) orally once a day (at bedtime)  metroNIDAZOLE 500 mg oral tablet: 1 tab(s) orally every 12 hours TAKE ONE TAB TONIGHT TO COMPLETE TODAYS DOSE, COURSE ENDS: 5/29/2024  mirtazapine 7.5 mg oral tablet: 1 tab(s) orally once a day  montelukast 10 mg oral tablet: 1 tab(s) orally once a day  oxycodone-acetaminophen 5 mg-325 mg oral tablet: 1 tab(s) orally 4 times a day MDD:4  pantoprazole 40 mg oral delayed release tablet: 1 tab(s) orally once a day (before a meal)  polyethylene glycol 3350 oral powder for reconstitution: 17 gram(s) orally every 12 hours  saccharomyces boulardii lyo 250 mg oral capsule: 1 cap(s) orally 2 times a day  senna leaf extract oral tablet: 2 tab(s) orally once a day (at bedtime)  simethicone 80 mg oral tablet, chewable: 1 tab(s) orally every 6 hours  Spiriva 18 mcg inhalation capsule: 1 cap(s) inhaled once a day  tamsulosin 0.4 mg oral capsule: 1 cap(s) orally once a day (at bedtime)  Vitamin C 500 mg oral tablet: 1 tab(s) orally once a day  Vitamin D3 50 mcg (2000 intl units) oral tablet: 1 tab(s) orally once a day

## 2024-05-23 NOTE — DISCHARGE NOTE PROVIDER - NSDCFUSCHEDAPPT_GEN_ALL_CORE_FT
[facility] Physician [facility]  CARDIOLOGY [address]  Scheduled Appointment: 06/19/2024    Jairo Duff  [facility] Physician [facility]  PULMMED [address] [place] [address] Av  Scheduled Appointment: 07/23/2024

## 2024-05-23 NOTE — DISCHARGE NOTE PROVIDER - HOSPITAL COURSE
78 year old  female brought in by ambulance today for an episode of unresponsiveness and increased confusion this morning.  Family states that the patient has been taking Prednisone since January for COPD and IBS and attributes the change in mental status to a side effect of the steroid.  Patient was disoriented and confused this morning and could not respond to her son.  Patient also has been eating less and has needed to be carried upstairs and on/off the toilet an increasing amount of times since the winter.  Patient's confusion was in relation to daily routines rather than forgetting names and places.  Patient has increased tiredness and weakness/lack of energy - not being able to keep her head up, per son.  The symptoms are worse when the patient is tired, in the morning and at night.      #Metabolic encephalopathy most likely secondary to  citrobacter amalonticu complex bacteremia.    CT head negative, CT abd/pelvis: negative for any abscess but possible pneumonia.   Echo negative for vegetation, Repeat blood culture 5/19 negative.   ID recommended to obtain AFB stain to rule out NTM infection ( no isolation needed). pt not able to produce sputum will try with nebulizer to induce sputum.   Treated with  Ceftriaxone 2g IV daily 5/19- Upon discharge, can be switched to PO levofloxacin 500mg q 24 from 5/19/24 for 10 days.    Elevated LDH. Follow up with fungitell. Started on atovaquone 1500 mg daily.   Can be continued until prednisone is tapered below 20mg once a day   Off loading to prevent pressure sores and preventive measures to avoid aspiration.     #Transaminitis likely Cholestasis from Antibiotics   C/W Mild IVF F/up GGT, acute Hepatic panel and Liver US   -Trending down     #Severe malnutrition with sacrococcygeal pressure wound.    Nutrition and Wound care Nurse  recs appreciated  Continue with Wound care      #Acute Urinary Retention: c/w Potter catheter and Flomax  -TOV in NH when more ambulatory or within 3 days or starting Flomax  ( 5/25)     #COPD on Supplemental home oxygen   -Continue with Nebulizers and inhalers   - prednisone 20 mg daily for 5 days.   - o2   2L- 3L liters - home dose     #Anemia with macrocytosis : hgb stable   Likely from chronic Disease   - folate and b12 levels are normal  -Hematology F/up O/P     IBS- constipation  KUB with Large stool burden in colon and rectum   Continue aggressive bowel regimen and bentyl     #Anxiety +  MDD:  c/w home Remeron / Buspar   Hypertension: Continue with ACEi and DASH    DVT PPX: lovenox  GI PPX: PPI   DNR/ DNI      78 year old  female brought in by ambulance today for an episode of unresponsiveness and increased confusion this morning.  Family states that the patient has been taking Prednisone since January for COPD and IBS and attributes the change in mental status to a side effect of the steroid.  Patient was disoriented and confused this morning and could not respond to her son.  Patient also has been eating less and has needed to be carried upstairs and on/off the toilet an increasing amount of times since the winter.  Patient's confusion was in relation to daily routines rather than forgetting names and places.  Patient has increased tiredness and weakness/lack of energy - not being able to keep her head up, per son.  The symptoms are worse when the patient is tired, in the morning and at night.      #Metabolic encephalopathy most likely secondary to  citrobacter amalonticu complex bacteremia.    CT head negative, CT abd/pelvis: negative for any abscess but possible pneumonia.   Echo negative for vegetation, Repeat blood culture 5/19 negative.   ID recommended to obtain AFB stain to rule out NTM infection ( no isolation needed). pt not able to produce sputum will try with nebulizer to induce sputum.   Treated with  Ceftriaxone 2g IV daily 5/19- Upon discharge, can be switched to PO levofloxacin 500mg q 24 from 5/19/24 for 10 days.    Elevated LDH. Follow up with fungitell. Started on atovaquone 1500 mg daily.   Can be continued until prednisone is tapered below 20mg once a day   Off loading to prevent pressure sores and preventive measures to avoid aspiration.     #Transaminitis likely Cholestasis from Antibiotics   C/W Mild IVF F/up GGT, acute Hepatic panel and Liver US   -Trending down     #Severe malnutrition with sacrococcygeal pressure wound.    Nutrition and Wound care Nurse  recs appreciated  Continue with Wound care        #Acute Urinary Retention: c/w Potter catheter and Flomax  -TOV in NH when more ambulatory or within 3 days or starting Flomax  ( 5/25)     #COPD on Supplemental home oxygen   -Continue with Nebulizers and inhalers   - prednisone 20 mg daily for 5 days.   - o2   2L- 3L liters - home dose     #Anemia with macrocytosis : hgb stable   Likely from chronic Disease   - folate and b12 levels are normal  -Hematology F/up O/P     IBS- constipation  KUB with Large stool burden in colon and rectum   Continue aggressive bowel regimen and bentyl     #Anxiety +  MDD:  c/w home Remeron / Buspar   Hypertension: Continue with ACEi and DASH    DVT PPX: lovenox  GI PPX: PPI   DNR/ DNI

## 2024-05-23 NOTE — PROGRESS NOTE ADULT - REASON FOR ADMISSION
increased confusion, altered mental status

## 2024-05-23 NOTE — DISCHARGE NOTE PROVIDER - PROVIDER TOKENS
PROVIDER:[TOKEN:[[id number]],FOLLOWUP:[1 week]] PROVIDER:[TOKEN:[[id number]],FOLLOWUP:[1 week]],PROVIDER:[TOKEN:[[id number]]]

## 2024-05-23 NOTE — DISCHARGE NOTE PROVIDER - NSDCCPCAREPLAN_GEN_ALL_CORE_FT
PRINCIPAL DISCHARGE DIAGNOSIS  Diagnosis: AMS (altered mental status)  Assessment and Plan of Treatment: likely secondary to bacteremia  treated with IV antibiotics while inpatient, please continue oral antibiotics as directed  follow up with PCP in 1 week for reassessment      SECONDARY DISCHARGE DIAGNOSES  Diagnosis: Generalized weakness  Assessment and Plan of Treatment: likely secondary to infection  please continue medications as directed    Diagnosis: Urinary retention  Assessment and Plan of Treatment: please continue flomax and do a trial of void on 5/25    Diagnosis: Transaminitis  Assessment and Plan of Treatment: likely Cholestasis from Antibiotics   please follow up with PCP in 1 week for reassessement    Diagnosis: Malnutrition  Assessment and Plan of Treatment: continue nutritional supplementation as directed     PRINCIPAL DISCHARGE DIAGNOSIS  Diagnosis: AMS (altered mental status)  Assessment and Plan of Treatment: likely secondary to bacteremia from citrobacter amalonticu complex   treated with IV antibiotics while inpatient, please continue oral antibiotics as directed  follow up with PCP in 1 week for reassessment      SECONDARY DISCHARGE DIAGNOSES  Diagnosis: Generalized weakness  Assessment and Plan of Treatment: likely secondary to infection  please continue medications as directed    Diagnosis: Urinary retention  Assessment and Plan of Treatment: please continue flomax and do a trial of void on 5/25    Diagnosis: Transaminitis  Assessment and Plan of Treatment: likely Cholestasis from Antibiotics , ceftriaxone has been switched to levofloxacin   please follow up with PCP in 1 week for reassessement    Diagnosis: Malnutrition  Assessment and Plan of Treatment: continue nutritional supplementation as directed    Diagnosis: COPD, frequent exacerbations  Assessment and Plan of Treatment: Continue your Steriod Taper , you wre started on Atovaquone for PJP Prophylaxis given that you are on Steriods

## 2024-05-23 NOTE — DISCHARGE NOTE PROVIDER - NSDCFUADDAPPT_GEN_ALL_CORE_FT
FOLLOW-UP WITH INFECTIOUS DISEASE DOCTOR IN 1-2 WEEKS   FOLLOW-UP WITH PCP IN 1-2 WEEK  REPEAT LIVER FUNCTION TEST IN 3-5 DAYS

## 2024-05-23 NOTE — PROGRESS NOTE ADULT - ASSESSMENT
This is a 78 year old  female with PMH of COPD on home O2 and IBS is brought in due to confusion.     IMPRESSION  #Citrobacter bacteremia and Hungatella  Life threatening. Source perhaps GI translocation.    #Metabolic encephalopathy   #chronic hypoxic resp failure, COPD on home O2.   #Lower chest with debris/secretions in bibasilar distal airways. Complete collapse of the left lower lobe and small left pleural effusion noted on CT  #Chronically on steroids. History of bronchiectasis.   #History of TIA s/p TPA 2022 (MRI was negative)  #History of IBS, Constipation  #Functionally not very active.   #CKD 3  #Sacral ulcer Stage 2 present on admission  #Obesity BMI (kg/m2): 18.3    RECOMMENDATIONS  -UA not impressive but was treated with oral antibiotics recently and Urine culture was negative (4/2024) Perhaps Macrobid or Bactrim  -Monitor LFTs. Wound care as per the team.   -Reasonable to switch to levofloxacin 500mg q 48+ flagyll 500mg BID from 5/19/24 for 10 days.    -Elevated LDH. Fungitell mildly elevated. Will treat for possible PJP pneumonia. Keep on atovaquone 750 mg BID for 21 days (S/D 5/23/24). After 21 days, can be continued as 1500mg daily PPX until prednisone is tapered below 20mg once a day.   -Off loading to prevent pressure sores and preventive measures to avoid aspiration.   -She will benefit from PCV 20 and RSV vaccines in 1-2 months.   -Can be provided follow up with me outpatient at [address] [place] [address], [facility], [place], [place], [address]. Patient should be advised to call at [phone] to make an appointment. Recall ID as needed.     If any questions, please send a message or call on [facility]  Please continue to update ID with any pertinent new laboratory or radiographic findings.    Kumar Ng M.D  Infectious Diseases Attending/   [employer] at [facility]

## 2024-05-23 NOTE — PROGRESS NOTE ADULT - PROVIDER SPECIALTY LIST ADULT
Hospitalist
Infectious Disease
Hospitalist

## 2024-05-23 NOTE — DISCHARGE NOTE PROVIDER - DETAILS OF MALNUTRITION DIAGNOSIS/DIAGNOSES
This patient has been assessed with a concern for Malnutrition and was treated during this hospitalization for the following Nutrition diagnosis/diagnoses:     -  05/16/2024: Severe protein-calorie malnutrition   -  05/16/2024: Underweight (BMI < 19)

## 2024-05-25 LAB
CULTURE RESULTS: SIGNIFICANT CHANGE UP
CULTURE RESULTS: SIGNIFICANT CHANGE UP
SPECIMEN SOURCE: SIGNIFICANT CHANGE UP
SPECIMEN SOURCE: SIGNIFICANT CHANGE UP

## 2024-05-31 DIAGNOSIS — B96.89 OTHER SPECIFIED BACTERIAL AGENTS AS THE CAUSE OF DISEASES CLASSIFIED ELSEWHERE: ICD-10-CM

## 2024-05-31 DIAGNOSIS — J96.11 CHRONIC RESPIRATORY FAILURE WITH HYPOXIA: ICD-10-CM

## 2024-05-31 DIAGNOSIS — G93.41 METABOLIC ENCEPHALOPATHY: ICD-10-CM

## 2024-05-31 DIAGNOSIS — F41.9 ANXIETY DISORDER, UNSPECIFIED: ICD-10-CM

## 2024-05-31 DIAGNOSIS — L89.152 PRESSURE ULCER OF SACRAL REGION, STAGE 2: ICD-10-CM

## 2024-05-31 DIAGNOSIS — N18.30 CHRONIC KIDNEY DISEASE, STAGE 3 UNSPECIFIED: ICD-10-CM

## 2024-05-31 DIAGNOSIS — J44.9 CHRONIC OBSTRUCTIVE PULMONARY DISEASE, UNSPECIFIED: ICD-10-CM

## 2024-05-31 DIAGNOSIS — R78.81 BACTEREMIA: ICD-10-CM

## 2024-05-31 DIAGNOSIS — K58.9 IRRITABLE BOWEL SYNDROME WITHOUT DIARRHEA: ICD-10-CM

## 2024-05-31 DIAGNOSIS — R62.7 ADULT FAILURE TO THRIVE: ICD-10-CM

## 2024-05-31 DIAGNOSIS — F32.9 MAJOR DEPRESSIVE DISORDER, SINGLE EPISODE, UNSPECIFIED: ICD-10-CM

## 2024-05-31 DIAGNOSIS — E43 UNSPECIFIED SEVERE PROTEIN-CALORIE MALNUTRITION: ICD-10-CM

## 2024-06-12 PROBLEM — K52.9 NONINFECTIVE GASTROENTERITIS AND COLITIS, UNSPECIFIED: Chronic | Status: ACTIVE | Noted: 2024-05-16

## 2024-06-12 PROBLEM — F41.9 ANXIETY DISORDER, UNSPECIFIED: Chronic | Status: ACTIVE | Noted: 2024-05-16

## 2024-06-12 PROBLEM — H40.9 UNSPECIFIED GLAUCOMA: Chronic | Status: ACTIVE | Noted: 2024-05-16

## 2024-06-18 PROBLEM — I10 BENIGN ESSENTIAL HTN: Status: ACTIVE | Noted: 2022-06-27

## 2024-06-18 PROBLEM — R33.9 URINARY RETENTION: Status: ACTIVE | Noted: 2024-06-18

## 2024-06-18 PROBLEM — I63.9 CVA (CEREBRAL VASCULAR ACCIDENT): Status: ACTIVE | Noted: 2022-07-08

## 2024-06-18 PROBLEM — F32.A DEPRESSION, UNSPECIFIED DEPRESSION TYPE: Status: ACTIVE | Noted: 2022-06-27

## 2024-06-18 PROBLEM — J44.9 COPD (CHRONIC OBSTRUCTIVE PULMONARY DISEASE): Status: ACTIVE | Noted: 2021-04-04

## 2024-06-18 PROBLEM — J47.9 BRONCHIECTASIS: Status: ACTIVE | Noted: 2021-04-04

## 2024-06-19 ENCOUNTER — APPOINTMENT (OUTPATIENT)
Dept: CARDIOLOGY | Facility: CLINIC | Age: 79
End: 2024-06-19

## 2024-06-19 ENCOUNTER — EMERGENCY (EMERGENCY)
Facility: HOSPITAL | Age: 79
LOS: 0 days | Discharge: HOME | End: 2024-06-19
Attending: EMERGENCY MEDICINE
Payer: MEDICARE

## 2024-06-19 VITALS
HEART RATE: 62 BPM | TEMPERATURE: 97 F | RESPIRATION RATE: 20 BRPM | DIASTOLIC BLOOD PRESSURE: 75 MMHG | SYSTOLIC BLOOD PRESSURE: 134 MMHG | OXYGEN SATURATION: 95 %

## 2024-06-19 DIAGNOSIS — Z95.818 PRESENCE OF OTHER CARDIAC IMPLANTS AND GRAFTS: Chronic | ICD-10-CM

## 2024-06-19 DIAGNOSIS — N39.0 URINARY TRACT INFECTION, SITE NOT SPECIFIED: ICD-10-CM

## 2024-06-19 DIAGNOSIS — R10.9 UNSPECIFIED ABDOMINAL PAIN: ICD-10-CM

## 2024-06-19 DIAGNOSIS — R41.89 OTHER SYMPTOMS AND SIGNS INVOLVING COGNITIVE FUNCTIONS AND AWARENESS: ICD-10-CM

## 2024-06-19 DIAGNOSIS — Z79.82 LONG TERM (CURRENT) USE OF ASPIRIN: ICD-10-CM

## 2024-06-19 DIAGNOSIS — Z88.0 ALLERGY STATUS TO PENICILLIN: ICD-10-CM

## 2024-06-19 LAB
ALBUMIN SERPL ELPH-MCNC: 2.6 G/DL — LOW (ref 3.5–5.2)
ALP SERPL-CCNC: 89 U/L — SIGNIFICANT CHANGE UP (ref 30–115)
ALT FLD-CCNC: 21 U/L — SIGNIFICANT CHANGE UP (ref 0–41)
ANION GAP SERPL CALC-SCNC: 5 MMOL/L — LOW (ref 7–14)
APPEARANCE UR: ABNORMAL
AST SERPL-CCNC: 17 U/L — SIGNIFICANT CHANGE UP (ref 0–41)
BACTERIA # UR AUTO: ABNORMAL /HPF
BASOPHILS # BLD AUTO: 0.01 K/UL — SIGNIFICANT CHANGE UP (ref 0–0.2)
BASOPHILS NFR BLD AUTO: 0.1 % — SIGNIFICANT CHANGE UP (ref 0–1)
BILIRUB SERPL-MCNC: <0.2 MG/DL — SIGNIFICANT CHANGE UP (ref 0.2–1.2)
BILIRUB UR-MCNC: NEGATIVE — SIGNIFICANT CHANGE UP
BUN SERPL-MCNC: 36 MG/DL — HIGH (ref 10–20)
CALCIUM SERPL-MCNC: 8.3 MG/DL — LOW (ref 8.4–10.5)
CHLORIDE SERPL-SCNC: 98 MMOL/L — SIGNIFICANT CHANGE UP (ref 98–110)
CO2 SERPL-SCNC: 32 MMOL/L — SIGNIFICANT CHANGE UP (ref 17–32)
COLOR SPEC: YELLOW — SIGNIFICANT CHANGE UP
CREAT SERPL-MCNC: 0.6 MG/DL — LOW (ref 0.7–1.5)
DIFF PNL FLD: ABNORMAL
EGFR: 91 ML/MIN/1.73M2 — SIGNIFICANT CHANGE UP
EOSINOPHIL # BLD AUTO: 0 K/UL — SIGNIFICANT CHANGE UP (ref 0–0.7)
EOSINOPHIL NFR BLD AUTO: 0 % — SIGNIFICANT CHANGE UP (ref 0–8)
EPI CELLS # UR: PRESENT
GLUCOSE SERPL-MCNC: 100 MG/DL — HIGH (ref 70–99)
GLUCOSE UR QL: NEGATIVE MG/DL — SIGNIFICANT CHANGE UP
HCT VFR BLD CALC: 28.2 % — LOW (ref 37–47)
HGB BLD-MCNC: 8.5 G/DL — LOW (ref 12–16)
IMM GRANULOCYTES NFR BLD AUTO: 0.6 % — HIGH (ref 0.1–0.3)
KETONES UR-MCNC: NEGATIVE MG/DL — SIGNIFICANT CHANGE UP
LEUKOCYTE ESTERASE UR-ACNC: ABNORMAL
LYMPHOCYTES # BLD AUTO: 0.47 K/UL — LOW (ref 1.2–3.4)
LYMPHOCYTES # BLD AUTO: 4.6 % — LOW (ref 20.5–51.1)
MCHC RBC-ENTMCNC: 30.1 G/DL — LOW (ref 32–37)
MCHC RBC-ENTMCNC: 30.5 PG — SIGNIFICANT CHANGE UP (ref 27–31)
MCV RBC AUTO: 101.1 FL — HIGH (ref 81–99)
MONOCYTES # BLD AUTO: 0.42 K/UL — SIGNIFICANT CHANGE UP (ref 0.1–0.6)
MONOCYTES NFR BLD AUTO: 4.1 % — SIGNIFICANT CHANGE UP (ref 1.7–9.3)
NEUTROPHILS # BLD AUTO: 9.36 K/UL — HIGH (ref 1.4–6.5)
NEUTROPHILS NFR BLD AUTO: 90.6 % — HIGH (ref 42.2–75.2)
NITRITE UR-MCNC: NEGATIVE — SIGNIFICANT CHANGE UP
NRBC # BLD: 0 /100 WBCS — SIGNIFICANT CHANGE UP (ref 0–0)
PH UR: 6 — SIGNIFICANT CHANGE UP (ref 5–8)
PLATELET # BLD AUTO: 348 K/UL — SIGNIFICANT CHANGE UP (ref 130–400)
PMV BLD: 9.2 FL — SIGNIFICANT CHANGE UP (ref 7.4–10.4)
POTASSIUM SERPL-MCNC: 5.3 MMOL/L — HIGH (ref 3.5–5)
POTASSIUM SERPL-SCNC: 5.3 MMOL/L — HIGH (ref 3.5–5)
PROT SERPL-MCNC: 6 G/DL — SIGNIFICANT CHANGE UP (ref 6–8)
PROT UR-MCNC: SIGNIFICANT CHANGE UP MG/DL
RBC # BLD: 2.79 M/UL — LOW (ref 4.2–5.4)
RBC # FLD: 15.3 % — HIGH (ref 11.5–14.5)
RBC CASTS # UR COMP ASSIST: 10 /HPF — HIGH (ref 0–4)
SODIUM SERPL-SCNC: 135 MMOL/L — SIGNIFICANT CHANGE UP (ref 135–146)
SP GR SPEC: 1.02 — SIGNIFICANT CHANGE UP (ref 1–1.03)
SQUAMOUS # UR AUTO: 5 /HPF — SIGNIFICANT CHANGE UP (ref 0–5)
UROBILINOGEN FLD QL: 0.2 MG/DL — SIGNIFICANT CHANGE UP (ref 0.2–1)
WBC # BLD: 10.32 K/UL — SIGNIFICANT CHANGE UP (ref 4.8–10.8)
WBC # FLD AUTO: 10.32 K/UL — SIGNIFICANT CHANGE UP (ref 4.8–10.8)
WBC UR QL: ABNORMAL /HPF (ref 0–5)
YEAST-LIKE CELLS: PRESENT

## 2024-06-19 PROCEDURE — 85025 COMPLETE CBC W/AUTO DIFF WBC: CPT

## 2024-06-19 PROCEDURE — 81001 URINALYSIS AUTO W/SCOPE: CPT

## 2024-06-19 PROCEDURE — 99284 EMERGENCY DEPT VISIT MOD MDM: CPT

## 2024-06-19 PROCEDURE — 87186 SC STD MICRODIL/AGAR DIL: CPT

## 2024-06-19 PROCEDURE — 99283 EMERGENCY DEPT VISIT LOW MDM: CPT | Mod: 25

## 2024-06-19 PROCEDURE — 80053 COMPREHEN METABOLIC PANEL: CPT

## 2024-06-19 PROCEDURE — 87086 URINE CULTURE/COLONY COUNT: CPT

## 2024-06-19 PROCEDURE — 36000 PLACE NEEDLE IN VEIN: CPT

## 2024-06-19 PROCEDURE — 36415 COLL VENOUS BLD VENIPUNCTURE: CPT

## 2024-06-19 PROCEDURE — 87077 CULTURE AEROBIC IDENTIFY: CPT

## 2024-06-19 RX ORDER — CEFPODOXIME PROXETIL 100 MG
100 TABLET ORAL ONCE
Refills: 0 | Status: COMPLETED | OUTPATIENT
Start: 2024-06-19 | End: 2024-06-19

## 2024-06-19 RX ORDER — CEFPODOXIME PROXETIL 100 MG
1 TABLET ORAL
Qty: 14 | Refills: 0
Start: 2024-06-19 | End: 2024-06-25

## 2024-06-19 RX ADMIN — Medication 100 MILLIGRAM(S): at 21:55

## 2024-06-19 NOTE — ED PROVIDER NOTE - PHYSICAL EXAMINATION
--EXAM--  VITAL SIGNS: I have reviewed vs documented at present.  CONSTITUTIONAL: Well-developed; well-nourished; in no acute distress.   SKIN: Warm and dry, no acute rash.   HEAD: Normocephalic; atraumatic.  EYES: PERRL, EOM intact; conjunctiva and sclera clear. No nystagmus.  ENT: No nasal discharge; airway clear.  NECK: Supple; non tender.  CARD: S1, S2, Regular rate and rhythm.   RESP: No wheezes, rales or rhonchi.  ABD: Normal bowel sounds; soft; non-distended; non-tender.  bhatia catheter is in place and is draining urine

## 2024-06-19 NOTE — ED PROVIDER NOTE - CLINICAL SUMMARY MEDICAL DECISION MAKING FREE TEXT BOX
78 female here for urinary retention / UTI. Had screening exam, supportive care, medications and reevaluation,  symptoms improved, plan discussed with patient' family, ABX initiated, , will discharge with outpatient management and return and follow up instructions.

## 2024-06-19 NOTE — ED ADULT NURSE NOTE - NSFALLHARMRISKINTERV_ED_ALL_ED
Assistance OOB with selected safe patient handling equipment if applicable/Assistance with ambulation/Communicate risk of Fall with Harm to all staff, patient, and family/Monitor gait and stability/Provide visual cue: red socks, yellow wristband, yellow gown, etc/Reinforce activity limits and safety measures with patient and family/Bed in lowest position, wheels locked, appropriate side rails in place/Call bell, personal items and telephone in reach/Instruct patient to call for assistance before getting out of bed/chair/stretcher/Non-slip footwear applied when patient is off stretcher/[place] to call system/Physically safe environment - no spills, clutter or unnecessary equipment/Purposeful Proactive Rounding/Room/bathroom lighting operational, light cord in reach

## 2024-06-19 NOTE — ED PROVIDER NOTE - OBJECTIVE STATEMENT
79 yo female presents to ed for evaluation of abdominal pain. patient has had discomfort for about 4 days. patient son called doctor to house today and they found that she was in urinary retention. patient had bhatia placed at home . patient felt immediately better. patient son states he was told to come in and have her evaluated. patient son however feels she is improved and would like to take her home. patient has no complaints at this time.

## 2024-06-19 NOTE — ED PROVIDER NOTE - PATIENT PORTAL LINK FT
You can access the FollowMyHealth Patient Portal offered by [facility] by registering at the following website: [email or web address]. By joining [facility]’s FollowMyHealth portal, you will also be able to view your health information using other applications (apps) compatible with our system.

## 2024-06-19 NOTE — PHARMACOTHERAPY INTERVENTION NOTE - COMMENTS
as per PA Israel, pt gets rash, but also has taken Vantin before. PA aware of allergy to penicillin and approves use of cefpodoxime

## 2024-06-19 NOTE — ED PROVIDER NOTE - ATTENDING CONTRIBUTION TO CARE
I personally evaluated the patient. I reviewed the Resident’s or Physician Assistant’s note (as assigned above), and agree with the findings and plan except as documented in my note.     78 female here for evaluation of abdominal discomfort. Had catheterization done at home by home visit staff for urinary retention. Son brought patient in for UTI eval as per PMD. Patient is a limited historian due to cognitive decline. Son prefers to take patient home after workup suggested by PMD service.     PE: elderly female in no distress. CV: pulses intact. CHEST: normal work of breathing. ABD: nondistended. SKIN: normal. EXT: FROM. NEURO: cognitively impaired HEENT: mucosa dry    Impression: UTI    Plan: IV labs supportive care and reevaluation

## 2024-06-19 NOTE — ED ADULT TRIAGE NOTE - CHIEF COMPLAINT QUOTE
c/o abd pain. home visit nurse found that she was in urine retention and placed bhatia. 1300 ml out. Sent to ED for additional evaluation

## 2024-06-19 NOTE — ED PROVIDER NOTE - NS ED ATTENDING STATEMENT MOD
I have seen and examined this patient and fully participated in the care of this patient as the teaching attending.  The service was shared with the CEM.  I reviewed and verified the documentation.

## 2024-06-21 RX ORDER — NITROFURANTOIN MACROCRYSTAL 50 MG
1 CAPSULE ORAL
Qty: 14 | Refills: 0
Start: 2024-06-21 | End: 2024-06-27

## 2024-06-22 ENCOUNTER — APPOINTMENT (OUTPATIENT)
Dept: GERIATRICS | Facility: HOME HEALTH | Age: 79
End: 2024-06-22
Payer: MEDICARE

## 2024-06-22 VITALS
OXYGEN SATURATION: 97 % | RESPIRATION RATE: 18 BRPM | DIASTOLIC BLOOD PRESSURE: 62 MMHG | SYSTOLIC BLOOD PRESSURE: 130 MMHG | HEART RATE: 78 BPM | TEMPERATURE: 97.2 F

## 2024-06-22 DIAGNOSIS — J47.9 BRONCHIECTASIS, UNCOMPLICATED: ICD-10-CM

## 2024-06-22 DIAGNOSIS — F32.A DEPRESSION, UNSPECIFIED: ICD-10-CM

## 2024-06-22 DIAGNOSIS — I63.9 CEREBRAL INFARCTION, UNSPECIFIED: ICD-10-CM

## 2024-06-22 DIAGNOSIS — R33.9 RETENTION OF URINE, UNSPECIFIED: ICD-10-CM

## 2024-06-22 DIAGNOSIS — I10 ESSENTIAL (PRIMARY) HYPERTENSION: ICD-10-CM

## 2024-06-22 DIAGNOSIS — J44.9 CHRONIC OBSTRUCTIVE PULMONARY DISEASE, UNSPECIFIED: ICD-10-CM

## 2024-06-22 PROCEDURE — 99349 HOME/RES VST EST MOD MDM 40: CPT

## 2024-06-22 RX ORDER — TAMSULOSIN HYDROCHLORIDE 0.4 MG/1
0.4 CAPSULE ORAL
Refills: 0 | Status: ACTIVE | COMMUNITY

## 2024-06-22 RX ORDER — PSYLLIUM SEED (WITH DEXTROSE)
28 POWDER (GRAM) ORAL
Qty: 30 | Refills: 2 | Status: DISCONTINUED | COMMUNITY
Start: 2022-10-24 | End: 2024-06-22

## 2024-06-22 RX ORDER — BENZONATATE 100 MG/1
100 CAPSULE ORAL 3 TIMES DAILY
Qty: 270 | Refills: 3 | Status: DISCONTINUED | COMMUNITY
End: 2024-06-22

## 2024-06-22 RX ORDER — BENZONATATE 100 MG/1
100 CAPSULE ORAL 3 TIMES DAILY
Refills: 0 | Status: ACTIVE | COMMUNITY

## 2024-06-22 RX ORDER — LORATADINE 10 MG
17 TABLET,DISINTEGRATING ORAL DAILY
Qty: 3 | Refills: 11 | Status: DISCONTINUED | COMMUNITY
Start: 2022-10-24 | End: 2024-06-22

## 2024-06-22 NOTE — ASSESSMENT
[FreeTextEntry1] : #TME 2/2 Bacteremia 2/2 citrobacter amalonticu complex  - resolved, back to baseline - completed ABX  #Transaminitis - new in hospital - RUQ sono done, unable to visualize GB - resolved on labs 6/19/24  #Sacral Wound 3x2, Stage 2 - at baseline - local wound care - pressure off loading - no s/s active infection  #UTI  #UR s/p Potter cath 6/19 - UCx w/Enterococcus >100k CFU, sensitivity pending - c/w Cefpodoxime 100mg BID x 7 days, last day 6/26 - Potter cath, outpatient Urology FU with Dr. Rojo.  7/8  #Hx of TIA s/p TPA 6/22 (cryptogenic) s/p ILR 11/22 - no CVA appreciated on MRI - c/w ASA, Statin stopped by Neuro. Lipids rechecked 3/23 and WNL. Monitor off statin - outpatient Neuro FU as scheduled - ?AFIB, had MCOT, s/p ILR 11/22. Outpatient EPS FU - ECHO 6/22 in note 6/22  #Hx of IBS-C - Followed with GI, needs C-scope but patient refuses. - unable to tolerate laxatives - ID suspects bacteremia 2/2 IBS, needs GI FU.  Advised importance - Having telehealth with Dr. Bronson next week  #COPD - on prednisone taper, currently alternating 20/10, will decrease to 10mg 6/24.  If patient goes over 20mg QD will need atovaquone 150mg MWF.  Taper handled by Dr. Duff - c/w LABA/ICS/LAMA - b2 PRN - o2 PRN - Outpatient Pulm FU as scheduled  #HTN - c/w Lisinopril - DASH Diet  #Vitamin Deficiency - c/w PO supplements  #CKD 3 - baseline Scr aprox 1.1 - avoid Nephrotoxics - periodic monitoring, refusing labs at this time  #Anxiety - c/w Mirtazapine - c/w buspirone 20mg qAM, 10mg at 1300, and 10mg at 1900. - outpatient Psych FU  #Generalized Weakness #Gait Instability - DME for ambulation - pain control - fall precautions - c/w PT/OT

## 2024-06-22 NOTE — HISTORY OF PRESENT ILLNESS
[FreeTextEntry1] : Patient seen and examined at home.  Patient is homebound 2/2 generalized weakness/debility.  Son Jacob present during visit.  Patient s/p recent hospitalization 5/16-5/23 2/2 AMS/Confusion and episode of unresponsiveness.  Patient diagnosed with Citrobacter amalonticu complex bacteremia.  Patient treated with IV ABX, changed to PO, discharged to [facility].  Patient started on atovaquone for PJP PX 2/2 high dose steroids.  Patient had FU with ID, atovaquone stopped 2/2 abdominal pain and Pred now 20mg.  ID advised that if Prednisone increased >20mg, she will need to restart 150mg MWF.  Patient also with Transaminitis, down trending by discharge, UR discharged with Potter and Flomax.  Patient discharged to [facility] and recently returned home.  Patient passed TOV at [facility].  Patient with abdominal pain, [facility] RN came and had US.  Potter cath placed and son reports 1200ml residual.  Went to ED, Rx'd Vantin and currently taking.  UR felt to be 2/2 Percocet use for abdominal pain.  UCx with Enterococcus, sensitivity pending.  Patient reported at baseline.  No CP/SOB.  patient had abdominal pain, now resolved.  Having intermittent episodes of constipation 2/2 IBS.  No urinary complaints.  Potter still in place.  Patient also with Sacral wound, VNS following.  Son reports improving.

## 2024-06-22 NOTE — PHYSICAL EXAM
[Alert] : alert [Sclera] : the sclera and conjunctiva were normal [Normal Outer Ear/Nose] : the ears and nose were normal in appearance [Normal Appearance] : the appearance of the neck was normal [Supple] : the neck was supple [No Respiratory Distress] : no respiratory distress [No Acc Muscle Use] : no accessory muscle use [Respiration, Rhythm And Depth] : normal respiratory rhythm and effort [Auscultation Breath Sounds / Voice Sounds] : lungs were clear to auscultation bilaterally [Normal S1, S2] : normal S1 and S2 [Heart Rate And Rhythm] : heart rate was normal and rhythm regular [Edema] : edema was not present [Abdomen Tenderness] : non-tender [Abdomen Soft] : soft [No Spinal Tenderness] : no spinal tenderness [No Clubbing, Cyanosis] : no clubbing or cyanosis of the fingernails [Involuntary Movements] : no involuntary movements were seen [] : no rash [No Focal Deficits] : no focal deficits [Oriented To Time, Place, And Person] : oriented to person, place, and time [de-identified] : dec airflow b/l [de-identified] : + Potter in place with leg bag [de-identified] : PU to sacrum not assessed.

## 2024-06-24 ENCOUNTER — NON-APPOINTMENT (OUTPATIENT)
Age: 79
End: 2024-06-24

## 2024-06-25 DIAGNOSIS — N39.0 URINARY TRACT INFECTION, SITE NOT SPECIFIED: ICD-10-CM

## 2024-06-25 DIAGNOSIS — A49.9 URINARY TRACT INFECTION, SITE NOT SPECIFIED: ICD-10-CM

## 2024-06-25 LAB
-  AMPICILLIN: SIGNIFICANT CHANGE UP
-  CIPROFLOXACIN: SIGNIFICANT CHANGE UP
-  DAPTOMYCIN: SIGNIFICANT CHANGE UP
-  LEVOFLOXACIN: SIGNIFICANT CHANGE UP
-  LINEZOLID: SIGNIFICANT CHANGE UP
-  NITROFURANTOIN: SIGNIFICANT CHANGE UP
-  TETRACYCLINE: SIGNIFICANT CHANGE UP
-  VANCOMYCIN: SIGNIFICANT CHANGE UP
CULTURE RESULTS: ABNORMAL
METHOD TYPE: SIGNIFICANT CHANGE UP
ORGANISM # SPEC MICROSCOPIC CNT: ABNORMAL
ORGANISM # SPEC MICROSCOPIC CNT: SIGNIFICANT CHANGE UP
SPECIMEN SOURCE: SIGNIFICANT CHANGE UP

## 2024-06-25 RX ORDER — NITROFURANTOIN MACROCRYSTALS 100 MG/1
100 CAPSULE ORAL
Qty: 10 | Refills: 0 | Status: ACTIVE | COMMUNITY
Start: 2024-06-25 | End: 1900-01-01

## 2024-06-25 NOTE — CHART NOTE - NSCHARTNOTEFT_GEN_A_CORE
[facility] MRN [id number] sent to private uro 6/20 lw APPT SCHEDULED Mon 07/08/2024 02:40 PM 6/25 LW

## 2024-07-08 ENCOUNTER — APPOINTMENT (OUTPATIENT)
Dept: UROLOGY | Facility: CLINIC | Age: 79
End: 2024-07-08
Payer: MEDICARE

## 2024-07-08 DIAGNOSIS — F41.9 ANXIETY DISORDER, UNSPECIFIED: ICD-10-CM

## 2024-07-08 DIAGNOSIS — Z78.9 OTHER SPECIFIED HEALTH STATUS: ICD-10-CM

## 2024-07-08 DIAGNOSIS — J45.909 UNSPECIFIED ASTHMA, UNCOMPLICATED: ICD-10-CM

## 2024-07-08 DIAGNOSIS — Z87.19 PERSONAL HISTORY OF OTHER DISEASES OF THE DIGESTIVE SYSTEM: ICD-10-CM

## 2024-07-08 DIAGNOSIS — R33.9 RETENTION OF URINE, UNSPECIFIED: ICD-10-CM

## 2024-07-08 DIAGNOSIS — N39.0 URINARY TRACT INFECTION, SITE NOT SPECIFIED: ICD-10-CM

## 2024-07-08 DIAGNOSIS — Z86.73 PERSONAL HISTORY OF TRANSIENT ISCHEMIC ATTACK (TIA), AND CEREBRAL INFARCTION W/OUT RESIDUAL DEFICITS: ICD-10-CM

## 2024-07-08 DIAGNOSIS — Z82.3 FAMILY HISTORY OF STROKE: ICD-10-CM

## 2024-07-08 DIAGNOSIS — I63.9 CEREBRAL INFARCTION, UNSPECIFIED: ICD-10-CM

## 2024-07-08 DIAGNOSIS — J43.9 EMPHYSEMA, UNSPECIFIED: ICD-10-CM

## 2024-07-08 DIAGNOSIS — A49.9 URINARY TRACT INFECTION, SITE NOT SPECIFIED: ICD-10-CM

## 2024-07-08 DIAGNOSIS — J44.89 OTHER SPECIFIED CHRONIC OBSTRUCTIVE PULMONARY DISEASE: ICD-10-CM

## 2024-07-08 PROCEDURE — 99204 OFFICE O/P NEW MOD 45 MIN: CPT

## 2024-07-08 RX ORDER — MONTELUKAST SODIUM 10 MG/1
10 TABLET, FILM COATED ORAL
Refills: 0 | Status: ACTIVE | COMMUNITY

## 2024-07-08 RX ORDER — BENZONATATE 100 MG/1
100 CAPSULE ORAL
Refills: 0 | Status: ACTIVE | COMMUNITY

## 2024-07-11 PROBLEM — J44.89 CHRONIC ASTHMATIC BRONCHITIS: Status: ACTIVE | Noted: 2021-04-04

## 2024-07-24 ENCOUNTER — APPOINTMENT (OUTPATIENT)
Dept: CARDIOLOGY | Facility: CLINIC | Age: 79
End: 2024-07-24

## 2024-07-28 NOTE — ED PROVIDER NOTE - CLINICAL SUMMARY MEDICAL DECISION MAKING FREE TEXT BOX
78-year-old female history of COPD asthma DNR/DNI brought in by family for unresponsiveness.  Patient reportedly was short of breath this morning  and her pulse ox at home was 89.  When the pulse ox did not improve EMS was called.  Upon arrival patient is unarousable with temperature 91.6 blood pressure 52/29 bradycardic into the 40s with respiratory rate of 8 and a pulse ox of 57 on nonrebreather.  I confirmed with son at bedside and  that patient is DNR/DNI.  Fingerstick was 210 patient was given atropine, NS bolus, and  Levophed but shortly after arriving to ED patient was comforted via lack of pulse and echo with no cardaic activity. Respiratory was at bedside to start bipap but pt had no chest rise, and was futile given pts clnical condition and being DNR/DNI.  ekg junctional rhtym at rate of 29. 78-year-old female history of COPD asthma DNR/DNI brought in by family for unresponsiveness.  Patient reportedly was short of breath this morning  and her pulse ox at home was 89.  When the pulse ox did not improve EMS was called.  Upon arrival patient is unarousable with temperature 91.6 blood pressure 52/29 bradycardic into the 40s with respiratory rate of 8 and a pulse ox of 57 on nonrebreather.  I confirmed with son at bedside and  that patient is DNR/DNI.  Fingerstick was 210 patient was given atropine, NS bolus, and  Levophed but shortly after arriving to ED patient was comfirmed with lack of pulse and echo with no cardiac activity. Respiratory was at bedside to start bipap but pt had no chest rise, and was futile given pts clnical condition and being DNR/DNI.  ekg junctional rhtym at rate of 29.

## 2024-07-28 NOTE — ED ADULT NURSE NOTE - NSFALLUNIVINTERV_ED_ALL_ED
Bed/Stretcher in lowest position, wheels locked, appropriate side rails in place/Call bell, personal items and telephone in reach/Instruct patient to call for assistance before getting out of bed/chair/stretcher/Non-slip footwear applied when patient is off stretcher/[place] to call system/Physically safe environment - no spills, clutter or unnecessary equipment/Purposeful proactive rounding/Room/bathroom lighting operational, light cord in reach

## 2024-07-28 NOTE — ED PROVIDER NOTE - OBJECTIVE STATEMENT
patient BIBA , unresponsive, was awake and  normal this am according to family , later found unresponsive by family, H/o COPD, DNR/DNI,

## 2024-07-28 NOTE — ED ADULT TRIAGE NOTE - CHIEF COMPLAINT QUOTE
As per EMS, Patient was unarousable after breakfast nap. On arrival to ED patient hypotensive and hypoxic. Patient has DNR/DNI. Son at bedside

## 2024-08-08 ENCOUNTER — APPOINTMENT (OUTPATIENT)
Dept: PULMONOLOGY | Facility: CLINIC | Age: 79
End: 2024-08-08

## 2024-08-28 ENCOUNTER — APPOINTMENT (OUTPATIENT)
Dept: CARDIOLOGY | Facility: CLINIC | Age: 79
End: 2024-08-28

## 2024-12-20 NOTE — REVIEW OF SYSTEMS
Tranexamic Acid Counseling:  Patient advised of the small risk of bleeding problems with tranexamic acid. They were also instructed to call if they developed any nausea, vomiting or diarrhea. All of the patient's questions and concerns were addressed. Eucrisa Pregnancy And Lactation Text: This medication has not been assigned a Pregnancy Risk Category but animal studies failed to show danger with the topical medication. It is unknown if the medication is excreted in breast milk. Sarecycline Counseling: Patient advised regarding possible photosensitivity and discoloration of the teeth, skin, lips, tongue and gums.  Patient instructed to avoid sunlight, if possible.  When exposed to sunlight, patients should wear protective clothing, sunglasses, and sunscreen.  The patient was instructed to call the office immediately if the following severe adverse effects occur:  hearing changes, easy bruising/bleeding, severe headache, or vision changes.  The patient verbalized understanding of the proper use and possible adverse effects of sarecycline.  All of the patient's questions and concerns were addressed. Rituxan Pregnancy And Lactation Text: This medication is Pregnancy Category C and it isn't know if it is safe during pregnancy. It is unknown if this medication is excreted in breast milk but similar antibodies are known to be excreted. Rhofade Counseling: Rhofade is a topical medication which can decrease superficial blood flow where applied. Side effects are uncommon and include stinging, redness and allergic reactions. Aklief counseling:  Patient advised to apply a pea-sized amount only at bedtime and wait 30 minutes after washing their face before applying.  If too drying, patient may add a non-comedogenic moisturizer.  The most commonly reported side effects including irritation, redness, scaling, dryness, stinging, burning, itching, and increased risk of sunburn.  The patient verbalized understanding of the proper use and possible adverse effects of retinoids.  All of the patient's questions and concerns were addressed. Itraconazole Pregnancy And Lactation Text: This medication is Pregnancy Category C and it isn't know if it is safe during pregnancy. It is also excreted in breast milk. Ebglyss Pregnancy And Lactation Text: This medication likely crosses the placenta but the risk for the fetus is uncertain. It is unknown if this medication is excreted in breast milk. Niacinamide Counseling: I recommended taking niacin or niacinamide, also know as vitamin B3, twice daily. Recent evidence suggests that taking vitamin B3 (500 mg twice daily) can reduce the risk of actinic keratoses and non-melanoma skin cancers. Side effects of vitamin B3 include flushing and headache. Prednisone Counseling:  I discussed with the patient the risks of prolonged use of prednisone including but not limited to weight gain, insomnia, osteoporosis, mood changes, diabetes, susceptibility to infection, glaucoma and high blood pressure.  In cases where prednisone use is prolonged, patients should be monitored with blood pressure checks, serum glucose levels and an eye exam.  Additionally, the patient may need to be placed on GI prophylaxis, PCP prophylaxis, and calcium and vitamin D supplementation and/or a bisphosphonate.  The patient verbalized understanding of the proper use and the possible adverse effects of prednisone.  All of the patient's questions and concerns were addressed. Topical Ketoconazole Counseling: Patient counseled that this medication may cause skin irritation or allergic reactions.  In the event of skin irritation, the patient was advised to reduce the amount of the drug applied or use it less frequently.   The patient verbalized understanding of the proper use and possible adverse effects of ketoconazole.  All of the patient's questions and concerns were addressed. Hydroxyzine Counseling: Patient advised that the medication is sedating and not to drive a car after taking this medication.  Patient informed of potential adverse effects including but not limited to dry mouth, urinary retention, and blurry vision.  The patient verbalized understanding of the proper use and possible adverse effects of hydroxyzine.  All of the patient's questions and concerns were addressed. Rinvoq Pregnancy And Lactation Text: Based on animal studies, Rinvoq may cause embryo-fetal harm when administered to pregnant women.  The medication should not be used in pregnancy.  Breastfeeding is not recommended during treatment and for 6 days after the last dose. Hydroquinone Counseling:  Patient advised that medication may result in skin irritation, lightening (hypopigmentation), dryness, and burning.  In the event of skin irritation, the patient was advised to reduce the amount of the drug applied or use it less frequently.  Rarely, spots that are treated with hydroquinone can become darker (pseudoochronosis).  Should this occur, patient instructed to stop medication and call the office. The patient verbalized understanding of the proper use and possible adverse effects of hydroquinone.  All of the patient's questions and concerns were addressed. Siliq Counseling:  I discussed with the patient the risks of Siliq including but not limited to new or worsening depression, suicidal thoughts and behavior, immunosuppression, malignancy, posterior leukoencephalopathy syndrome, and serious infections.  The patient understands that monitoring is required including a PPD at baseline and must alert us or the primary physician if symptoms of infection or other concerning signs are noted. There is also a special program designed to monitor depression which is required with Siliq. Rhofade Pregnancy And Lactation Text: This medication has not been assigned a Pregnancy Risk Category. It is unknown if the medication is excreted in breast milk. Detail Level: Simple Tranexamic Acid Pregnancy And Lactation Text: It is unknown if this medication is safe during pregnancy or breast feeding. Enbrel Counseling:  I discussed with the patient the risks of etanercept including but not limited to myelosuppression, immunosuppression, autoimmune hepatitis, demyelinating diseases, lymphoma, and infections.  The patient understands that monitoring is required including a PPD at baseline and must alert us or the primary physician if symptoms of infection or other concerning signs are noted. Aklief Pregnancy And Lactation Text: It is unknown if this medication is safe to use during pregnancy.  It is unknown if this medication is excreted in breast milk.  Breastfeeding women should use the topical cream on the smallest area of the skin for the shortest time needed while breastfeeding.  Do not apply to nipple and areola. Sarecycline Pregnancy And Lactation Text: This medication is Pregnancy Category D and not consider safe during pregnancy. It is also excreted in breast milk. Prednisone Pregnancy And Lactation Text: This medication is Pregnancy Category C and it isn't know if it is safe during pregnancy. This medication is excreted in breast milk. Bactrim Pregnancy And Lactation Text: This medication is Pregnancy Category D and is known to cause fetal risk.  It is also excreted in breast milk. Ketoconazole Counseling:   Patient counseled regarding improving absorption with orange juice.  Adverse effects include but are not limited to breast enlargement, headache, diarrhea, nausea, upset stomach, liver function test abnormalities, taste disturbance, and stomach pain.  There is a rare possibility of liver failure that can occur when taking ketoconazole. The patient understands that monitoring of LFTs may be required, especially at baseline. The patient verbalized understanding of the proper use and possible adverse effects of ketoconazole.  All of the patient's questions and concerns were addressed. Cantharidin Pregnancy And Lactation Text: This medication has not been proven safe during pregnancy. It is unknown if this medication is excreted in breast milk. Hydroxyzine Pregnancy And Lactation Text: This medication is not safe during pregnancy and should not be taken. It is also excreted in breast milk and breast feeding isn't recommended. Sotyktu Counseling:  I discussed the most common side effects of Sotyktu including: common cold, sore throat, sinus infections, cold sores, canker sores, folliculitis, and acne.  I also discussed more serious side effects of Sotyktu including but not limited to: serious allergic reactions; increased risk for infections such as TB; cancers such as lymphomas; rhabdomyolysis and elevated CPK; and elevated triglycerides and liver enzymes.  Topical Ketoconazole Pregnancy And Lactation Text: This medication is Pregnancy Category B and is considered safe during pregnancy. It is unknown if it is excreted in breast milk. Niacinamide Pregnancy And Lactation Text: These medications are considered safe during pregnancy. Enbrel Pregnancy And Lactation Text: This medication is Pregnancy Category B and is considered safe during pregnancy. It is unknown if this medication is excreted in breast milk. Tetracycline Counseling: Patient counseled regarding possible photosensitivity and increased risk for sunburn.  Patient instructed to avoid sunlight, if possible.  When exposed to sunlight, patients should wear protective clothing, sunglasses, and sunscreen.  The patient was instructed to call the office immediately if the following severe adverse effects occur:  hearing changes, easy bruising/bleeding, severe headache, or vision changes.  The patient verbalized understanding of the proper use and possible adverse effects of tetracycline.  All of the patient's questions and concerns were addressed. Patient understands to avoid pregnancy while on therapy due to potential birth defects. Siliq Pregnancy And Lactation Text: The risk during pregnancy and breastfeeding is uncertain with this medication. Include Pregnancy/Lactation Warning?: No Ketoconazole Pregnancy And Lactation Text: This medication is Pregnancy Category C and it isn't know if it is safe during pregnancy. It is also excreted in breast milk and breast feeding isn't recommended. [Negative] : Heme/Lymph Azelaic Acid Counseling: Patient counseled that medicine may cause skin irritation and to avoid applying near the eyes.  In the event of skin irritation, the patient was advised to reduce the amount of the drug applied or use it less frequently.   The patient verbalized understanding of the proper use and possible adverse effects of azelaic acid.  All of the patient's questions and concerns were addressed. [FreeTextEntry5] : per HPI Valtrex Counseling: I discussed with the patient the risks of valacyclovir including but not limited to kidney damage, nausea, vomiting and severe allergy.  The patient understands that if the infection seems to be worsening or is not improving, they are to call. Solaraze Counseling:  I discussed with the patient the risks of Solaraze including but not limited to erythema, scaling, itching, weeping, crusting, and pain. Sotyktu Pregnancy And Lactation Text: There is insufficient data to evaluate whether or not Sotyktu is safe to use during pregnancy.   It is not known if Sotyktu passes into breast milk and whether or not it is safe to use when breastfeeding.   Cephalexin Counseling: I counseled the patient regarding use of cephalexin as an antibiotic for prophylactic and/or therapeutic purposes. Cephalexin (commonly prescribed under brand name Keflex) is a cephalosporin antibiotic which is active against numerous classes of bacteria, including most skin bacteria. Side effects may include nausea, diarrhea, gastrointestinal upset, rash, hives, yeast infections, and in rare cases, hepatitis, kidney disease, seizures, fever, confusion, neurologic symptoms, and others. Patients with severe allergies to penicillin medications are cautioned that there is about a 10% incidence of cross-reactivity with cephalosporins. When possible, patients with penicillin allergies should use alternatives to cephalosporins for antibiotic therapy. Nsaids Counseling: NSAID Counseling: I discussed with the patient that NSAIDs should be taken with food. Prolonged use of NSAIDs can result in the development of stomach ulcers.  Patient advised to stop taking NSAIDs if abdominal pain occurs.  The patient verbalized understanding of the proper use and possible adverse effects of NSAIDs.  All of the patient's questions and concerns were addressed. Hyrimoz Counseling:  I discussed with the patient the risks of adalimumab including but not limited to myelosuppression, immunosuppression, autoimmune hepatitis, demyelinating diseases, lymphoma, and serious infections.  The patient understands that monitoring is required including a PPD at baseline and must alert us or the primary physician if symptoms of infection or other concerning signs are noted. Topical Metronidazole Counseling: Metronidazole is a topical antibiotic medication. You may experience burning, stinging, redness, or allergic reactions.  Please call our office if you develop any problems from using this medication. Imiquimod Counseling:  I discussed with the patient the risks of imiquimod including but not limited to erythema, scaling, itching, weeping, crusting, and pain.  Patient understands that the inflammatory response to imiquimod is variable from person to person and was educated regarded proper titration schedule.  If flu-like symptoms develop, patient knows to discontinue the medication and contact us. Simlandi Counseling:  I discussed with the patient the risks of adalimumab including but not limited to myelosuppression, immunosuppression, autoimmune hepatitis, demyelinating diseases, lymphoma, and serious infections.  The patient understands that monitoring is required including a PPD at baseline and must alert us or the primary physician if symptoms of infection or other concerning signs are noted. Humira Counseling:  I discussed with the patient the risks of adalimumab including but not limited to myelosuppression, immunosuppression, autoimmune hepatitis, demyelinating diseases, lymphoma, and serious infections.  The patient understands that monitoring is required including a PPD at baseline and must alert us or the primary physician if symptoms of infection or other concerning signs are noted. Arava Counseling:  Patient counseled regarding adverse effects of Arava including but not limited to nausea, vomiting, abnormalities in liver function tests. Patients may develop mouth sores, rash, diarrhea, and abnormalities in blood counts. The patient understands that monitoring is required including LFTs and blood counts.  There is a rare possibility of scarring of the liver and lung problems that can occur when taking methotrexate. Persistent nausea, loss of appetite, pale stools, dark urine, cough, and shortness of breath should be reported immediately. Patient advised to discontinue Arava treatment and consult with a physician prior to attempting conception. The patient will have to undergo a treatment to eliminate Arava from the body prior to conception. Terbinafine Counseling: Patient counseling regarding adverse effects of terbinafine including but not limited to headache, diarrhea, rash, upset stomach, liver function test abnormalities, itching, taste/smell disturbance, nausea, abdominal pain, and flatulence.  There is a rare possibility of liver failure that can occur when taking terbinafine.  The patient understands that a baseline LFT and kidney function test may be required. The patient verbalized understanding of the proper use and possible adverse effects of terbinafine.  All of the patient's questions and concerns were addressed. Azelaic Acid Pregnancy And Lactation Text: This medication is considered safe during pregnancy and breast feeding. Solaraze Pregnancy And Lactation Text: This medication is Pregnancy Category B and is considered safe. There is some data to suggest avoiding during the third trimester. It is unknown if this medication is excreted in breast milk. Nsaids Pregnancy And Lactation Text: These medications are considered safe up to 30 weeks gestation. It is excreted in breast milk. Cephalexin Pregnancy And Lactation Text: This medication is Pregnancy Category B and considered safe during pregnancy.  It is also excreted in breast milk but can be used safely for shorter doses. Xeljanz Counseling: I discussed with the patient the risks of Xeljanz therapy including increased risk of infection, liver issues, headache, diarrhea, or cold symptoms. Live vaccines should be avoided. They were instructed to call if they have any problems. Imiquimod Pregnancy And Lactation Text: This medication is Pregnancy Category C. It is unknown if this medication is excreted in breast milk. Topical Metronidazole Pregnancy And Lactation Text: This medication is Pregnancy Category B and considered safe during pregnancy.  It is also considered safe to use while breastfeeding. Arava Pregnancy And Lactation Text: This medication is Pregnancy Category X and is absolutely contraindicated during pregnancy. It is unknown if it is excreted in breast milk. Soolantra Counseling: I discussed with the patients the risks of topial Soolantra. This is a medicine which decreases the number of mites and inflammation in the skin. You experience burning, stinging, eye irritation or allergic reactions.  Please call our office if you develop any problems from using this medication. Terbinafine Pregnancy And Lactation Text: This medication is Pregnancy Category B and is considered safe during pregnancy. It is also excreted in breast milk and breast feeding isn't recommended. Benzoyl Peroxide Counseling: Patient counseled that medicine may cause skin irritation and bleach clothing.  In the event of skin irritation, the patient was advised to reduce the amount of the drug applied or use it less frequently.   The patient verbalized understanding of the proper use and possible adverse effects of benzoyl peroxide.  All of the patient's questions and concerns were addressed. Cyclophosphamide Pregnancy And Lactation Text: This medication is Pregnancy Category D and it isn't considered safe during pregnancy. This medication is excreted in breast milk. Vtama Pregnancy And Lactation Text: It is unknown if this medication can cause problems during pregnancy and breastfeeding. Fluconazole Counseling:  Patient counseled regarding adverse effects of fluconazole including but not limited to headache, diarrhea, nausea, upset stomach, liver function test abnormalities, taste disturbance, and stomach pain.  There is a rare possibility of liver failure that can occur when taking fluconazole.  The patient understands that monitoring of LFTs and kidney function test may be required, especially at baseline. The patient verbalized understanding of the proper use and possible adverse effects of fluconazole.  All of the patient's questions and concerns were addressed. Propranolol Pregnancy And Lactation Text: This medication is Pregnancy Category C and it isn't known if it is safe during pregnancy. It is excreted in breast milk. Wegovy Counseling: I reviewed the possible side effects including: thyroid tumors, kidney disease, gallbladder disease, abdominal pain, constipation, diarrhea, nausea, vomiting and pancreatitis. Do not take this medication if you have a history or family history of multiple endocrine neoplasia syndrome type 2. Side effects reviewed, pt to contact office should one occur. Litfulo Counseling: I discussed with the patient the risks of Litfulo therapy including but not limited to upper respiratory tract infections, shingles, cold sores, and nausea. Live vaccines should be avoided.  This medication has been linked to serious infections; higher rate of mortality; malignancy and lymphoproliferative disorders; major adverse cardiovascular events; thrombosis; gastrointestinal perforations; neutropenia; lymphopenia; anemia; liver enzyme elevations; and lipid elevations. Glycopyrrolate Pregnancy And Lactation Text: This medication is Pregnancy Category B and is considered safe during pregnancy. It is unknown if it is excreted breast milk. Ivermectin Counseling:  Patient instructed to take medication on an empty stomach with a full glass of water.  Patient informed of potential adverse effects including but not limited to nausea, diarrhea, dizziness, itching, and swelling of the extremities or lymph nodes.  The patient verbalized understanding of the proper use and possible adverse effects of ivermectin.  All of the patient's questions and concerns were addressed. Odomzo Counseling- I discussed with the patient the risks of Odomzo including but not limited to nausea, vomiting, diarrhea, constipation, weight loss, changes in the sense of taste, decreased appetite, muscle spasms, and hair loss.  The patient verbalized understanding of the proper use and possible adverse effects of Odomzo.  All of the patient's questions and concerns were addressed. Zoryve Counseling:  I discussed with the patient that Zoryve is not for use in the eyes, mouth or vagina. The most commonly reported side effects include diarrhea, headache, insomnia, application site pain, upper respiratory tract infections, and urinary tract infections.  All of the patient's questions and concerns were addressed. SSKI Counseling:  I discussed with the patient the risks of SSKI including but not limited to thyroid abnormalities, metallic taste, GI upset, fever, headache, acne, arthralgias, paraesthesias, lymphadenopathy, easy bleeding, arrhythmias, and allergic reaction. Valtrex Pregnancy And Lactation Text: this medication is Pregnancy Category B and is considered safe during pregnancy. This medication is not directly found in breast milk but it's metabolite acyclovir is present. Quinolones Counseling:  I discussed with the patient the risks of fluoroquinolones including but not limited to GI upset, allergic reaction, drug rash, diarrhea, dizziness, photosensitivity, yeast infections, liver function test abnormalities, tendonitis/tendon rupture. High Dose Vitamin A Counseling: Side effects reviewed, pt to contact office should one occur. Birth Control Pills Pregnancy And Lactation Text: This medication should be avoided if pregnant and for the first 30 days post-partum. Protopic Counseling: Patient may experience a mild burning sensation during topical application. Protopic is not approved in children less than 2 years of age. There have been case reports of hematologic and skin malignancies in patients using topical calcineurin inhibitors although causality is questionable. Wegovy Pregnancy And Lactation Text: The fetal risk of this medication is unknown and taking while pregnant is not recommended. It is unknown if this medication is present in breast milk. Hydroxychloroquine Counseling:  I discussed with the patient that a baseline ophthalmologic exam is needed at the start of therapy and every year thereafter while on therapy. A CBC may also be warranted for monitoring.  The side effects of this medication were discussed with the patient, including but not limited to agranulocytosis, aplastic anemia, seizures, rashes, retinopathy, and liver toxicity. Patient instructed to call the office should any adverse effect occur.  The patient verbalized understanding of the proper use and possible adverse effects of Plaquenil.  All the patient's questions and concerns were addressed. Cyclosporine Counseling:  I discussed with the patient the risks of cyclosporine including but not limited to hypertension, gingival hyperplasia,myelosuppression, immunosuppression, liver damage, kidney damage, neurotoxicity, lymphoma, and serious infections. The patient understands that monitoring is required including baseline blood pressure, CBC, CMP, lipid panel and uric acid, and then 1-2 times monthly CMP and blood pressure. Cimetidine Counseling:  I discussed with the patient the risks of Cimetidine including but not limited to gynecomastia, headache, diarrhea, nausea, drowsiness, arrhythmias, pancreatitis, skin rashes, psychosis, bone marrow suppression and kidney toxicity. Tremfya Counseling: I discussed with the patient the risks of guselkumab including but not limited to immunosuppression, serious infections, and drug reactions.  The patient understands that monitoring is required including a PPD at baseline and must alert us or the primary physician if symptoms of infection or other concerning signs are noted. Litfulo Pregnancy And Lactation Text: Based on animal studies, Lifulo may cause embryo-fetal harm when administered to pregnant women.  The medication should not be used in pregnancy.  Breastfeeding is not recommended during treatment. Ivermectin Pregnancy And Lactation Text: This medication is Pregnancy Category C and it isn't known if it is safe during pregnancy. It is also excreted in breast milk. Elidel Counseling: Patient may experience a mild burning sensation during topical application. Elidel is not approved in children less than 2 years of age. There have been case reports of hematologic and skin malignancies in patients using topical calcineurin inhibitors although causality is questionable. Tazorac Counseling:  Patient advised that medication is irritating and drying.  Patient may need to apply sparingly and wash off after an hour before eventually leaving it on overnight.  The patient verbalized understanding of the proper use and possible adverse effects of tazorac.  All of the patient's questions and concerns were addressed. Sski Pregnancy And Lactation Text: This medication is Pregnancy Category D and isn't considered safe during pregnancy. It is excreted in breast milk. Dupixent Counseling: I discussed with the patient the risks of dupilumab including but not limited to eye infection and irritation, cold sores, injection site reactions, worsening of asthma, allergic reactions and increased risk of parasitic infection.  Live vaccines should be avoided while taking dupilumab. Dupilumab will also interact with certain medications such as warfarin and cyclosporine. The patient understands that monitoring is required and they must alert us or the primary physician if symptoms of infection or other concerning signs are noted. High Dose Vitamin A Pregnancy And Lactation Text: High dose vitamin A therapy is contraindicated during pregnancy and breast feeding. Spironolactone Counseling: Patient advised regarding risks of diarrhea, abdominal pain, hyperkalemia, birth defects (for female patients), liver toxicity and renal toxicity. The patient may need blood work to monitor liver and kidney function and potassium levels while on therapy. The patient verbalized understanding of the proper use and possible adverse effects of spironolactone.  All of the patient's questions and concerns were addressed. Zepbound Counseling: I reviewed the possible side effects including: thyroid tumors, kidney disease, gallbladder disease, abdominal pain, constipation, diarrhea, nausea, vomiting and pancreatitis. Do not take this medication if you have a history or family history of multiple endocrine neoplasia syndrome type 2. Side effects reviewed, pt to contact office should one occur. Nemluvio Counseling: I discussed with the patient the risks of nemolizumab including but not limited to headache, gastrointestinal complaints, nasopharyngitis, musculoskeletal complaints, injection site reactions, and allergic reactions. The patient understands that monitoring is required and they must alert us or the primary physician if any side effects are noted. Protopic Pregnancy And Lactation Text: This medication is Pregnancy Category C. It is unknown if this medication is excreted in breast milk when applied topically. Griseofulvin Counseling:  I discussed with the patient the risks of griseofulvin including but not limited to photosensitivity, cytopenia, liver damage, nausea/vomiting and severe allergy.  The patient understands that this medication is best absorbed when taken with a fatty meal (e.g., ice cream or french fries). Olumiant Counseling: I discussed with the patient the risks of Olumiant therapy including but not limited to upper respiratory tract infections, shingles, cold sores, and nausea. Live vaccines should be avoided.  This medication has been linked to serious infections; higher rate of mortality; malignancy and lymphoproliferative disorders; major adverse cardiovascular events; thrombosis; gastrointestinal perforations; neutropenia; lymphopenia; anemia; liver enzyme elevations; and lipid elevations. Xelclairez Pregnancy And Lactation Text: This medication is Pregnancy Category D and is not considered safe during pregnancy.  The risk during breast feeding is also uncertain. Hydroxychloroquine Pregnancy And Lactation Text: This medication has been shown to cause fetal harm but it isn't assigned a Pregnancy Risk Category. There are small amounts excreted in breast milk. Tazorac Pregnancy And Lactation Text: This medication is not safe during pregnancy. It is unknown if this medication is excreted in breast milk. Dupixent Pregnancy And Lactation Text: This medication likely crosses the placenta but the risk for the fetus is uncertain. This medication is excreted in breast milk. Spironolactone Pregnancy And Lactation Text: This medication can cause feminization of the male fetus and should be avoided during pregnancy. The active metabolite is also found in breast milk. Rifampin Counseling: I discussed with the patient the risks of rifampin including but not limited to liver damage, kidney damage, red-orange body fluids, nausea/vomiting and severe allergy. Qbrexza Counseling:  I discussed with the patient the risks of Qbrexza including but not limited to headache, mydriasis, blurred vision, dry eyes, nasal dryness, dry mouth, dry throat, dry skin, urinary hesitation, and constipation.  Local skin reactions including erythema, burning, stinging, and itching can also occur. Methotrexate Counseling:  Patient counseled regarding adverse effects of methotrexate including but not limited to nausea, vomiting, abnormalities in liver function tests. Patients may develop mouth sores, rash, diarrhea, and abnormalities in blood counts. The patient understands that monitoring is required including LFT's and blood counts.  There is a rare possibility of scarring of the liver and lung problems that can occur when taking methotrexate. Persistent nausea, loss of appetite, pale stools, dark urine, cough, and shortness of breath should be reported immediately. Patient advised to discontinue methotrexate treatment at least three months before attempting to become pregnant.  I discussed the need for folate supplements while taking methotrexate.  These supplements can decrease side effects during methotrexate treatment. The patient verbalized understanding of the proper use and possible adverse effects of methotrexate.  All of the patient's questions and concerns were addressed. Griseofulvin Pregnancy And Lactation Text: This medication is Pregnancy Category X and is known to cause serious birth defects. It is unknown if this medication is excreted in breast milk but breast feeding should be avoided. Zyclara Counseling:  I discussed with the patient the risks of imiquimod including but not limited to erythema, scaling, itching, weeping, crusting, and pain.  Patient understands that the inflammatory response to imiquimod is variable from person to person and was educated regarded proper titration schedule.  If flu-like symptoms develop, patient knows to discontinue the medication and contact us. Thalidomide Counseling: I discussed with the patient the risks of thalidomide including but not limited to birth defects, anxiety, weakness, chest pain, dizziness, cough and severe allergy. Nemluvio Pregnancy And Lactation Text: It is not known if Nemluvio causes fetal harm or is present in breast milk. Please proceed with caution if patients who are pregnant or breastfeeding. Olumiant Pregnancy And Lactation Text: Based on animal studies, Olumiant may cause embryo-fetal harm when administered to pregnant women.  The medication should not be used in pregnancy.  Breastfeeding is not recommended during treatment. Doxepin Counseling:  Patient advised that the medication is sedating and not to drive a car after taking this medication. Patient informed of potential adverse effects including but not limited to dry mouth, urinary retention, and blurry vision.  The patient verbalized understanding of the proper use and possible adverse effects of doxepin.  All of the patient's questions and concerns were addressed. Xolair Counseling:  Patient informed of potential adverse effects including but not limited to fever, muscle aches, rash and allergic reactions.  The patient verbalized understanding of the proper use and possible adverse effects of Xolair.  All of the patient's questions and concerns were addressed. Topical Clindamycin Counseling: Patient counseled that this medication may cause skin irritation or allergic reactions.  In the event of skin irritation, the patient was advised to reduce the amount of the drug applied or use it less frequently.   The patient verbalized understanding of the proper use and possible adverse effects of clindamycin.  All of the patient's questions and concerns were addressed. Eucrisa Counseling: Patient may experience a mild burning sensation during topical application. Eucrisa is not approved in children less than 3 months of age. Low Dose Naltrexone Counseling- I discussed with the patient the potential risks and side effects of low dose naltrexone including but not limited to: more vivid dreams, headaches, nausea, vomiting, abdominal pain, fatigue, dizziness, and anxiety. Rituxan Counseling:  I discussed with the patient the risks of Rituxan infusions. Side effects can include infusion reactions, severe drug rashes including mucocutaneous reactions, reactivation of latent hepatitis and other infections and rarely progressive multifocal leukoencephalopathy.  All of the patient's questions and concerns were addressed. Rifampin Pregnancy And Lactation Text: This medication is Pregnancy Category C and it isn't know if it is safe during pregnancy. It is also excreted in breast milk and should not be used if you are breast feeding. Ebglyss Counseling: I discussed with the patient the risks of lebrikizumab including but not limited to eye inflammation and irritation, cold sores, injection site reactions, allergic reactions and increased risk of parasitic infection. The patient understands that monitoring is required and they must alert us or the primary physician if symptoms of infection or other concerning signs are noted. Methotrexate Pregnancy And Lactation Text: This medication is Pregnancy Category X and is known to cause fetal harm. This medication is excreted in breast milk. Itraconazole Counseling:  I discussed with the patient the risks of itraconazole including but not limited to liver damage, nausea/vomiting, neuropathy, and severe allergy.  The patient understands that this medication is best absorbed when taken with acidic beverages such as non-diet cola or ginger ale.  The patient understands that monitoring is required including baseline LFTs and repeat LFTs at intervals.  The patient understands that they are to contact us or the primary physician if concerning signs are noted. Qbrexza Pregnancy And Lactation Text: There is no available data on Qbrexza use in pregnant women.  There is no available data on Qbrexza use in lactation. Low Dose Naltrexone Pregnancy And Lactation Text: Naltrexone is pregnancy category C.  There have been no adequate and well-controlled studies in pregnant women.  It should be used in pregnancy only if the potential benefit justifies the potential risk to the fetus.   Limited data indicates that naltrexone is minimally excreted into breastmilk. Doxepin Pregnancy And Lactation Text: This medication is Pregnancy Category C and it isn't known if it is safe during pregnancy. It is also excreted in breast milk and breast feeding isn't recommended. Xolair Pregnancy And Lactation Text: This medication is Pregnancy Category B and is considered safe during pregnancy. This medication is excreted in breast milk. Rinvoq Counseling: I discussed with the patient the risks of Rinvoq therapy including but not limited to upper respiratory tract infections, shingles, cold sores, bronchitis, nausea, cough, fever, acne, and headache. Live vaccines should be avoided.  This medication has been linked to serious infections; higher rate of mortality; malignancy and lymphoproliferative disorders; major adverse cardiovascular events; thrombosis; thrombocytopenia, anemia, and neutropenia; lipid elevations; liver enzyme elevations; and gastrointestinal perforations. Calcipotriene Pregnancy And Lactation Text: The use of this medication during pregnancy or lactation is not recommended as there is insufficient data. Dapsone Counseling: I discussed with the patient the risks of dapsone including but not limited to hemolytic anemia, agranulocytosis, rashes, methemoglobinemia, kidney failure, peripheral neuropathy, headaches, GI upset, and liver toxicity.  Patients who start dapsone require monitoring including baseline LFTs and weekly CBCs for the first month, then every month thereafter.  The patient verbalized understanding of the proper use and possible adverse effects of dapsone.  All of the patient's questions and concerns were addressed. Spevigo Counseling: I discussed with the patient the risks of Spevigo including but not limited to fatigue, nasuea, vomiting, headache, pruritus, urinary tract infection, an infusion related reactions.  The patient understands that monitoring is required including screening for tuberculosis at baseline and yearly screening thereafter while continuing Spevigo therapy. They should contact us if symptoms of infection or other concerning signs are noted. Bimzelx Counseling:  I discussed with the patient the risks of Bimzelx including but not limited to depression, immunosuppression, allergic reactions and infections.  The patient understands that monitoring is required including a PPD at baseline and must alert us or the primary physician if symptoms of infection or other concerning signs are noted. Dutasteride Pregnancy And Lactation Text: This medication is absolutely contraindicated in women, especially during pregnancy and breast feeding. Feminization of male fetuses is possible if taking while pregnant. Erythromycin Pregnancy And Lactation Text: This medication is Pregnancy Category B and is considered safe during pregnancy. It is also excreted in breast milk. Acitretin Pregnancy And Lactation Text: This medication is Pregnancy Category X and should not be given to women who are pregnant or may become pregnant in the future. This medication is excreted in breast milk. Wartpeel Pregnancy And Lactation Text: This medication is Pregnancy Category X and contraindicated in pregnancy and in women who may become pregnant. It is unknown if this medication is excreted in breast milk. Azathioprine Pregnancy And Lactation Text: This medication is Pregnancy Category D and isn't considered safe during pregnancy. It is unknown if this medication is excreted in breast milk. Otezla Pregnancy And Lactation Text: This medication is Pregnancy Category C and it isn't known if it is safe during pregnancy. It is unknown if it is excreted in breast milk. Saxenda Counseling: I reviewed the possible side effects including: thyroid tumors, kidney disease, gallbladder disease, abdominal pain, constipation, diarrhea, nausea, vomiting and pancreatitis. Do not take this medication if you have a history or family history of multiple endocrine neoplasia syndrome type 2. Side effects reviewed, pt to contact office should one occur. Spevigo Pregnancy And Lactation Text: The risk during pregnancy and breastfeeding is uncertain with this medication. This medication does cross the placenta. It is unknown if this medication is found in breast milk. Opioid Counseling: I discussed with the patient the potential side effects of opioids including but not limited to addiction, altered mental status, and depression. I stressed avoiding alcohol, benzodiazepines, muscle relaxants and sleep aids unless specifically okayed by a physician. The patient verbalized understanding of the proper use and possible adverse effects of opioids. All of the patient's questions and concerns were addressed. They were instructed to flush the remaining pills down the toilet if they did not need them for pain. Dapsone Pregnancy And Lactation Text: This medication is Pregnancy Category C and is not considered safe during pregnancy or breast feeding. Erivedge Counseling- I discussed with the patient the risks of Erivedge including but not limited to nausea, vomiting, diarrhea, constipation, weight loss, changes in the sense of taste, decreased appetite, muscle spasms, and hair loss.  The patient verbalized understanding of the proper use and possible adverse effects of Erivedge.  All of the patient's questions and concerns were addressed. Cantharidin Counseling:  I discussed with the patient the risks of Cantharidin including but not limited to pain, redness, burning, itching, and blistering. Winlevi Counseling:  I discussed with the patient the risks of topical clascoterone including but not limited to erythema, scaling, itching, and stinging. Patient voiced their understanding. Oxybutynin Counseling:  I discussed with the patient the risks of oxybutynin including but not limited to skin rash, drowsiness, dry mouth, difficulty urinating, and blurred vision. Bexarotene Counseling:  I discussed with the patient the risks of bexarotene including but not limited to hair loss, dry lips/skin/eyes, liver abnormalities, hyperlipidemia, pancreatitis, depression/suicidal ideation, photosensitivity, drug rash/allergic reactions, hypothyroidism, anemia, leukopenia, infection, cataracts, and teratogenicity.  Patient understands that they will need regular blood tests to check lipid profile, liver function tests, white blood cell count, thyroid function tests and pregnancy test if applicable. Finasteride Male Counseling: Finasteride Counseling:  I discussed with the patient the risks of use of finasteride including but not limited to decreased libido, decreased ejaculate volume, gynecomastia, and depression. Women should not handle medication.  All of the patient's questions and concerns were addressed. Metronidazole Counseling:  I discussed with the patient the risks of metronidazole including but not limited to seizures, nausea/vomiting, a metallic taste in the mouth, nausea/vomiting and severe allergy. Bimzelx Pregnancy And Lactation Text: This medication crosses the placenta and the safety is uncertain during pregnancy. It is unknown if this medication is present in breast milk. Opioid Pregnancy And Lactation Text: These medications can lead to premature delivery and should be avoided during pregnancy. These medications are also present in breast milk in small amounts. Cellcept Counseling:  I discussed with the patient the risks of mycophenolate mofetil including but not limited to infection/immunosuppression, GI upset, hypokalemia, hypercholesterolemia, bone marrow suppression, lymphoproliferative disorders, malignancy, GI ulceration/bleed/perforation, colitis, interstitial lung disease, kidney failure, progressive multifocal leukoencephalopathy, and birth defects.  The patient understands that monitoring is required including a baseline creatinine and regular CBC testing. In addition, patient must alert us immediately if symptoms of infection or other concerning signs are noted. Opzelura Counseling:  I discussed with the patient the risks of Opzelura including but not limited to nasopharngitis, bronchitis, ear infection, eosinophila, hives, diarrhea, folliculitis, tonsillitis, and rhinorrhea.  Taken orally, this medication has been linked to serious infections; higher rate of mortality; malignancy and lymphoproliferative disorders; major adverse cardiovascular events; thrombosis; thrombocytopenia, anemia, and neutropenia; and lipid elevations. Gabapentin Counseling: I discussed with the patient the risks of gabapentin including but not limited to dizziness, somnolence, fatigue and ataxia. Stelara Counseling:  I discussed with the patient the risks of ustekinumab including but not limited to immunosuppression, malignancy, posterior leukoencephalopathy syndrome, and serious infections.  The patient understands that monitoring is required including a PPD at baseline and must alert us or the primary physician if symptoms of infection or other concerning signs are noted. 5-Fu Counseling: 5-Fluorouracil Counseling:  I discussed with the patient the risks of 5-fluorouracil including but not limited to erythema, scaling, itching, weeping, crusting, and pain. Cimzia Counseling:  I discussed with the patient the risks of Cimzia including but not limited to immunosuppression, allergic reactions and infections.  The patient understands that monitoring is required including a PPD at baseline and must alert us or the primary physician if symptoms of infection or other concerning signs are noted. Winlevi Pregnancy And Lactation Text: This medication is considered safe during pregnancy and breastfeeding. Metronidazole Pregnancy And Lactation Text: This medication is Pregnancy Category B and considered safe during pregnancy.  It is also excreted in breast milk. Bexarotene Pregnancy And Lactation Text: This medication is Pregnancy Category X and should not be given to women who are pregnant or may become pregnant. This medication should not be used if you are breast feeding. Opzelura Pregnancy And Lactation Text: There is insufficient data to evaluate drug-associated risk for major birth defects, miscarriage, or other adverse maternal or fetal outcomes.  There is a pregnancy registry that monitors pregnancy outcomes in pregnant persons exposed to the medication during pregnancy.  It is unknown if this medication is excreted in breast milk.  Do not breastfeed during treatment and for about 4 weeks after the last dose. Finasteride Female Counseling: Finasteride Counseling:  I discussed with the patient the risks of use of finasteride including but not limited to decreased libido and sexual dysfunction. Explained the teratogenic nature of the medication and stressed the importance of not getting pregnant during treatment. All of the patient's questions and concerns were addressed. Semaglutide Counseling: I reviewed the possible side effects including: thyroid tumors, kidney disease, gallbladder disease, abdominal pain, constipation, diarrhea, nausea, vomiting and pancreatitis. Do not take this medication if you have a history or family history of multiple endocrine neoplasia syndrome type 2. Side effects reviewed, pt to contact office should one occur. Gabapentin Pregnancy And Lactation Text: This medication is Pregnancy Category C and isn't considered safe during pregnancy. It is excreted in breast milk. Cibinqo Counseling: I discussed with the patient the risks of Cibinqo therapy including but not limited to common cold, nausea, headache, cold sores, increased blood CPK levels, dizziness, UTIs, fatigue, acne, and vomitting. Live vaccines should be avoided.  This medication has been linked to serious infections; higher rate of mortality; malignancy and lymphoproliferative disorders; major adverse cardiovascular events; thrombosis; thrombocytopenia and lymphopenia; lipid elevations; and retinal detachment. Minocycline Counseling: Patient advised regarding possible photosensitivity and discoloration of the teeth, skin, lips, tongue and gums.  Patient instructed to avoid sunlight, if possible.  When exposed to sunlight, patients should wear protective clothing, sunglasses, and sunscreen.  The patient was instructed to call the office immediately if the following severe adverse effects occur:  hearing changes, easy bruising/bleeding, severe headache, or vision changes.  The patient verbalized understanding of the proper use and possible adverse effects of minocycline.  All of the patient's questions and concerns were addressed. Albendazole Counseling:  I discussed with the patient the risks of albendazole including but not limited to cytopenia, kidney damage, nausea/vomiting and severe allergy.  The patient understands that this medication is being used in an off-label manner. Isotretinoin Counseling: Patient should get monthly blood tests, not donate blood, not drive at night if vision affected, not share medication, and not undergo elective surgery for 6 months after tx completed. Side effects reviewed, pt to contact office should one occur. Libtayo Counseling- I discussed with the patient the risks of Libtayo including but not limited to nausea, vomiting, diarrhea, and bone or muscle pain.  The patient verbalized understanding of the proper use and possible adverse effects of Libtayo.  All of the patient's questions and concerns were addressed. Finasteride Pregnancy And Lactation Text: This medication is absolutely contraindicated during pregnancy. It is unknown if it is excreted in breast milk. Picato Counseling:  I discussed with the patient the risks of Picato including but not limited to erythema, scaling, itching, weeping, crusting, and pain. Cimzia Pregnancy And Lactation Text: This medication crosses the placenta but can be considered safe in certain situations. Cimzia may be excreted in breast milk. Cyclophosphamide Counseling:  I discussed with the patient the risks of cyclophosphamide including but not limited to hair loss, hormonal abnormalities, decreased fertility, abdominal pain, diarrhea, nausea and vomiting, bone marrow suppression and infection. The patient understands that monitoring is required while taking this medication. VTAMA Counseling: I discussed with the patient that VTAMA is not for use in the eyes, mouth or mouth. They should call the office if they develop any signs of allergic reactions to VTAMA. The patient verbalized understanding of the proper use and possible adverse effects of VTAMA.  All of the patient's questions and concerns were addressed. Propranolol Counseling:  I discussed with the patient the risks of propranolol including but not limited to low heart rate, low blood pressure, low blood sugar, restlessness and increased cold sensitivity. They should call the office if they experience any of these side effects. Cibinqo Pregnancy And Lactation Text: It is unknown if this medication will adversely affect pregnancy or breast feeding.  You should not take this medication if you are currently pregnant or planning a pregnancy or while breastfeeding. Taltz Counseling: I discussed with the patient the risks of ixekizumab including but not limited to immunosuppression, serious infections, worsening of inflammatory bowel disease and drug reactions.  The patient understands that monitoring is required including a PPD at baseline and must alert us or the primary physician if symptoms of infection or other concerning signs are noted. Libtayo Pregnancy And Lactation Text: This medication is contraindicated in pregnancy and when breast feeding. Topical Retinoid counseling:  Patient advised to apply a pea-sized amount only at bedtime and wait 30 minutes after washing their face before applying.  If too drying, patient may add a non-comedogenic moisturizer. The patient verbalized understanding of the proper use and possible adverse effects of retinoids.  All of the patient's questions and concerns were addressed. Drysol Counseling:  I discussed with the patient the risks of drysol/aluminum chloride including but not limited to skin rash, itching, irritation, burning. Glycopyrrolate Counseling:  I discussed with the patient the risks of glycopyrrolate including but not limited to skin rash, drowsiness, dry mouth, difficulty urinating, and blurred vision. Cosentyx Counseling:  I discussed with the patient the risks of Cosentyx including but not limited to worsening of Crohn's disease, immunosuppression, allergic reactions and infections.  The patient understands that monitoring is required including a PPD at baseline and must alert us or the primary physician if symptoms of infection or other concerning signs are noted. Birth Control Pills Counseling: Birth Control Pill Counseling: I discussed with the patient the potential side effects of OCPs including but not limited to increased risk of stroke, heart attack, thrombophlebitis, deep venous thrombosis, hepatic adenomas, breast changes, GI upset, headaches, and depression.  The patient verbalized understanding of the proper use and possible adverse effects of OCPs. All of the patient's questions and concerns were addressed. Isotretinoin Pregnancy And Lactation Text: This medication is Pregnancy Category X and is considered extremely dangerous during pregnancy. It is unknown if it is excreted in breast milk. Klisyri Counseling:  I discussed with the patient the risks of Klisyri including but not limited to erythema, scaling, itching, weeping, crusting, and pain. Olanzapine Counseling- I discussed with the patient the common side effects of olanzapine including but are not limited to: lack of energy, dry mouth, increased appetite, sleepiness, tremor, constipation, dizziness, changes in behavior, or restlessness.  Explained that teenagers are more likely to experience headaches, abdominal pain, pain in the arms or legs, tiredness, and sleepiness.  Serious side effects include but are not limited: increased risk of death in elderly patients who are confused, have memory loss, or dementia-related psychosis; hyperglycemia; increased cholesterol and triglycerides; and weight gain. Topical Steroids Counseling: I discussed with the patient that prolonged use of topical steroids can result in the increased appearance of superficial blood vessels (telangiectasias), lightening (hypopigmentation) and thinning of the skin (atrophy).  Patient understands to avoid using high potency steroids in skin folds, the groin or the face.  The patient verbalized understanding of the proper use and possible adverse effects of topical steroids.  All of the patient's questions and concerns were addressed. Clindamycin Counseling: I counseled the patient regarding use of clindamycin as an antibiotic for prophylactic and/or therapeutic purposes. Clindamycin is active against numerous classes of bacteria, including skin bacteria. Side effects may include nausea, diarrhea, gastrointestinal upset, rash, hives, yeast infections, and in rare cases, colitis. Ilumya Counseling: I discussed with the patient the risks of tildrakizumab including but not limited to immunosuppression, malignancy, posterior leukoencephalopathy syndrome, and serious infections.  The patient understands that monitoring is required including a PPD at baseline and must alert us or the primary physician if symptoms of infection or other concerning signs are noted. Soolantra Pregnancy And Lactation Text: This medication is Pregnancy Category C. This medication is considered safe during breast feeding. Simponi Counseling:  I discussed with the patient the risks of golimumab including but not limited to myelosuppression, immunosuppression, autoimmune hepatitis, demyelinating diseases, lymphoma, and serious infections.  The patient understands that monitoring is required including a PPD at baseline and must alert us or the primary physician if symptoms of infection or other concerning signs are noted. Clofazimine Counseling:  I discussed with the patient the risks of clofazimine including but not limited to skin and eye pigmentation, liver damage, nausea/vomiting, gastrointestinal bleeding and allergy. Benzoyl Peroxide Pregnancy And Lactation Text: This medication is Pregnancy Category C. It is unknown if benzoyl peroxide is excreted in breast milk. Azithromycin Counseling:  I discussed with the patient the risks of azithromycin including but not limited to GI upset, allergic reaction, drug rash, diarrhea, and yeast infections. Clindamycin Pregnancy And Lactation Text: This medication can be used in pregnancy if certain situations. Clindamycin is also present in breast milk. Klisyri Pregnancy And Lactation Text: It is unknown if this medication can harm a developing fetus or if it is excreted in breast milk. Topical Steroids Applications Pregnancy And Lactation Text: Most topical steroids are considered safe to use during pregnancy and lactation.  Any topical steroid applied to the breast or nipple should be washed off before breastfeeding. Olanzapine Pregnancy And Lactation Text: This medication is pregnancy category C.   There are no adequate and well controlled trials with olanzapine in pregnant females.  Olanzapine should be used during pregnancy only if the potential benefit justifies the potential risk to the fetus.   In a study in lactating healthy women, olanzapine was excreted in breast milk.  It is recommended that women taking olanzapine should not breast feed. Azithromycin Pregnancy And Lactation Text: This medication is considered safe during pregnancy and is also secreted in breast milk. Carac Counseling:  I discussed with the patient the risks of Carac including but not limited to erythema, scaling, itching, weeping, crusting, and pain. Topical Sulfur Applications Counseling: Topical Sulfur Counseling: Patient counseled that this medication may cause skin irritation or allergic reactions.  In the event of skin irritation, the patient was advised to reduce the amount of the drug applied or use it less frequently.   The patient verbalized understanding of the proper use and possible adverse effects of topical sulfur application.  All of the patient's questions and concerns were addressed. Infliximab Counseling:  I discussed with the patient the risks of infliximab including but not limited to myelosuppression, immunosuppression, autoimmune hepatitis, demyelinating diseases, lymphoma, and serious infections.  The patient understands that monitoring is required including a PPD at baseline and must alert us or the primary physician if symptoms of infection or other concerning signs are noted. Doxycycline Counseling:  Patient counseled regarding possible photosensitivity and increased risk for sunburn.  Patient instructed to avoid sunlight, if possible.  When exposed to sunlight, patients should wear protective clothing, sunglasses, and sunscreen.  The patient was instructed to call the office immediately if the following severe adverse effects occur:  hearing changes, easy bruising/bleeding, severe headache, or vision changes.  The patient verbalized understanding of the proper use and possible adverse effects of doxycycline.  All of the patient's questions and concerns were addressed. Oral Minoxidil Counseling- I discussed with the patient the risks of oral minoxidil including but not limited to shortness of breath, swelling of the feet or ankles, dizziness, lightheadedness, unwanted hair growth and allergic reaction.  The patient verbalized understanding of the proper use and possible adverse effects of oral minoxidil.  All of the patient's questions and concerns were addressed. Minoxidil Counseling: Minoxidil is a topical medication which can increase blood flow where it is applied. It is uncertain how this medication increases hair growth. Side effects are uncommon and include stinging and allergic reactions. Colchicine Counseling:  Patient counseled regarding adverse effects including but not limited to stomach upset (nausea, vomiting, stomach pain, or diarrhea).  Patient instructed to limit alcohol consumption while taking this medication.  Colchicine may reduce blood counts especially with prolonged use.  The patient understands that monitoring of kidney function and blood counts may be required, especially at baseline. The patient verbalized understanding of the proper use and possible adverse effects of colchicine.  All of the patient's questions and concerns were addressed. Skyrizi Counseling: I discussed with the patient the risks of risankizumab-rzaa including but not limited to immunosuppression, and serious infections.  The patient understands that monitoring is required including a PPD at baseline and must alert us or the primary physician if symptoms of infection or other concerning signs are noted. Oral Minoxidil Pregnancy And Lactation Text: This medication should only be used when clearly needed if you are pregnant, attempting to become pregnant or breast feeding. Bactrim Counseling:  I discussed with the patient the risks of sulfa antibiotics including but not limited to GI upset, allergic reaction, drug rash, diarrhea, dizziness, photosensitivity, and yeast infections.  Rarely, more serious reactions can occur including but not limited to aplastic anemia, agranulocytosis, methemoglobinemia, blood dyscrasias, liver or kidney failure, lung infiltrates or desquamative/blistering drug rashes. Doxycycline Pregnancy And Lactation Text: This medication is Pregnancy Category D and not consider safe during pregnancy. It is also excreted in breast milk but is considered safe for shorter treatment courses. Dutasteride Male Counseling: Dustasteride Counseling:  I discussed with the patient the risks of use of dutasteride including but not limited to decreased libido, decreased ejaculate volume, and gynecomastia. Women who can become pregnant should not handle medication.  All of the patient's questions and concerns were addressed. Ozempic Counseling: I reviewed the possible side effects including: thyroid tumors, kidney disease, gallbladder disease, abdominal pain, constipation, diarrhea, nausea, vomiting and pancreatitis. Do not take this medication if you have a history or family history of multiple endocrine neoplasia syndrome type 2. Side effects reviewed, pt to contact office should one occur. Adbry Counseling: I discussed with the patient the risks of tralokinumab including but not limited to eye infection and irritation, cold sores, injection site reactions, worsening of asthma, allergic reactions and increased risk of parasitic infection.  Live vaccines should be avoided while taking tralokinumab. The patient understands that monitoring is required and they must alert us or the primary physician if symptoms of infection or other concerning signs are noted. Topical Sulfur Applications Pregnancy And Lactation Text: This medication is considered safe during pregnancy and breast feeding secondary to limited systemic absorption. Acitretin Counseling:  I discussed with the patient the risks of acitretin including but not limited to hair loss, dry lips/skin/eyes, liver damage, hyperlipidemia, depression/suicidal ideation, photosensitivity.  Serious rare side effects can include but are not limited to pancreatitis, pseudotumor cerebri, bony changes, clot formation/stroke/heart attack.  Patient understands that alcohol is contraindicated since it can result in liver toxicity and significantly prolong the elimination of the drug by many years. Calcipotriene Counseling:  I discussed with the patient the risks of calcipotriene including but not limited to erythema, scaling, itching, and irritation. Mirvaso Counseling: Mirvaso is a topical medication which can decrease superficial blood flow where applied. Side effects are uncommon and include stinging, redness and allergic reactions. Adbry Pregnancy And Lactation Text: It is unknown if this medication will adversely affect pregnancy or breast feeding. Otezla Counseling: The side effects of Otezla were discussed with the patient, including but not limited to worsening or new depression, weight loss, diarrhea, nausea, upper respiratory tract infection, and headache. Patient instructed to call the office should any adverse effect occur.  The patient verbalized understanding of the proper use and possible adverse effects of Otezla.  All the patient's questions and concerns were addressed. Wartpeel Counseling:  I discussed with the patient the risks of Wartpeel including but not limited to erythema, scaling, itching, weeping, crusting, and pain. Erythromycin Counseling:  I discussed with the patient the risks of erythromycin including but not limited to GI upset, allergic reaction, drug rash, diarrhea, increase in liver enzymes, and yeast infections. Dutasteride Female Counseling: Dutasteride Counseling:  I discussed with the patient the risks of use of dutasteride including but not limited to decreased libido and sexual dysfunction. Explained the teratogenic nature of the medication and stressed the importance of not getting pregnant during treatment. All of the patient's questions and concerns were addressed. Azathioprine Counseling:  I discussed with the patient the risks of azathioprine including but not limited to myelosuppression, immunosuppression, hepatotoxicity, lymphoma, and infections.  The patient understands that monitoring is required including baseline LFTs, Creatinine, possible TPMP genotyping and weekly CBCs for the first month and then every 2 weeks thereafter.  The patient verbalized understanding of the proper use and possible adverse effects of azathioprine.  All of the patient's questions and concerns were addressed.